# Patient Record
Sex: FEMALE | URBAN - METROPOLITAN AREA
[De-identification: names, ages, dates, MRNs, and addresses within clinical notes are randomized per-mention and may not be internally consistent; named-entity substitution may affect disease eponyms.]

---

## 2018-12-18 ENCOUNTER — APPOINTMENT (RX ONLY)
Dept: URBAN - METROPOLITAN AREA CLINIC 98 | Facility: CLINIC | Age: 33
Setting detail: DERMATOLOGY
End: 2018-12-18

## 2018-12-18 DIAGNOSIS — L91.0 HYPERTROPHIC SCAR: ICD-10-CM

## 2018-12-18 PROCEDURE — ? TREATMENT REGIMEN

## 2018-12-18 PROCEDURE — 11900 INJECT SKIN LESIONS </W 7: CPT

## 2018-12-18 PROCEDURE — ? INTRALESIONAL KENALOG

## 2018-12-18 ASSESSMENT — LOCATION ZONE DERM: LOCATION ZONE: TRUNK

## 2018-12-18 ASSESSMENT — LOCATION SIMPLE DESCRIPTION DERM
LOCATION SIMPLE: ABDOMEN
LOCATION SIMPLE: CHEST

## 2018-12-18 ASSESSMENT — SCAR ASSESSEMENT OVERALL: SCAR ASSESSMENT: 2 (RAISED UNIFORM SCAR, NO ERYTHEMA)

## 2018-12-18 ASSESSMENT — LOCATION DETAILED DESCRIPTION DERM
LOCATION DETAILED: MIDDLE STERNUM
LOCATION DETAILED: PERIUMBILICAL SKIN

## 2018-12-18 NOTE — PROCEDURE: INTRALESIONAL KENALOG
Consent: The risks of atrophy were reviewed with the patient.
Concentration Of Solution Injected (Mg/Ml): 20.0
Detail Level: Detailed
Administered By (Optional): Dr. Lopez
Include Z78.9 (Other Specified Conditions Influencing Health Status) As An Associated Diagnosis?: No
X Size Of Lesion In Cm (Optional): 0
Total Volume Injected (Ccs- Only Use Numbers And Decimals): 0.5
Medical Necessity Clause: This procedure was medically necessary because the lesions that were treated were:
Kenalog Preparation: Kenalog

## 2018-12-18 NOTE — HPI: SKIN LESION
Is This A New Presentation, Or A Follow-Up?: Skin Lesion
What Type Of Note Output Would You Prefer (Optional)?: Bullet Format
How Severe Is Your Skin Lesion?: moderate
Has Your Skin Lesion Been Treated?: been treated
Additional History: Pt got injection every 2 months x 1 year of k 40 which flattened the keloid . Pt reports her last injection was 6 mos ago .
Is This A New Presentation, Or A Follow-Up?: Growth
Has Your Skin Lesion Been Treated?: not been treated
Additional History: Pt c/o keloid .

## 2019-07-18 ENCOUNTER — LAB VISIT (OUTPATIENT)
Dept: LAB | Facility: OTHER | Age: 34
End: 2019-07-18
Attending: OBSTETRICS & GYNECOLOGY
Payer: MEDICAID

## 2019-07-18 ENCOUNTER — OFFICE VISIT (OUTPATIENT)
Dept: OBSTETRICS AND GYNECOLOGY | Facility: CLINIC | Age: 34
End: 2019-07-18
Payer: MEDICAID

## 2019-07-18 VITALS
SYSTOLIC BLOOD PRESSURE: 94 MMHG | BODY MASS INDEX: 26.94 KG/M2 | WEIGHT: 146.38 LBS | HEIGHT: 62 IN | DIASTOLIC BLOOD PRESSURE: 60 MMHG

## 2019-07-18 DIAGNOSIS — R10.2 PELVIC PAIN: Primary | ICD-10-CM

## 2019-07-18 DIAGNOSIS — N94.10 DYSPAREUNIA IN FEMALE: ICD-10-CM

## 2019-07-18 DIAGNOSIS — Z11.3 ROUTINE SCREENING FOR STI (SEXUALLY TRANSMITTED INFECTION): ICD-10-CM

## 2019-07-18 PROCEDURE — 99385 PREV VISIT NEW AGE 18-39: CPT | Mod: 25,S$PBB,, | Performed by: OBSTETRICS & GYNECOLOGY

## 2019-07-18 PROCEDURE — 87801 DETECT AGNT MULT DNA AMPLI: CPT

## 2019-07-18 PROCEDURE — 87491 CHLMYD TRACH DNA AMP PROBE: CPT | Mod: 59

## 2019-07-18 PROCEDURE — 99203 OFFICE O/P NEW LOW 30 MIN: CPT | Mod: PBBFAC | Performed by: OBSTETRICS & GYNECOLOGY

## 2019-07-18 PROCEDURE — 87340 HEPATITIS B SURFACE AG IA: CPT

## 2019-07-18 PROCEDURE — 36415 COLL VENOUS BLD VENIPUNCTURE: CPT

## 2019-07-18 PROCEDURE — 99999 PR PBB SHADOW E&M-NEW PATIENT-LVL III: ICD-10-PCS | Mod: PBBFAC,,, | Performed by: OBSTETRICS & GYNECOLOGY

## 2019-07-18 PROCEDURE — 86803 HEPATITIS C AB TEST: CPT

## 2019-07-18 PROCEDURE — 99385 PR PREVENTIVE VISIT,NEW,18-39: ICD-10-PCS | Mod: 25,S$PBB,, | Performed by: OBSTETRICS & GYNECOLOGY

## 2019-07-18 PROCEDURE — 86592 SYPHILIS TEST NON-TREP QUAL: CPT

## 2019-07-18 PROCEDURE — 99999 PR PBB SHADOW E&M-NEW PATIENT-LVL III: CPT | Mod: PBBFAC,,, | Performed by: OBSTETRICS & GYNECOLOGY

## 2019-07-18 PROCEDURE — 87481 CANDIDA DNA AMP PROBE: CPT | Mod: 59

## 2019-07-18 PROCEDURE — 86703 HIV-1/HIV-2 1 RESULT ANTBDY: CPT

## 2019-07-18 NOTE — PROGRESS NOTES
"CC: 32 yo  female, here for AE and problem visit    HPI: Carmina is overall well today.  She is a  s/p .  Moved from [place]. Last pap was 1.5 years ago. Painful intercourse for last 2 months. Partner may have some infidelity. No vaginal discharge. Regular cycles, painful for first 2 days. Tried birth control before and no improvement in her cycles. Also has some heavy bleeding. Partner has had a vasectomy. She has a 7 yo child. Pain with insertion with intercourse. No current stressors other than being a mom. She works out 2 hours per day. Not working currently. No history of sexual trauma.      History reviewed. No pertinent past medical history.    History reviewed. No pertinent surgical history.    OB History        1    Para   1    Term   1            AB        Living   1       SAB        TAB        Ectopic        Multiple        Live Births   1                 No current outpatient medications on file prior to visit.     No current facility-administered medications on file prior to visit.        ROS:  GENERAL: Denies weight gain or weight loss. Feeling well overall.   SKIN: Denies rash or lesions.   HEAD: Denies head injury or headache.   CHEST: Denies chest pain or shortness of breath.   CARDIOVASCULAR: Denies palpitations or left sided chest pain.   ABDOMEN: No abdominal pain, constipation, diarrhea, nausea, vomiting or rectal bleeding.   URINARY: No frequency, dysuria, hematuria or burning on urination.  REPRODUCTIVE: See HPI.   HEMATOLOGIC: No easy bruisability or excessive bleeding.   MUSCULOSKELETAL: Denies joint pain or swelling.     Physical Exam:   BP 94/60   Ht 5' 2" (1.575 m)   Wt 66.4 kg (146 lb 6.2 oz)   LMP 2019 (Approximate)   BMI 26.77 kg/m²   General: No distress, well appearing  HEENT: normocephalic, atraumatic   Heart: Regular rate  Lungs: No increased work of breathing  Breasts: Symmetrical, no masses, discharge or skin retractions. There is keloid scar " on upper portion of chest (from prior removal of pimple). Fibrocystic changes present.   Abdomen: soft, nontender, no masses  MS: lower extremeties symmetrical, no edema  Pelvic Exam:     GENITALIA: Normal external genitalia, no lesions   URETHRA: normal appearing   Bladder non tender   VAGINA: normal vaginal mucosa, no lesions, she has significant pelvic floor tenderness on exam along posterior vaginal wall consistent with myofascial pelvic pain   CERVIX: no lesions visible, no CMT    UTERUS: small, mobile, non tender   ADNEXA: no adnexal masses, tenderness or fullness  PSYCH: Normal affect, mood appropriate     ASSESSMENT/PLAN: 34 yo  here for AE and problem visit.     1. Pap up to date, next due in   2. Vasectomy for contraception  3. Desires STI screening    Dyspareunia - suspect pelvic floor myalgia based on exam findings  1. GC/CT and affirm collected and sent  2. Referral to pelvic floor PT    Morena Monk MD  Obstetrics and Gynecology  [facility]

## 2019-07-19 LAB
BACTERIAL VAGINOSIS DNA: NEGATIVE
C TRACH DNA SPEC QL NAA+PROBE: NOT DETECTED
CANDIDA GLABRATA DNA: NEGATIVE
CANDIDA KRUSEI DNA: NEGATIVE
CANDIDA RRNA VAG QL PROBE: NEGATIVE
HBV SURFACE AG SERPL QL IA: NEGATIVE
HCV AB SERPL QL IA: NEGATIVE
HIV 1+2 AB+HIV1 P24 AG SERPL QL IA: NEGATIVE
N GONORRHOEA DNA SPEC QL NAA+PROBE: NOT DETECTED
RPR SER QL: NORMAL
T VAGINALIS RRNA GENITAL QL PROBE: NEGATIVE

## 2019-09-04 ENCOUNTER — CLINICAL SUPPORT (OUTPATIENT)
Dept: REHABILITATION | Facility: HOSPITAL | Age: 34
End: 2019-09-04
Attending: OBSTETRICS & GYNECOLOGY
Payer: MEDICAID

## 2019-09-04 DIAGNOSIS — M62.838 MUSCLE SPASM: ICD-10-CM

## 2019-09-04 PROCEDURE — 97161 PT EVAL LOW COMPLEX 20 MIN: CPT | Mod: PO

## 2019-09-04 PROCEDURE — 97110 THERAPEUTIC EXERCISES: CPT | Mod: PO

## 2019-09-04 NOTE — PATIENT INSTRUCTIONS
Home Exercise Program: 09/04/2019    PERINEAL MASSAGE    1. Wash hands thoroughly with antibacterial soap.  2. Lay down comfortably with your back and head well supported by several pillows.  3. Apply water-based lubricant (ie. Slippery stuff, coconut oil, olive oil) on your thumb and perineum.  4. Place one thumb to 1st knuckle just inside the vagina.  5. Gently press downward at 6 o' clock. Hold for 90 seconds. Perform Diaphragmatic breathing while holding the stretch. Repeat at 5 and 7 o' clock.  6. The amount of pressure for the stretch may cause discomfort, but not pain (ie. You can replicate the stretching sensation by bending your finger backward). Don't go above 4-5/10 pain during or after the exericse.  7. Focus on relaxed breathing and keeping the pelvic floor muscles dropped.   8. Continue for 5-10 minutes, 3-4 times per week.    STOP if there is increased bleeding, an infection, or extreme pain.       Some may prefer their partner to assist them or to perform the massage for them. Your partner needs to use clean hands and gently insert one or two index fingers inside the lower part of the vagina and follow the steps listed above. It is important to tell your partner how much pressure to apply without causing pain.       Home Exercise Program: 09/04/2019    DIAPHRAGMATIC BREATHING     The diaphragm is a dome shaped muscle that forms the floor of the rib cage. It is the most efficient muscle for breathing and relaxation, although most people are not used to using the diaphragm. Diaphragmatic or belly breathing is an important technique to learn because it helps settle down or relax the autonomic nervous system. The correct use of diaphragmatic breathing can help to quiet brain activity resulting in the relaxation of all the muscles and organs of the body. This is accomplished by slow rhythmic breathing concentrated in the diaphragm muscle rather than the chest.    How to do proper relaxation  breathing:     Start by lying on your back or reclining in a chair in a relaxed position. Place one hand on your chest and the other on your abdomen.   Relax your jaw by placing your tongue on the roof of your mouth and keeping your teeth slightly apart.    Take a deep breath in, letting the abdomen expand and rise while you keep your upper chest, neck and shoulders relaxed.    As you breathe out, allow your abdomen and chest to fall. Exhale completely.   It doesn't matter if you breathe in/out through your nose and/or mouth. Do whichever feels comfortable.   Remember to breathe slowly.  Do not force your breathing. Do not hold your breath.   Repeat for 10 minutes every day.

## 2019-09-04 NOTE — PLAN OF CARE
[facility]  Physical Therapy Initial Evaluation    Name: Carmina Rhodes  Clinic Number: [id number]    Therapy Diagnosis:   Encounter Diagnosis   Name Primary?    Muscle spasm      Physician: Morena Monk MD      Physician Orders: PT Eval and Treat   Medical Diagnosis: dyspareunia  Evaluation Date: 9/4/2019  Authorization Period Expiration: 12/31/19  Plan of Care Certification Period: 12/4/19  Visit #/Visits authorized: 1/ 20     Today's Date: 9/4/2019    Time In: 1200 PM  Time Out: 100 PM  Total Billable Time: 60 minutes    Precautions: Standard    SUBJECTIVE     Date of onset: 3 months ago    History of current condition - Carmina reports: she randomly began having pain with intercourse out of the blue. She used to do doggie style but reports she has been unable to try. No position is comfortable. She now has to use water based lubricant at every encounter. She has pain at initial insertion and with thrusting. No other pain reported- only with intercourse. She is not on birth control. She does state that she was lifting heavy weights with a  earlier in the year and is unsure if this may have caused. She does report some increase in stress with her daughter. Last attempt at intercourse was Sunday. It was painful the whole time but able to perform. No pain with tampons. She did have pain with vaginal exam by MD.      History  No past medical history on file.    Carmina Rhodes  has no past surgical history on file.    Carmina currently has no medications in their medication list.    Review of patient's allergies indicates:   Allergen Reactions    Sulfa (sulfonamide antibiotics)           OB/GYN History: childbirth vaginal delivery and episiotomy    Bladder/Bowel History: constipation/straining for movement    Abuse History: denies    Reproductive Symptoms  Sexually active: Yes  Pain: No    Urinary Symptoms  Frequency of urination    Daytime: 4            Nighttime:  0  Difficulty initiating urine stream: Yes  Urine stream: strong  Complete emptying: Yes  Bladder leakage: No  Frequency of incidents: None  Amount leaked: None    Bowel Symptoms  Frequency of bowel movements: once every 2-3 days  Difficulty initiating BM: Yes  Quality/Shape of BM: [place] Stool Chart type(s) 2, 3, 4  Complete emptying: Yes  Colon leakage: No    Diet / Behavior  Form of protection: none   Number of pads required in 24 hours: 0  Fluid intake: water  Diet:Regular  Current exercise:Cardio (2 hours, 5 days per week)    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    PLOF: no pain with intercourse    Pain:  Current 0/10, worst 8/10, best 0/10 (with 0 being the lowest and 10 being the highest)  Location: vagina  Pelvic Pain Duration Occurs only during provocation  Pain description:Sharp  Aggravating Factors: interourse    Pts goals: pain-free intercourse      OBJECTIVE     Chaperone: denied  Consent signed: Yes    ORTHO SCREEN  Posture: WNL  Pelvic alignment: no sign of deviations noted in supine  Pubic symphysis: WNL    ABDOMINALS  Scarring: keloid from belly piercing  Diastasis: 1 fingers above umbilicus, 1 fingers at umbilicus, 1 fingers below umbilicus   Abdominal strength: Rectus: 4/5     TA: strong      VAGINAL PELVIC FLOOR EXAM    EXTERNAL ASSESSMENT  Introitus: WNL  Skin condition: WNL  Scarring: none   Sensation: WNL   Pain: none  Pelvic Clock Assessment: + TTP bulbocavernosus and STP B  Voluntary contraction: present  Voluntary relaxation: present but partial  Involuntary contraction: present  Bearing down: present  Perineal descent: absent      INTERNAL ASSESSMENT  Pain: trigger points as follows: B OIs and pain once again at B STP with introital stretch   Sensation: able to localized pressure appropriately   Vaginal vault: WNL   Muscle Bulk: hypertonic  Muscle Power: 4/5  Muscle Endurance: NT  # Reps To Fatigue: NT    Fast Contractions: NT   Involuntary Contraction: contraction  Bearing Down: bulge      Quality of contraction: WNL   Specificity: WNL   Coordination: WNL   Prolapse check: DNA    Not performed         TREATMENT     Treatment Time In: 1245 PM  Treatment Time Out: 100 PM  Total Treatment time separate from Evaluation: 15 minutes    Carmina participated in therapeutic exercises to develop ROM for 15 minutes including:  Instruction in introital stretching  Instruction of performing Diaphrgmatic breathing      EDUCATION  Education provided:   - Instructed on general anatomy/physiology  - Role of therapy in multi-disciplinary team  - Instructed in purpose of physical therapy and the benefits/risks of treatment  - Risks of refusing treatment  - POC and goals for therapy  No spiritual or educational barriers to learning provided    Written Home Exercises Provided:   Pt was provided with a written copy of exercises to perform at home. Carmina demonstrated good  understanding of the education provided.     See EMR under Patient Instructions for exercises provided 9/4/2019.    ASSESSMENT      Carmina is a 33 y.o. female referred to outpatient Physical Therapy with a medical diagnosis of dysparuenia. Pt presents with increased tension of the pelvic floor with trigger points and pain noted in B OIs and superficial transverse perineals. This is leading to pain with intercourse and pain with vaginal exams.    Pt prognosis is Good.   Pt will benefit from skilled outpatient Physical Therapy to address the deficits stated above and in the chart below, provide pt/family education, and to maximize pt's level of independence.     Plan of care discussed with patient: Yes  Pt's spiritual, cultural and educational needs considered and patient is agreeable to the plan of care and goals as stated below:     Anticipated Barriers for therapy: none    Medical Necessity is demonstrated by the following  History  Co-morbidities and personal factors that may impact the plan of care Co-morbidities:   none    Personal Factors:    none     low   Examination  Body Structures and Functions, activity limitations and participation restrictions that may impact the plan of care Body Regions/Systems/Functions:  pelvic floor tenderness and increased tension of the pelvic muscles     Activity Limitations:  intercourse/vaginal exam/tampon use without pain     Participation Restrictions:  relationship with spouse/partner and well woman's exam          high   Clinical Presentation stable and uncomplicated low   Decision Making/ Complexity Score: low       GOALS    Short Term Goals: 4 weeks   Pt will verbalize improved awareness of PFM activity as palpated by PT in order to improve activity involvement with HEP.  Pt will be independent with diaphragmatic breathing and relaxation techniques in order to improve ability to relax/drop pelvic floor muscles and for down regulation of the CNS.  Pt will report minimal to no pain with single digit pelvic assessment and display relaxation during this assessment in order to progress manual therapy tolerance and home wand/dilator use.       Long Term Goals: 12 weeks     Pt will report improvement in relaxation ability with intimacy (whether this includes external touching only or internal touching) in order to progress towards goals.  Pt will be independent with HEP and self management techniques.  Pt will be able to achieve penetrative intercourse with discomfort at 3/10 or less throughout experience in order to improve activity tolerance and her sexual relationship with her significant other.  Pt to report being able to have a comfortable vaginal exam without significant increase in pelvic pain.      PLAN      Certification Period: 9/4/2019 to 12/4/19    Outpatient Physical Therapy 1 time(s) every week(s) for 12 weeks to include the following interventions: patient education, HEP, therapeutic exercises, neuromuscular re-education, therapeutic activity, manual therapy, and modalities PRN to achieve established  goals.     Ashley Holstein, LICHA       I have reviewed and concur with the resident's history, physical assessment and plan.      Morena Monk MD  Obstetrics and Gynecology  [facility]

## 2019-09-06 ENCOUNTER — TELEPHONE (OUTPATIENT)
Dept: OBSTETRICS AND GYNECOLOGY | Facility: CLINIC | Age: 34
End: 2019-09-06

## 2019-09-06 NOTE — TELEPHONE ENCOUNTER
----- Message from Liudmila Marion sent at 9/6/2019  2:03 PM CDT -----  Contact: Carmina Hernandez  No. [phone] Patient called to schedule an appointment with a PCP. She would like Dr. Monk to refer her to a PCP who accepts Medicaid.

## 2019-09-06 NOTE — TELEPHONE ENCOUNTER
Lm for patient regarding an input for PCP will be placed, however we are not sure who accepts her insurance.

## 2019-09-09 DIAGNOSIS — Z01.419 WELL WOMAN EXAM: Primary | ICD-10-CM

## 2019-09-23 NOTE — PROGRESS NOTES
"  Physical Therapy Daily Treatment Note     Name: Carmina Hernandez  Clinic Number: [id number]    Therapy Diagnosis:   Encounter Diagnosis   Name Primary?    Muscle spasm      Physician: Morena Monk MD    Visit Date: 9/24/2019    Physician Orders: PT Eval and Treat   Medical Diagnosis: dyspareunia  Evaluation Date: 9/4/2019  Authorization Period Expiration: 12/31/19  Plan of Care Certification Period: 12/4/19  Visit #/Visits authorized: 2/ 20     Time In: 105 PM  Time Out: 200 PM  Total Billable Time: 55 minutes    Precautions: Standard    Subjective     Pt reports: attempted intercourse last night and it was uncomfortable. Has been doing the perineal with her partner, and it has been painful. Pain at 5 or 7 oclock is pretty incomfortable.  She was compliant with home exercise program.  Response to previous treatment: no change  Functional change: no change    Pain: 10/10 internally  Location: R sided post coital for an hour    Objective     Carmina participated in therapeutic exercises to develop ROM for 15 minutes including:  Happy baby 3 x 30s  Wall adductor stretch 3 x 30s  Piriformis stretch 3 x 30s  Diaphragmatic reedu    Patient participated in dynamic functional therapeutic activities to improve functional performance for 10 minutes. Including:   "pelvic floor check ins"  Self/partner touch to perineum with breathing for tissue relaxation  Association of positive experiences instead of painful experiences    Carmina received the following manual therapy techniques: Myofacial release and Soft tissue Mobilization were applied for pelvic floor for 30 minutes, including:  STM abdominals and adductors  Introital stretching 5, 6, 7 oclock  Release to B OIs and levator arthur  SCS B OIs      Home Exercises Provided and Patient Education Provided     Education provided:   - PT plan of care    Written Home Exercises Provided: yes.  Exercises were reviewed and Carmina was able to demonstrate them prior to the end " of the session.  Carmina demonstrated good  understanding of the education provided.     See EMR under Patient Instructions for exercises provided 9/24/2019.    Assessment     Good tolerance to tx session today. Pt very tender in B OIs; however, pain did decrease following releases to area. Pt encouraged to perform diaphragmatic breathing throughout session to help with CNS down training and tissue relaxation. Pt issued pelvic floor stretches as part of her HEP, and she was able to demo independence with performance.   Carmina is progressing well towards her goals.   Pt prognosis is Good.     Pt will continue to benefit from skilled outpatient physical therapy to address the deficits listed in the problem list box on initial evaluation, provide pt/family education and to maximize pt's level of independence in the home and community environment.     Pt's spiritual, cultural and educational needs considered and pt agreeable to plan of care and goals.     Anticipated barriers to physical therapy: none    GOALS     Short Term Goals: 4 weeks   Pt will verbalize improved awareness of PFM activity as palpated by PT in order to improve activity involvement with HEP.  Pt will be independent with diaphragmatic breathing and relaxation techniques in order to improve ability to relax/drop pelvic floor muscles and for down regulation of the CNS.  Pt will report minimal to no pain with single digit pelvic assessment and display relaxation during this assessment in order to progress manual therapy tolerance and home wand/dilator use.        Long Term Goals: 12 weeks     Pt will report improvement in relaxation ability with intimacy (whether this includes external touching only or internal touching) in order to progress towards goals.  Pt will be independent with HEP and self management techniques.  Pt will be able to achieve penetrative intercourse with discomfort at 3/10 or less throughout experience in order to improve  activity tolerance and her sexual relationship with her significant other.  Pt to report being able to have a comfortable vaginal exam without significant increase in pelvic pain.    Plan     Body scan with MHP to start session, pain science, continue manual work    Ashley Holstein, PT

## 2019-09-24 ENCOUNTER — CLINICAL SUPPORT (OUTPATIENT)
Dept: REHABILITATION | Facility: HOSPITAL | Age: 34
End: 2019-09-24
Attending: OBSTETRICS & GYNECOLOGY
Payer: MEDICAID

## 2019-09-24 DIAGNOSIS — M62.838 MUSCLE SPASM: ICD-10-CM

## 2019-09-24 PROCEDURE — 97140 MANUAL THERAPY 1/> REGIONS: CPT | Mod: PO

## 2019-09-24 PROCEDURE — 97110 THERAPEUTIC EXERCISES: CPT | Mod: PO

## 2019-09-24 PROCEDURE — 97530 THERAPEUTIC ACTIVITIES: CPT | Mod: PO

## 2019-10-02 ENCOUNTER — CLINICAL SUPPORT (OUTPATIENT)
Dept: REHABILITATION | Facility: HOSPITAL | Age: 34
End: 2019-10-02
Attending: OBSTETRICS & GYNECOLOGY
Payer: MEDICAID

## 2019-10-02 DIAGNOSIS — M62.838 MUSCLE SPASM: ICD-10-CM

## 2019-10-02 PROCEDURE — 97110 THERAPEUTIC EXERCISES: CPT | Mod: PO

## 2019-10-02 NOTE — PROGRESS NOTES
"  Physical Therapy Daily Treatment Note     Name: Carmina Hernandez  Clinic Number: [id number]    Therapy Diagnosis:   Encounter Diagnosis   Name Primary?    Muscle spasm      Physician: Morena Monk MD    Visit Date: 10/2/2019    Physician Orders: PT Eval and Treat   Medical Diagnosis: dyspareunia  Evaluation Date: 9/4/2019  Authorization Period Expiration: 12/31/19  Plan of Care Certification Period: 12/4/19  Visit #/Visits authorized: 3/ 20     Time In: 1203 PM  Time Out: 100 PM  Total Billable Time: 57 minutes    Precautions: Standard    Subjective     Pt reports: has been noticing that she is holding stress in her abdominal wall. She was consistent and able to do her stretching. She did attempt intercourse after other attempts at pleasure first; however, it did not help. She had pain during and after for many hours.  She was compliant with home exercise program.  Response to previous treatment: no change  Functional change: no change    Pain: 9/10 internally  Location: R sided post coital for an hour    Objective     Carmina participated in therapeutic exercises to develop ROM for 10 minutes including:  Happy baby 3 x 30s-NP  Wall adductor stretch 3 x 30s-NP  Piriformis stretch 3 x 30s-NP  MHP to abdomen with Diaphragmatic breathing and body scan meditation    Patient participated in dynamic functional therapeutic activities to improve functional performance for 10 minutes. Including:   "pelvic floor check ins"  Pain science education  Therawand purchase discussion    Carmina received the following manual therapy techniques: Myofacial release and Soft tissue Mobilization were applied for pelvic floor for 35 minutes, including:  STM abdominals and adductors  Introital stretching 5, 6, 7 oclock  Release to B OIs and levator arthur  SCS B OIs      Home Exercises Provided and Patient Education Provided     Education provided:   - PT plan of care    Written Home Exercises Provided: yes.  Exercises were reviewed " and Carmina was able to demonstrate them prior to the end of the session.  Carmina demonstrated good  understanding of the education provided.     See EMR under Patient Instructions for exercises provided 9/24/2019.    Assessment     Pt responded well to CNS down training with diaphragmatic breathing and meditation at the beginning of session. Pelvic floor tenderness/pain remains a significant issue especially on the R side 3rd layer mm as compared to the L. Discussed ordering a therawand for home performance of tissue release, and patient was agreeable. She will order and bring it in to next session for training.  Carmina is progressing well towards her goals.   Pt prognosis is Good.     Pt will continue to benefit from skilled outpatient physical therapy to address the deficits listed in the problem list box on initial evaluation, provide pt/family education and to maximize pt's level of independence in the home and community environment.     Pt's spiritual, cultural and educational needs considered and pt agreeable to plan of care and goals.     Anticipated barriers to physical therapy: none    GOALS     Short Term Goals: 4 weeks   Pt will verbalize improved awareness of PFM activity as palpated by PT in order to improve activity involvement with HEP.  Pt will be independent with diaphragmatic breathing and relaxation techniques in order to improve ability to relax/drop pelvic floor muscles and for down regulation of the CNS.  Pt will report minimal to no pain with single digit pelvic assessment and display relaxation during this assessment in order to progress manual therapy tolerance and home wand/dilator use.        Long Term Goals: 12 weeks     Pt will report improvement in relaxation ability with intimacy (whether this includes external touching only or internal touching) in order to progress towards goals.  Pt will be independent with HEP and self management techniques.  Pt will be able to achieve  penetrative intercourse with discomfort at 3/10 or less throughout experience in order to improve activity tolerance and her sexual relationship with her significant other.  Pt to report being able to have a comfortable vaginal exam without significant increase in pelvic pain.    Plan     Body scan with P to start session, pain science, therawand instruction    Ashley Holstein, PT

## 2019-10-02 NOTE — PATIENT INSTRUCTIONS
Home Exercise Program: 10/02/2019    [employer] PURCH[employer]  [email or web address]  > Supplies     > Pelvic Floor Rehab        > The Original Essential Stephanie Youssef (item #[id number]) $35.49    Call to place order, [phone].

## 2019-10-09 ENCOUNTER — CLINICAL SUPPORT (OUTPATIENT)
Dept: REHABILITATION | Facility: HOSPITAL | Age: 34
End: 2019-10-09
Attending: OBSTETRICS & GYNECOLOGY
Payer: MEDICAID

## 2019-10-09 DIAGNOSIS — M62.838 MUSCLE SPASM: ICD-10-CM

## 2019-10-09 PROCEDURE — 97110 THERAPEUTIC EXERCISES: CPT | Mod: PO

## 2019-10-09 NOTE — PROGRESS NOTES
"  Physical Therapy Daily Treatment Note     Name: Carmina Hernandez  Clinic Number: [id number]    Therapy Diagnosis:   Encounter Diagnosis   Name Primary?    Muscle spasm      Physician: Morena Monk MD    Visit Date: 10/9/2019    Physician Orders: PT Eval and Treat   Medical Diagnosis: dyspareunia  Evaluation Date: 9/4/2019  Authorization Period Expiration: 12/31/19  Plan of Care Certification Period: 12/4/19  Visit #/Visits authorized: 4/ 20     Time In: 1200 PM  Time Out: 1255 PM  Total Billable Time: 55 minutes    Precautions: Standard    Subjective     Pt reports: she was sore after her last session, but only for a couple of hours. She did attempt intercourse two times, but she reports she was not too into it. When he did not penetrate as deep, it felt better.  She was compliant with home exercise program.  Response to previous treatment: no change  Functional change: no change    Pain: 9/10 internally  Location: R sided post coital for an hour    Objective     Carmina participated in therapeutic exercises to develop ROM for 10 minutes including:  Happy baby 3 x 30s  Wall adductor stretch 3 x 30s  Piriformis stretch 3 x 30s  MHP to abdomen with Diaphragmatic breathing and body scan meditation-NP    Patient participated in dynamic functional therapeutic activities to improve functional performance for 10 minutes. Including:   "pelvic floor check ins"  Remphasized therawand purchase discussion  Discussed holding off on deeper penetration until pain/symptoms decrease    Carmina received the following manual therapy techniques: Myofacial release and Soft tissue Mobilization were applied for pelvic floor for 35 minutes, including:  STM abdominals and adductors  Introital stretching 5, 6, 7 oclock  Release to L OIs and levator ani    Home Exercises Provided and Patient Education Provided     Education provided:   - PT plan of care    Written Home Exercises Provided: yes.  Exercises were reviewed and Carmina was " able to demonstrate them prior to the end of the session.  Cramina demonstrated good  understanding of the education provided.     See EMR under Patient Instructions for exercises provided 9/24/2019.    Assessment     Improvements in pelvic floor tension noted today as pt did not experience any tenderness or symptoms with palpation to L sided musculature. The levator ani on the R side remain significantly tender; however, they are able to relax some with ischemic pressure and diaphragmatic breathing. Pt once again encouraged to purchase therawand for self release to PFM to improve carryover between sessions. Also, discussed avoiding deep penetration until symptoms subside to avoid CNS upregulation.  Carmina is progressing well towards her goals.   Pt prognosis is Good.     Pt will continue to benefit from skilled outpatient physical therapy to address the deficits listed in the problem list box on initial evaluation, provide pt/family education and to maximize pt's level of independence in the home and community environment.     Pt's spiritual, cultural and educational needs considered and pt agreeable to plan of care and goals.     Anticipated barriers to physical therapy: none    GOALS     Short Term Goals: 4 weeks   Pt will verbalize improved awareness of PFM activity as palpated by PT in order to improve activity involvement with HEP.  Pt will be independent with diaphragmatic breathing and relaxation techniques in order to improve ability to relax/drop pelvic floor muscles and for down regulation of the CNS.  Pt will report minimal to no pain with single digit pelvic assessment and display relaxation during this assessment in order to progress manual therapy tolerance and home wand/dilator use.        Long Term Goals: 12 weeks     Pt will report improvement in relaxation ability with intimacy (whether this includes external touching only or internal touching) in order to progress towards goals.  Pt will be  independent with HEP and self management techniques.  Pt will be able to achieve penetrative intercourse with discomfort at 3/10 or less throughout experience in order to improve activity tolerance and her sexual relationship with her significant other.  Pt to report being able to have a comfortable vaginal exam without significant increase in pelvic pain.    Plan     Body scan with MHP to start session, pain science, therawand instruction    Ashley Holstein, LICHA

## 2019-10-30 ENCOUNTER — LAB VISIT (OUTPATIENT)
Dept: LAB | Facility: OTHER | Age: 34
End: 2019-10-30
Payer: MEDICAID

## 2019-10-30 ENCOUNTER — OFFICE VISIT (OUTPATIENT)
Dept: OBSTETRICS AND GYNECOLOGY | Facility: CLINIC | Age: 34
End: 2019-10-30
Payer: MEDICAID

## 2019-10-30 VITALS
WEIGHT: 149.94 LBS | BODY MASS INDEX: 27.59 KG/M2 | HEIGHT: 62 IN | SYSTOLIC BLOOD PRESSURE: 118 MMHG | DIASTOLIC BLOOD PRESSURE: 70 MMHG

## 2019-10-30 DIAGNOSIS — Z11.3 SCREENING EXAMINATION FOR STD (SEXUALLY TRANSMITTED DISEASE): Primary | ICD-10-CM

## 2019-10-30 DIAGNOSIS — Z11.3 SCREENING EXAMINATION FOR STD (SEXUALLY TRANSMITTED DISEASE): ICD-10-CM

## 2019-10-30 LAB
CANDIDA RRNA VAG QL PROBE: NEGATIVE
G VAGINALIS RRNA GENITAL QL PROBE: NEGATIVE
T VAGINALIS RRNA GENITAL QL PROBE: NEGATIVE

## 2019-10-30 PROCEDURE — 99213 PR OFFICE/OUTPT VISIT, EST, LEVL III, 20-29 MIN: ICD-10-PCS | Mod: S$PBB,,, | Performed by: NURSE PRACTITIONER

## 2019-10-30 PROCEDURE — 99999 PR PBB SHADOW E&M-EST. PATIENT-LVL III: CPT | Mod: PBBFAC,,, | Performed by: NURSE PRACTITIONER

## 2019-10-30 PROCEDURE — 36415 COLL VENOUS BLD VENIPUNCTURE: CPT

## 2019-10-30 PROCEDURE — 99999 PR PBB SHADOW E&M-EST. PATIENT-LVL III: ICD-10-PCS | Mod: PBBFAC,,, | Performed by: NURSE PRACTITIONER

## 2019-10-30 PROCEDURE — 86703 HIV-1/HIV-2 1 RESULT ANTBDY: CPT

## 2019-10-30 PROCEDURE — 87661 TRICHOMONAS VAGINALIS AMPLIF: CPT

## 2019-10-30 PROCEDURE — 99213 OFFICE O/P EST LOW 20 MIN: CPT | Mod: S$PBB,,, | Performed by: NURSE PRACTITIONER

## 2019-10-30 PROCEDURE — 86592 SYPHILIS TEST NON-TREP QUAL: CPT

## 2019-10-30 PROCEDURE — 99213 OFFICE O/P EST LOW 20 MIN: CPT | Mod: PBBFAC | Performed by: NURSE PRACTITIONER

## 2019-10-30 PROCEDURE — 87491 CHLMYD TRACH DNA AMP PROBE: CPT

## 2019-10-30 PROCEDURE — 80074 ACUTE HEPATITIS PANEL: CPT

## 2019-10-30 NOTE — PROGRESS NOTES
CC: Screening for sexually transmitted infection    Carmina Hernandez is a 33 y.o. female  presents for STD screening  Patient's last menstrual period was 10/09/2019..  Denies any new rashes or lesions.  Denies pelvic or abdominal pain.  Denies vulvovaginal itching or irritation.  Denies abnormal or foul vaginal discharge. She reports mild improvement of pelvic pain since beginning physical therapy.        ROS:  GENERAL: Denies weight gain or weight loss. Feeling well overall.   SKIN: Denies rash or lesions.   HEAD: Denies head injury or headache.   NODES: Denies enlarged lymph nodes.   CHEST: Denies chest pain or shortness of breath.   CARDIOVASCULAR: Denies palpitations or left sided chest pain.   ABDOMEN: No abdominal pain, constipation, diarrhea, nausea, vomiting or rectal bleeding.   URINARY: No frequency, dysuria, hematuria, or burning on urination.  REPRODUCTIVE: See HPI.   BREASTS: The patient performs breast self-examination and denies pain, lumps, or nipple discharge.   HEMATOLOGIC: No easy bruisability or excessive bleeding.   MUSCULOSKELETAL: Denies joint pain or swelling.   NEUROLOGIC: Denies syncope or weakness.   PSYCHIATRIC: Denies depression, anxiety or mood swings.      PHYSICAL EXAM:    APPEARANCE: Well nourished, well developed, in no acute distress.  AFFECT: Alert and oriented x 3  SKIN: Warm, dry, & intact. No acne or hirsutism.  NECK: Neck symmetric  NODES: No inguinal or femoral lymph node enlargement  CHEST:  Easy, even breaths.  ABDOMEN: Soft.  Nontender, nondistended.  PELVIC: Normal external genitalia without lesions.  Normal hair distribution.  Adequate perineal body, normal urethral meatus.    Vagina moist and well rugated without lesions or discharge.  Cervix pink, without lesions, discharge or tenderness.  No significant cystocele or rectocele.    Bimanual exam shows uterus to be normal size, regular, mobile and nontender.  Adnexa without masses or tenderness.    EXTREMITIES: No  edema.    Screening examination for STD (sexually transmitted disease)  -     C. trachomatis/N. gonorrhoeae by AMP DNA [facility]; Cervicovaginal  -     Vaginosis Screen by DNA Probe  -     HIV 1/2 Ag/Ab (4th Gen); Future; Expected date: 10/30/2019  -     RPR; Future; Expected date: 10/30/2019  -     Hepatitis panel, acute; Future; Expected date: 10/30/2019    GCCT/Affirm  STD Blood Panel      Patient was counseled today on prevention of STDs with use of condoms.  We also reviewed A.C.S. Pap guidelines and recommendations for yearly pelvic exams, mammograms and monthly self breast exams; to see her PCP for other health maintenance.     Followup pending lab results      TISH Reis

## 2019-10-31 LAB
C TRACH DNA SPEC QL NAA+PROBE: NOT DETECTED
HAV IGM SERPL QL IA: NEGATIVE
HBV CORE IGM SERPL QL IA: NEGATIVE
HBV SURFACE AG SERPL QL IA: NEGATIVE
HCV AB SERPL QL IA: NEGATIVE
HIV 1+2 AB+HIV1 P24 AG SERPL QL IA: NEGATIVE
N GONORRHOEA DNA SPEC QL NAA+PROBE: NOT DETECTED

## 2019-11-01 ENCOUNTER — PATIENT MESSAGE (OUTPATIENT)
Dept: OBSTETRICS AND GYNECOLOGY | Facility: CLINIC | Age: 34
End: 2019-11-01

## 2019-11-01 LAB — RPR SER QL: NORMAL

## 2019-11-06 ENCOUNTER — CLINICAL SUPPORT (OUTPATIENT)
Dept: REHABILITATION | Facility: HOSPITAL | Age: 34
End: 2019-11-06
Attending: OBSTETRICS & GYNECOLOGY
Payer: MEDICAID

## 2019-11-06 DIAGNOSIS — M62.838 MUSCLE SPASM: ICD-10-CM

## 2019-11-06 PROCEDURE — 97110 THERAPEUTIC EXERCISES: CPT | Mod: PO

## 2019-11-06 NOTE — PATIENT INSTRUCTIONS
Home Exercise Program: 11/06/2019    TRIGGER POINT RELEASE WITH DILATOR / THERAWAND  1. Make sure the wand is clean, free of any visible soiling.  2. Lay back comfortably with your back well supported by several pillows and both knees bent.   3. Apply water-based lubricant (ie. Slippery stuff, coconut oil, olive oil) on the end of the wand AND vaginal opening.  4. Grab the end of the wand (with the bumps on the end) with one hand, and spread your labia with the other hand to visualize the entrance of the vagina; insert the wand.   5. Gently palpate your pelvic floor muscles with the wand for trigger points/muscle spasms.     To find the pelvic floor muscles:   - In the middle at 6 o'clock = Rectum   - Move a little to the right at 5 o'clock = Right levator ani muscle    - Move a little to the left at 7 o'clock = Left levator ani muscle    - A little more to the right at 3/4 o'clock = Right obturator internus muscle   - A little more to the left at 8/9 o'clock = Left obturator internus muscle   - If you feel sharp, stabbing pain = bone or pudendal nerve (wrong)    6. When you find a sensitive, painful spot (a knot/bump in your muscle), apply constant pressure TO YOUR TOLERANCE.   *Do not apply so much pressure that you cannot tolerate it, your muscles start tightening up, or it leaves you too painful to do again the following day.    7. You may start to feel a pulse, throbbing, or light burning sensations; keep your pressure constant!  8. Hold this until the muscle spasm has diminished and the pain has improved. It may take several minutes.  9. Focus on Diaphragmatic Breathing and DROPPING your pelvic floor muscle (releasing the tension).    10. Once finished, remove wand.  12. Wash with anti-bacterial soap and thoroughly rinse. Let air dry whenever possible. Store in a dry, clean location.   13. Perform for 5-15 minutes, as needed. Check for muscle spasms every day.    *Don't apply so much pressure that it leaves  you too sore to perform again the next day.    STOP if there is increased bleeding, an infection, or extreme pain.

## 2019-11-06 NOTE — PROGRESS NOTES
Physical Therapy Daily Treatment Note     Name: Carmina Hernandez  Clinic Number: [id number]    Therapy Diagnosis:   Encounter Diagnosis   Name Primary?    Muscle spasm      Physician: Morena Monk MD    Visit Date: 11/6/2019    Physician Orders: PT Eval and Treat   Medical Diagnosis: dyspareunia  Evaluation Date: 9/4/2019  Authorization Period Expiration: 12/31/19  Plan of Care Certification Period: 12/4/19  Visit #/Visits authorized: 5/ 20     Time In:1105 AM  Time Out: 1200 PM  Total Billable Time: 55 minutes    Precautions: Standard    Subjective     Pt reports: attempted intercourse last night, it is very painful with deep penetration. Unsure how long pain lasted after because she fell asleep. Soreness lasts throughout the whole day following sessions.  She was compliant with home exercise program.  Response to previous treatment: no change  Functional change: no change    Pain: 9/10 internally, 0/10 current  Location: R sided post coital for an hour    Objective     Carmina participated in therapeutic exercises to develop ROM for 5 minutes including:  Happy baby 3 x 30s  Wall adductor stretch 3 x 30s  Piriformis stretch 3 x 30s  MHP to abdomen with Diaphragmatic breathing and body scan meditation-NP    Patient participated in dynamic functional therapeutic activities to improve functional performance for 25 minutes. Including:   Instructed in therawand use at home with hand over hand instruction and use of anatomical model    Carmina received the following manual therapy techniques: Myofacial release and Soft tissue Mobilization were applied for pelvic floor for 25 minutes, including:  STM abdominals and adductors  Introital stretching 5, 6, 7 oclock  Release to L OIs and levator ani    Home Exercises Provided and Patient Education Provided     Education provided:   - PT plan of care    Written Home Exercises Provided: yes.  Exercises were reviewed and Carmina was able to demonstrate them prior to the  end of the session.  Carmina demonstrated good  understanding of the education provided.     See EMR under Patient Instructions for exercises provided 9/24/2019.    Assessment     Pt remains with significant tenderness throughout the pelvic floor. Pt was instructed in home use of therawand in order to improve carryover between sessions. She was able to independently perform therawand following therapist hand over hand instruction and anatomical model demonstration. She was encouraged to continue er relaxation exercises as well.  Carmina is progressing well towards her goals.   Pt prognosis is Good.     Pt will continue to benefit from skilled outpatient physical therapy to address the deficits listed in the problem list box on initial evaluation, provide pt/family education and to maximize pt's level of independence in the home and community environment.     Pt's spiritual, cultural and educational needs considered and pt agreeable to plan of care and goals.     Anticipated barriers to physical therapy: none    GOALS     Short Term Goals: 4 weeks   Pt will verbalize improved awareness of PFM activity as palpated by PT in order to improve activity involvement with HEP.  Pt will be independent with diaphragmatic breathing and relaxation techniques in order to improve ability to relax/drop pelvic floor muscles and for down regulation of the CNS.  Pt will report minimal to no pain with single digit pelvic assessment and display relaxation during this assessment in order to progress manual therapy tolerance and home wand/dilator use.        Long Term Goals: 12 weeks     Pt will report improvement in relaxation ability with intimacy (whether this includes external touching only or internal touching) in order to progress towards goals.  Pt will be independent with HEP and self management techniques.  Pt will be able to achieve penetrative intercourse with discomfort at 3/10 or less throughout experience in order to  improve activity tolerance and her sexual relationship with her significant other.  Pt to report being able to have a comfortable vaginal exam without significant increase in pelvic pain.    Plan     Check in about betzaida, pain science    Ashley Holstein, PT

## 2019-11-13 ENCOUNTER — CLINICAL SUPPORT (OUTPATIENT)
Dept: REHABILITATION | Facility: HOSPITAL | Age: 34
End: 2019-11-13
Attending: OBSTETRICS & GYNECOLOGY
Payer: MEDICAID

## 2019-11-13 DIAGNOSIS — M62.838 MUSCLE SPASM: ICD-10-CM

## 2019-11-13 PROCEDURE — 97140 MANUAL THERAPY 1/> REGIONS: CPT | Mod: PO

## 2019-11-13 NOTE — PROGRESS NOTES
Physical Therapy Daily Treatment Note     Name: Carmina Hernandez  Clinic Number: [id number]    Therapy Diagnosis:   Encounter Diagnosis   Name Primary?    Muscle spasm      Physician: Morena Monk MD    Visit Date: 11/13/2019    Physician Orders: PT Eval and Treat   Medical Diagnosis: dyspareunia  Evaluation Date: 9/4/2019  Authorization Period Expiration: 12/31/19  Plan of Care Certification Period: 12/4/19  Visit #/Visits authorized: 6/ 20     Time In: 1105 AM  Time Out: 1200 PM  Total Billable Time: 55 minutes    Precautions: Standard    Subjective     Pt reports: she was pretty sore after last session. It lasted the rest of the day. She is still stretching both internally and externally. She used the wand two times since last session. She thinks she did it right. She found sore spots to the right. She finds she is not clenching as much as previously.  She was compliant with home exercise program.  Response to previous treatment: no change  Functional change: no change    Pain: 9/10 internally, 0/10 current  Location: R sided post coital for an hour    Objective     Carmina participated in therapeutic exercises to develop ROM for 00 minutes including:  Happy baby 3 x 30s  Wall adductor stretch 3 x 30s  Piriformis stretch 3 x 30s  MHP to abdomen with Diaphragmatic breathing and body scan meditation-NP    Patient participated in dynamic functional therapeutic activities to improve functional performance for 00 minutes. Including:   Instructed in therawand use at home with hand over hand instruction and use of anatomical model    Carmina received the following manual therapy techniques: Myofacial release and Soft tissue Mobilization were applied for pelvic floor for 55 minutes, including:  STM abdominals and adductors  Introital stretching 5, 6, 7 oclock  Release to B OIs and R levator ani  SCS B OIs  Focus on DB throughout manual therapy    Home Exercises Provided and Patient Education Provided      Education provided:   - PT plan of care    Written Home Exercises Provided: yes.  Exercises were reviewed and Carmina was able to demonstrate them prior to the end of the session.  Carmina demonstrated good  understanding of the education provided.     See EMR under Patient Instructions for exercises provided 9/24/2019.    Assessment     Good tolerance to tx session today. Pt encouraged to increase consistency with her therawand to improve carryover between sessions. SOme reduction in resting muscle tension noted as compared to previous tx session. R sided PF remains more tender as compared to L. Discussed possible use of dry needling to B OIs in order decreased pain and muscle tension.  Carmina is progressing well towards her goals.   Pt prognosis is Good.     Pt will continue to benefit from skilled outpatient physical therapy to address the deficits listed in the problem list box on initial evaluation, provide pt/family education and to maximize pt's level of independence in the home and community environment.     Pt's spiritual, cultural and educational needs considered and pt agreeable to plan of care and goals.     Anticipated barriers to physical therapy: none    GOALS     Short Term Goals: 4 weeks   Pt will verbalize improved awareness of PFM activity as palpated by PT in order to improve activity involvement with HEP.  Pt will be independent with diaphragmatic breathing and relaxation techniques in order to improve ability to relax/drop pelvic floor muscles and for down regulation of the CNS.  Pt will report minimal to no pain with single digit pelvic assessment and display relaxation during this assessment in order to progress manual therapy tolerance and home wand/dilator use.        Long Term Goals: 12 weeks     Pt will report improvement in relaxation ability with intimacy (whether this includes external touching only or internal touching) in order to progress towards goals.  Pt will be  independent with HEP and self management techniques.  Pt will be able to achieve penetrative intercourse with discomfort at 3/10 or less throughout experience in order to improve activity tolerance and her sexual relationship with her significant other.  Pt to report being able to have a comfortable vaginal exam without significant increase in pelvic pain.    Plan     Check in about therawand, pain science, possible dry needling to OI    Physical Therapy Daily Treatment Note     Name: Carmina Hernandez  Clinic Number: [id number]    Therapy Diagnosis:   Encounter Diagnosis   Name Primary?    Muscle spasm      Physician: Morena Monk MD    Visit Date: 11/13/2019    Physician Orders: PT Eval and Treat   Medical Diagnosis: dyspareunia  Evaluation Date: 9/4/2019  Authorization Period Expiration: 12/31/19  Plan of Care Certification Period: 12/4/19  Visit #/Visits authorized: 5/ 20     Time In:1105 AM  Time Out: 1200 PM  Total Billable Time: 55 minutes    Precautions: Standard    Subjective     Pt reports: attempted intercourse last night, it is very painful with deep penetration. Unsure how long pain lasted after because she fell asleep. Soreness lasts throughout the whole day following sessions.  She was compliant with home exercise program.  Response to previous treatment: no change  Functional change: no change    Pain: 9/10 internally, 0/10 current  Location: R sided post coital for an hour    Objective     Carmina participated in therapeutic exercises to develop ROM for 5 minutes including:  Happy baby 3 x 30s  Wall adductor stretch 3 x 30s  Piriformis stretch 3 x 30s  MHP to abdomen with Diaphragmatic breathing and body scan meditation-NP    Patient participated in dynamic functional therapeutic activities to improve functional performance for 25 minutes. Including:   Instructed in therawand use at home with hand over hand instruction and use of anatomical model    Carmina received the following manual therapy  techniques: Myofacial release and Soft tissue Mobilization were applied for pelvic floor for 25 minutes, including:  STM abdominals and adductors  Introital stretching 5, 6, 7 oclock  Release to L OIs and levator ani    Home Exercises Provided and Patient Education Provided     Education provided:   - PT plan of care    Written Home Exercises Provided: yes.  Exercises were reviewed and Carmina was able to demonstrate them prior to the end of the session.  Carmina demonstrated good  understanding of the education provided.     See EMR under Patient Instructions for exercises provided 9/24/2019.    Assessment     Pt remains with significant tenderness throughout the pelvic floor. Pt was instructed in home use of therawand in order to improve carryover between sessions. She was able to independently perform therawand following therapist hand over hand instruction and anatomical model demonstration. She was encouraged to continue er relaxation exercises as well.  Carmina is progressing well towards her goals.   Pt prognosis is Good.     Pt will continue to benefit from skilled outpatient physical therapy to address the deficits listed in the problem list box on initial evaluation, provide pt/family education and to maximize pt's level of independence in the home and community environment.     Pt's spiritual, cultural and educational needs considered and pt agreeable to plan of care and goals.     Anticipated barriers to physical therapy: none    GOALS     Short Term Goals: 4 weeks   Pt will verbalize improved awareness of PFM activity as palpated by PT in order to improve activity involvement with HEP.  Pt will be independent with diaphragmatic breathing and relaxation techniques in order to improve ability to relax/drop pelvic floor muscles and for down regulation of the CNS.  Pt will report minimal to no pain with single digit pelvic assessment and display relaxation during this assessment in order to progress  manual therapy tolerance and home wand/dilator use.        Long Term Goals: 12 weeks     Pt will report improvement in relaxation ability with intimacy (whether this includes external touching only or internal touching) in order to progress towards goals.  Pt will be independent with HEP and self management techniques.  Pt will be able to achieve penetrative intercourse with discomfort at 3/10 or less throughout experience in order to improve activity tolerance and her sexual relationship with her significant other.  Pt to report being able to have a comfortable vaginal exam without significant increase in pelvic pain.    Plan     Check in about betzaida, pain science    Ashley Holstein, PT     Ashley Holstein, PT

## 2019-12-06 ENCOUNTER — CLINICAL SUPPORT (OUTPATIENT)
Dept: REHABILITATION | Facility: HOSPITAL | Age: 34
End: 2019-12-06
Attending: OBSTETRICS & GYNECOLOGY
Payer: MEDICAID

## 2019-12-06 DIAGNOSIS — M62.838 MUSCLE SPASM: ICD-10-CM

## 2019-12-06 PROCEDURE — 97110 THERAPEUTIC EXERCISES: CPT | Mod: PO

## 2019-12-06 NOTE — PROGRESS NOTES
Physical Therapy Daily Treatment Note     Name: Carmina Hernandez  Clinic Number: [id number]    Therapy Diagnosis:   Encounter Diagnosis   Name Primary?    Muscle spasm      Physician: Morena Monk MD    Visit Date: 12/6/2019    Physician Orders: PT Eval and Treat   Medical Diagnosis: dyspareunia  Evaluation Date: 9/4/2019  Authorization Period Expiration: 12/31/19  Plan of Care Certification Period: 12/4/19  Visit #/Visits authorized: 7/ 20     Time In: 1115 AM  Time Out: 1200 PM  Total Billable Time: 45 minutes    Precautions: Standard    Subjective     Pt reports: she is no longer having pain with wanding. Has not attempted intercourse.  She was compliant with home exercise program.  Response to previous treatment: no change  Functional change: no change    Pain: 9/10 internally, 0/10 current  Location: R sided post coital for an hour    Objective     Carmina participated in therapeutic exercises to develop ROM for 00 minutes including:  Happy baby 3 x 30s  Wall adductor stretch 3 x 30s  Piriformis stretch 3 x 30s  MHP to abdomen with Diaphragmatic breathing and body scan meditation-NP    Patient participated in dynamic functional therapeutic activities to improve functional performance for 10 minutes. Including:   Instructed in therawand use to perform releases to B IOs    Carmina received the following manual therapy techniques: Myofacial release and Soft tissue Mobilization were applied for pelvic floor for 35 minutes, including:  STM abdominals and adductors  Introital stretching 5, 6, 7 oclock  Release to B OIs and R levator ani  SCS B OIs  Focus on DB throughout manual therapy    Home Exercises Provided and Patient Education Provided     Education provided:   - PT plan of care    Written Home Exercises Provided: yes.  Exercises were reviewed and Carmina was able to demonstrate them prior to the end of the session.  Carmina demonstrated good  understanding of the education provided.     See EMR  under Patient Instructions for exercises provided 9/24/2019.    Assessment     Overall improved muscle extensibility and decreased tenderness to palpation located throughout pelvic floor. She remains with tenderness in B OIs which reduce some with manual releases. Will attempt dry needling at next session. Pt instructed on specific techniques to reach the OIs with the wand.  Carmina is progressing well towards her goals.   Pt prognosis is Good.     Pt will continue to benefit from skilled outpatient physical therapy to address the deficits listed in the problem list box on initial evaluation, provide pt/family education and to maximize pt's level of independence in the home and community environment.     Pt's spiritual, cultural and educational needs considered and pt agreeable to plan of care and goals.     Anticipated barriers to physical therapy: none    GOALS     Short Term Goals: 4 weeks   Pt will verbalize improved awareness of PFM activity as palpated by PT in order to improve activity involvement with HEP.  Pt will be independent with diaphragmatic breathing and relaxation techniques in order to improve ability to relax/drop pelvic floor muscles and for down regulation of the CNS.  Pt will report minimal to no pain with single digit pelvic assessment and display relaxation during this assessment in order to progress manual therapy tolerance and home wand/dilator use.        Long Term Goals: 12 weeks     Pt will report improvement in relaxation ability with intimacy (whether this includes external touching only or internal touching) in order to progress towards goals.  Pt will be independent with HEP and self management techniques.  Pt will be able to achieve penetrative intercourse with discomfort at 3/10 or less throughout experience in order to improve activity tolerance and her sexual relationship with her significant other.  Pt to report being able to have a comfortable vaginal exam without significant  increase in pelvic pain.    Plan     Check in about therawand, pain science, possible dry needling to OI    Physical Therapy Daily Treatment Note     Name: Carmina Hernandez  Clinic Number: [id number]    Therapy Diagnosis:   Encounter Diagnosis   Name Primary?    Muscle spasm      Physician: Morena Monk MD    Visit Date: 12/6/2019    Physician Orders: PT Eval and Treat   Medical Diagnosis: dyspareunia  Evaluation Date: 9/4/2019  Authorization Period Expiration: 12/31/19  Plan of Care Certification Period: 12/4/19  Visit #/Visits authorized: 5/ 20     Time In:1105 AM  Time Out: 1200 PM  Total Billable Time: 55 minutes    Precautions: Standard    Subjective     Pt reports: attempted intercourse last night, it is very painful with deep penetration. Unsure how long pain lasted after because she fell asleep. Soreness lasts throughout the whole day following sessions.  She was compliant with home exercise program.  Response to previous treatment: no change  Functional change: no change    Pain: 9/10 internally, 0/10 current  Location: R sided post coital for an hour    Objective     Carmina participated in therapeutic exercises to develop ROM for 5 minutes including:  Happy baby 3 x 30s  Wall adductor stretch 3 x 30s  Piriformis stretch 3 x 30s  MHP to abdomen with Diaphragmatic breathing and body scan meditation-NP    Patient participated in dynamic functional therapeutic activities to improve functional performance for 25 minutes. Including:   Instructed in therawand use at home with hand over hand instruction and use of anatomical model    Carmina received the following manual therapy techniques: Myofacial release and Soft tissue Mobilization were applied for pelvic floor for 25 minutes, including:  STM abdominals and adductors  Introital stretching 5, 6, 7 oclock  Release to L OIs and levator ani    Home Exercises Provided and Patient Education Provided     Education provided:   - PT plan of care    Written Home  Exercises Provided: yes.  Exercises were reviewed and Carmina was able to demonstrate them prior to the end of the session.  Carmina demonstrated good  understanding of the education provided.     See EMR under Patient Instructions for exercises provided 9/24/2019.    Assessment     Pt remains with significant tenderness throughout the pelvic floor. Pt was instructed in home use of therawand in order to improve carryover between sessions. She was able to independently perform therawand following therapist hand over hand instruction and anatomical model demonstration. She was encouraged to continue er relaxation exercises as well.  Carmina is progressing well towards her goals.   Pt prognosis is Good.     Pt will continue to benefit from skilled outpatient physical therapy to address the deficits listed in the problem list box on initial evaluation, provide pt/family education and to maximize pt's level of independence in the home and community environment.     Pt's spiritual, cultural and educational needs considered and pt agreeable to plan of care and goals.     Anticipated barriers to physical therapy: none    GOALS     Short Term Goals: 4 weeks   Pt will verbalize improved awareness of PFM activity as palpated by PT in order to improve activity involvement with HEP.  Pt will be independent with diaphragmatic breathing and relaxation techniques in order to improve ability to relax/drop pelvic floor muscles and for down regulation of the CNS.  Pt will report minimal to no pain with single digit pelvic assessment and display relaxation during this assessment in order to progress manual therapy tolerance and home wand/dilator use.        Long Term Goals: 12 weeks     Pt will report improvement in relaxation ability with intimacy (whether this includes external touching only or internal touching) in order to progress towards goals.  Pt will be independent with HEP and self management techniques.  Pt will be able  to achieve penetrative intercourse with discomfort at 3/10 or less throughout experience in order to improve activity tolerance and her sexual relationship with her significant other.  Pt to report being able to have a comfortable vaginal exam without significant increase in pelvic pain.    Plan     Check in about therawand, needing Ashley Holstein, PT

## 2019-12-11 ENCOUNTER — CLINICAL SUPPORT (OUTPATIENT)
Dept: REHABILITATION | Facility: HOSPITAL | Age: 34
End: 2019-12-11
Attending: OBSTETRICS & GYNECOLOGY
Payer: MEDICAID

## 2019-12-11 DIAGNOSIS — M62.838 MUSCLE SPASM: ICD-10-CM

## 2019-12-11 PROCEDURE — 97110 THERAPEUTIC EXERCISES: CPT | Mod: PO

## 2019-12-11 NOTE — PROGRESS NOTES
Physical Therapy Daily Treatment Note     Name: Carmina Hernandez  Clinic Number: [id number]    Therapy Diagnosis:   Encounter Diagnosis   Name Primary?    Muscle spasm      Physician: Morena Monk MD    Visit Date: 12/11/2019    Physician Orders: PT Eval and Treat   Medical Diagnosis: dyspareunia  Evaluation Date: 9/4/2019  Authorization Period Expiration: 12/31/19  Plan of Care Certification Period: 12/4/19  Visit #/Visits authorized: 8/ 20     Time In: 1100 AM  Time Out: 1155 PM  Total Billable Time: 55 minutes    Precautions: Standard    Subjective     Pt reports:she is still having irritation from trying to remove her pubic hair with Veet, No intercourse since last session. She was not as sore after last session.  She was compliant with home exercise program.  Response to previous treatment: no change  Functional change: no change    Pain: 6/10 internally, 0/10 current  Location: R sided post coital for an hour    Objective     Carmina participated in therapeutic exercises to develop ROM for 10 minutes including:  Child's pose 2 x 30s  Happy baby 3 x 30s  Wall adductor stretch 3 x 30s  Piriformis stretch 3 x 30s  MHP to abdomen with Diaphragmatic breathing and body scan meditation-NP    Patient participated in dynamic functional therapeutic activities to improve functional performance for 00 minutes. Including:   Instructed in therawand use to perform releases to B IOs    Carmina received the following manual therapy techniques: Myofacial release and Soft tissue Mobilization were applied for pelvic floor for 45 minutes, including:  STM abdominals and gluteals with piri release  B hip lateral distraction and gentle short axis lumbar distraction  Sacral scrubbing  Patient provided written and verbal consent to receive functional dry needling at today's visit (see consent form scanned into chart). FDN performed to B IO mm with 30 mm needle. FDN performed to reduce pain and muscle tension, promote blood  flow, and improve ROM and function x 5 minutes. Pt with fair tolerance to tx reporting increased pain in L buttock after tx. She was educated on what to expect following the procedure and she verbalized understanding.      Introital stretching 5, 6, 7 oclock-NP  Release to B OIs and R levator ani-NP  SCS B OIs-NP      Home Exercises Provided and Patient Education Provided     Education provided:   - PT plan of care    Written Home Exercises Provided: yes.  Exercises were reviewed and Carmina was able to demonstrate them prior to the end of the session.  Carmina demonstrated good  understanding of the education provided.     See EMR under Patient Instructions for exercises provided 9/24/2019.    Assessment     External manual therapy techniques performed today as patient continues to have skin irritation from using Veet for hair removal. She tolerated all manual therapy to her low back and glutes well without adverse effects. Dry needling also performed today for B OIs. Significant increase in pain and burning in the L buttock following. Pt educated on the effect of increased blood flow to the tissue following needling. She was instructed to use modalities and OTC pain reliever if needed. Soreness should subside within 24 hours. PT will follow up with patient towards end of week. Session ended with some gentle stretched to PFM which patient tolerated well.  Carmina is progressing well towards her goals.   Pt prognosis is Good.     Pt will continue to benefit from skilled outpatient physical therapy to address the deficits listed in the problem list box on initial evaluation, provide pt/family education and to maximize pt's level of independence in the home and community environment.     Pt's spiritual, cultural and educational needs considered and pt agreeable to plan of care and goals.     Anticipated barriers to physical therapy: none    GOALS     Short Term Goals: 4 weeks   Pt will verbalize improved awareness  of PFM activity as palpated by PT in order to improve activity involvement with HEP.  Pt will be independent with diaphragmatic breathing and relaxation techniques in order to improve ability to relax/drop pelvic floor muscles and for down regulation of the CNS.  Pt will report minimal to no pain with single digit pelvic assessment and display relaxation during this assessment in order to progress manual therapy tolerance and home wand/dilator use.        Long Term Goals: 12 weeks     Pt will report improvement in relaxation ability with intimacy (whether this includes external touching only or internal touching) in order to progress towards goals.  Pt will be independent with HEP and self management techniques.  Pt will be able to achieve penetrative intercourse with discomfort at 3/10 or less throughout experience in order to improve activity tolerance and her sexual relationship with her significant other.  Pt to report being able to have a comfortable vaginal exam without significant increase in pelvic pain.    Plan     Check in about therhimanshu, pain science, assess response to dry needling

## 2019-12-17 ENCOUNTER — CLINICAL SUPPORT (OUTPATIENT)
Dept: REHABILITATION | Facility: HOSPITAL | Age: 34
End: 2019-12-17
Attending: OBSTETRICS & GYNECOLOGY
Payer: MEDICAID

## 2019-12-17 DIAGNOSIS — M62.838 MUSCLE SPASM: ICD-10-CM

## 2019-12-17 PROCEDURE — 97110 THERAPEUTIC EXERCISES: CPT | Mod: PO

## 2019-12-17 NOTE — PROGRESS NOTES
Physical Therapy Daily Treatment Note     Name: Carmina Hernandez  Clinic Number: [id number]    Therapy Diagnosis:   Encounter Diagnosis   Name Primary?    Muscle spasm      Physician: Morena Monk MD    Visit Date: 12/17/2019    Physician Orders: PT Eval and Treat   Medical Diagnosis: dyspareunia  Evaluation Date: 9/4/2019  Authorization Period Expiration: 12/31/19  Plan of Care Certification Period: 12/4/19  Visit #/Visits authorized: 9/ 20     Time In: 1105 AM  Time Out: 1145 PM  Total Billable Time: 40 minutes    Precautions: Standard    Subjective     Pt reports: she was extremely sore after dry needling. Lasted almost a full week. She tried the wand on Sunday, and it was extremely painful. No attempt at intercourse since last session. No pain with urination or BMs. She has random pain throughout the day on both sides which linger. She has bee stretching, not really breathing.  She was compliant with home exercise program.  Response to previous treatment: no change  Functional change: no change    Pain: 3/10 internally, 0/10 current  Location: R sided post coital for an hour    Objective     Carmina participated in therapeutic exercises to develop ROM for 10 minutes including:  Child's pose 2 x 30s  Happy baby 3 x 30s  Wall adductor stretch 3 x 30s  Piriformis stretch 3 x 30s  Diaphragmatic breathing    Patient participated in dynamic functional therapeutic activities to improve functional performance for 00 minutes. Including:   Instructed in therawand use to perform releases to B IOs    Carmina received the following manual therapy techniques: Myofacial release and Soft tissue Mobilization were applied for pelvic floor for 30 minutes, including:  STM abdominals and gluteals with piri release  Sacral scrubbing  Introital stretching 5, 6, 7 oclock, STP B SCS    Home Exercises Provided and Patient Education Provided     Education provided:   - PT plan of care    Written Home Exercises Provided:  yes.  Exercises were reviewed and Carmina was able to demonstrate them prior to the end of the session.  Carmina demonstrated good  understanding of the education provided.     See EMR under Patient Instructions for exercises provided 9/24/2019.    Assessment     Pain continues to be flared up following dry needling. Superficial structures focused on as patient was hypersensitive to palpation internally. Assured patient that tissues were not damaged, and that she should focus on pelvic floor check ins and relaxation along with diaphragmatic breathing to assist with full excursion of PFMs. Pt also encouraged to find improved coping/stress mgmt skills in order to help decrease muscle tension.  Carmina is progressing well towards her goals.   Pt prognosis is Good.     Pt will continue to benefit from skilled outpatient physical therapy to address the deficits listed in the problem list box on initial evaluation, provide pt/family education and to maximize pt's level of independence in the home and community environment.     Pt's spiritual, cultural and educational needs considered and pt agreeable to plan of care and goals.     Anticipated barriers to physical therapy: none    GOALS     Short Term Goals: 4 weeks   Pt will verbalize improved awareness of PFM activity as palpated by PT in order to improve activity involvement with HEP.  Pt will be independent with diaphragmatic breathing and relaxation techniques in order to improve ability to relax/drop pelvic floor muscles and for down regulation of the CNS.  Pt will report minimal to no pain with single digit pelvic assessment and display relaxation during this assessment in order to progress manual therapy tolerance and home wand/dilator use.        Long Term Goals: 12 weeks     Pt will report improvement in relaxation ability with intimacy (whether this includes external touching only or internal touching) in order to progress towards goals.  Pt will be  independent with HEP and self management techniques.  Pt will be able to achieve penetrative intercourse with discomfort at 3/10 or less throughout experience in order to improve activity tolerance and her sexual relationship with her significant other.  Pt to report being able to have a comfortable vaginal exam without significant increase in pelvic pain.    Plan     Check in about therhimanshu, pain science, assess response to dry needling

## 2019-12-17 NOTE — PATIENT INSTRUCTIONS
Start with Diaphragmatic breathing only for 3-4 days.    Home Exercise Program: 12/17/2019    Guided Meditation for Pelvic Floor Relaxation  [employer]    [email or web address]    Perform Body scan mediation    Reincorporate stretched on day 4-7.    Begin performing introital stretch after 1 week.    Home Exercise Program: 09/04/2019    Introital Stretch    1. Wash hands thoroughly with antibacterial soap.  2. Lay down comfortably with your back and head well supported by several pillows.  3. Apply water-based lubricant (ie. Slippery stuff, coconut oil, olive oil) on your thumb and perineum.  4. Place one thumb to 1st knuckle just inside the vagina.  5. Gently press downward at 6 o' clock. Hold for 90 seconds. Perform Diaphragmatic breathing while holding the stretch. Repeat at 5 and 7 o' clock.  6. The amount of pressure for the stretch may cause discomfort, but not pain (ie. You can replicate the stretching sensation by bending your finger backward). Don't go above 4-5/10 pain during or after the exericse.  7. Focus on relaxed breathing and keeping the pelvic floor muscles dropped.   8. Continue for 5-10 minutes, 3-4 times per week.    STOP if there is increased bleeding, an infection, or extreme pain.       Some may prefer their partner to assist them or to perform the massage for them. Your partner needs to use clean hands and gently insert one or two index fingers inside the lower part of the vagina and follow the steps listed above. It is important to tell your partner how much pressure to apply without causing pain.

## 2020-01-07 NOTE — PROGRESS NOTES
"  Physical Therapy Daily Treatment Note     Name: Carmina Hernandez  Clinic Number: [id number]    Therapy Diagnosis:   Encounter Diagnosis   Name Primary?    Muscle spasm      Physician: Morena Monk MD    Visit Date: 1/8/2020    Physician Orders: PT Eval and Treat   Medical Diagnosis: dyspareunia  Evaluation Date: 9/4/2019  Authorization Period Expiration: 12/31/19  Plan of Care Certification Period: 4/8/19  Visit #/Visits authorized: 10/ 20     Time In: 1105 AM  Time Out: 1200 PM  Total Billable Time: 55 minutes    Precautions: Standard    Subjective     Pt reports: sex is virtually non existent now. She attempted at it was the most painful thing ever. She did have intercourse after having used weed, and she was able to have pain-free intercourse.  She was compliant with home exercise program.  Response to previous treatment: no change  Functional change: no change    Pain: 3/10 internally, 0/10 current  Location: R sided post coital for an hour    Objective   +TTP B STP, B OIS, B levator ani  Increased muscle spasm noted upon entrance with difficulty on insertion    Carmina participated in therapeutic exercises to develop ROM for 10 minutes including:    B hip IR stretch with overpressure  Happy baby 3 x 30s  Wall adductor stretch 3 x 30s  Diaphragmatic breathing  Piriformis stretch 3 x 30s    Not performed:  Child's pose 2 x 30s      Carmina received the following manual therapy techniques: Myofacial release and Soft tissue Mobilization were applied for pelvic floor for 35 minutes, including:  STM abdominals, low back, and gluteals with piri release  Sacral scrubbing  B psoas release    Patient participated in dynamic functional therapeutic activities to improve functional performance for 10 minutes. Including:   CNS contribution to pain "fight or flight" symptom  Focus on external touch with relaxation  Looking at perineum with mirror to assess excursion of pelvic floor and to increase connection with " PFM    Home Exercises Provided and Patient Education Provided     Education provided:   - PT plan of care    Written Home Exercises Provided: yes.  Exercises were reviewed and Carmina was able to demonstrate them prior to the end of the session.  Carmina demonstrated good  understanding of the education provided.     See EMR under Patient Instructions for exercises provided 9/24/2019.    Assessment     Pt had a pain flare up and set back. She is progressing towards goals; however, she is mostly unchanged from initial eval at this time due to flare up. Focused on external releases due to continued increased pain with internal work. PT believes pt needs more time for down training and to take a step back from internal work at this time as her pain level continues to be increased. She was encouraged to perform DB with external vaginal touch from self and partner to help with relaxation. She was also encouraged to perform more stretching sarah to hip IRs as they were tight upon assessment. External myofascial releases emphasized today without adverse effects.     Carmina is progressing well towards her goals.   Pt prognosis is Good.     Pt will continue to benefit from skilled outpatient physical therapy to address the deficits listed in the problem list box on initial evaluation, provide pt/family education and to maximize pt's level of independence in the home and community environment.     Pt's spiritual, cultural and educational needs considered and pt agreeable to plan of care and goals.     Anticipated barriers to physical therapy: none    GOALS     Short Term Goals: 4 weeks   Pt will verbalize improved awareness of PFM activity as palpated by PT in order to improve activity involvement with HEP. Progressing 1/8/2020   Pt will be independent with diaphragmatic breathing and relaxation techniques in order to improve ability to relax/drop pelvic floor muscles and for down regulation of the CNS. Progressing 1/8/2020    Pt will report minimal to no pain with single digit pelvic assessment and display relaxation during this assessment in order to progress manual therapy tolerance and home wand/dilator use. Progressing 1/8/2020         Long Term Goals: 12 weeks     Pt will report improvement in relaxation ability with intimacy (whether this includes external touching only or internal touching) in order to progress towards goals.  Pt will be independent with HEP and self management techniques.  Pt will be able to achieve penetrative intercourse with discomfort at 3/10 or less throughout experience in order to improve activity tolerance and her sexual relationship with her significant other.  Pt to report being able to have a comfortable vaginal exam without significant increase in pelvic pain.    Plan     CNS downtraining FOCUS, pain EDUCATION

## 2020-01-08 ENCOUNTER — CLINICAL SUPPORT (OUTPATIENT)
Dept: REHABILITATION | Facility: HOSPITAL | Age: 35
End: 2020-01-08
Attending: OBSTETRICS & GYNECOLOGY
Payer: MEDICAID

## 2020-01-08 DIAGNOSIS — M62.838 MUSCLE SPASM: ICD-10-CM

## 2020-01-08 PROCEDURE — 97110 THERAPEUTIC EXERCISES: CPT | Mod: PO

## 2020-01-08 NOTE — PLAN OF CARE
"  Physical Therapy Daily Treatment Note     Name: Carmina Hernandez  Clinic Number: [id number]    Therapy Diagnosis:   Encounter Diagnosis   Name Primary?    Muscle spasm      Physician: Morena Monk MD    Visit Date: 1/8/2020    Physician Orders: PT Eval and Treat   Medical Diagnosis: dyspareunia  Evaluation Date: 9/4/2019  Authorization Period Expiration: 12/31/19  Plan of Care Certification Period: 4/8/19  Visit #/Visits authorized: 10/ 20     Time In: 1105 AM  Time Out: 1200 PM  Total Billable Time: 55 minutes    Precautions: Standard    Subjective     Pt reports: sex is virtually non existent now. She attempted at it was the most painful thing ever. She did have intercourse after having used weed, and she was able to have pain-free intercourse.  She was compliant with home exercise program.  Response to previous treatment: no change  Functional change: no change    Pain: 3/10 internally, 0/10 current  Location: R sided post coital for an hour    Objective   +TTP B STP, B OIS, B levator ani  Increased muscle spasm noted upon entrance with difficulty on insertion    Carmina participated in therapeutic exercises to develop ROM for 10 minutes including:    B hip IR stretch with overpressure  Happy baby 3 x 30s  Wall adductor stretch 3 x 30s  Diaphragmatic breathing  Piriformis stretch 3 x 30s    Not performed:  Child's pose 2 x 30s      Carmina received the following manual therapy techniques: Myofacial release and Soft tissue Mobilization were applied for pelvic floor for 35 minutes, including:  STM abdominals, low back, and gluteals with piri release  Sacral scrubbing  B psoas release    Patient participated in dynamic functional therapeutic activities to improve functional performance for 10 minutes. Including:   CNS contribution to pain "fight or flight" symptom  Focus on external touch with relaxation  Looking at perineum with mirror to assess excursion of pelvic floor and to increase connection with " PFM    Home Exercises Provided and Patient Education Provided     Education provided:   - PT plan of care    Written Home Exercises Provided: yes.  Exercises were reviewed and Carmina was able to demonstrate them prior to the end of the session.  Carmina demonstrated good  understanding of the education provided.     See EMR under Patient Instructions for exercises provided 9/24/2019.    Assessment     Pt had a pain flare up and set back. She is progressing towards goals; however, she is mostly unchanged from initial eval at this time due to flare up. Focused on external releases due to continued increased pain with internal work. PT believes pt needs more time for down training and to take a step back from internal work at this time as her pain level continues to be increased. She was encouraged to perform DB with external vaginal touch from self and partner to help with relaxation. She was also encouraged to perform more stretching sarah to hip IRs as they were tight upon assessment. External myofascial releases emphasized today without adverse effects.     Carmina is progressing well towards her goals.   Pt prognosis is Good.     Pt will continue to benefit from skilled outpatient physical therapy to address the deficits listed in the problem list box on initial evaluation, provide pt/family education and to maximize pt's level of independence in the home and community environment.     Pt's spiritual, cultural and educational needs considered and pt agreeable to plan of care and goals.     Anticipated barriers to physical therapy: none    GOALS     Short Term Goals: 4 weeks   Pt will verbalize improved awareness of PFM activity as palpated by PT in order to improve activity involvement with HEP. Progressing 1/8/2020   Pt will be independent with diaphragmatic breathing and relaxation techniques in order to improve ability to relax/drop pelvic floor muscles and for down regulation of the CNS. Progressing 1/8/2020    Pt will report minimal to no pain with single digit pelvic assessment and display relaxation during this assessment in order to progress manual therapy tolerance and home wand/dilator use. Progressing 1/8/2020         Long Term Goals: 12 weeks     Pt will report improvement in relaxation ability with intimacy (whether this includes external touching only or internal touching) in order to progress towards goals.  Pt will be independent with HEP and self management techniques.  Pt will be able to achieve penetrative intercourse with discomfort at 3/10 or less throughout experience in order to improve activity tolerance and her sexual relationship with her significant other.  Pt to report being able to have a comfortable vaginal exam without significant increase in pelvic pain.    Plan     Certification Period: 1/8/2020 to 4/8/20    Outpatient Physical Therapy 1 time(s) every week(s) for 12 weeks to include the following interventions: patient education, HEP, therapeutic exercises, neuromuscular re-education, therapeutic activity, manual therapy and dry needling, and modalities PRN to achieve established goals.     Ashley Holstein, PT      CNS downtraining FOCUS, pain EDUCATION    I have reviewed and concur with the history, physical assessment and plan.   Morena Monk MD  Obstetrics and Gynecology  [facility]

## 2020-01-17 ENCOUNTER — CLINICAL SUPPORT (OUTPATIENT)
Dept: REHABILITATION | Facility: HOSPITAL | Age: 35
End: 2020-01-17
Attending: OBSTETRICS & GYNECOLOGY
Payer: MEDICAID

## 2020-01-17 DIAGNOSIS — M62.838 MUSCLE SPASM: ICD-10-CM

## 2020-01-17 PROCEDURE — 97110 THERAPEUTIC EXERCISES: CPT | Mod: PO

## 2020-01-17 NOTE — PROGRESS NOTES
"  Physical Therapy Daily Treatment Note     Name: Carmina Hernandez  Clinic Number: [id number]    Therapy Diagnosis:   Encounter Diagnosis   Name Primary?    Muscle spasm      Physician: Morena Monk MD    Visit Date: 1/17/2020    Physician Orders: PT Eval and Treat   Medical Diagnosis: dyspareunia  Evaluation Date: 9/4/2019  Authorization Period Expiration: 12/31/19  Plan of Care Certification Period: 4/8/19  Visit #/Visits authorized: 10/ 20     Time In: 1105 AM  Time Out: 1200 PM  Total Billable Time: 55 minutes    Precautions: Standard    Subjective     Pt reports: did her exercises consistently. Had sex and it was painful but also felt more of like it was trying to release her muscles.  She was compliant with home exercise program.  Response to previous treatment: no change  Functional change: no change    Pain: 3/10 internally, 0/10 current  Location: R sided post coital for an hour    Objective     Carmina participated in therapeutic exercises to develop ROM for 00 minutes including:    B hip IR stretch with overpressure  Happy baby 3 x 30s  Wall adductor stretch 3 x 30s  Diaphragmatic breathing  Piriformis stretch 3 x 30s    Not performed:  Child's pose 2 x 30s      Carmina received the following manual therapy techniques: Myofacial release and Soft tissue Mobilization were applied for pelvic floor for 40 minutes, including:  STM abdominals, low back, and gluteals with piri release  Sacral scrubbing  Introital stretching    Patient participated in dynamic functional therapeutic activities to improve functional performance for 15 minutes. Including:   CNS contribution to pain "fight or flight" symptom with 'Tame [employer] Beast" youtube video by Christi High  Focus on internal introital touch with relaxation and DB both by patient and partner  Looking at perineum with mirror to assess excursion of pelvic floor and to increase connection with PFM  Pelvic check in    Home Exercises Provided and Patient " Education Provided     Education provided:   - PT plan of care    Written Home Exercises Provided: yes.  Exercises were reviewed and Carmina was able to demonstrate them prior to the end of the session.  Carmina demonstrated good  understanding of the education provided.     See EMR under Patient Instructions for exercises provided 9/24/2019.    Assessment     Good tolerance to tx session today. Pain science education continues to increase patient's knowledge and understanding about the brain's contribution to pain. Pt encouraged to increase mindfulness and body awareness through meditation, pelvic check ins, DB, and self/partner assisted touch. In addition, soft tissue mobilization performed globally externally and at rh introitus. No increases in discomfort present in the pelvic floor with introital stretching. This is an improvement from previous sessions.    Carmina is progressing well towards her goals.   Pt prognosis is Good.     Pt will continue to benefit from skilled outpatient physical therapy to address the deficits listed in the problem list box on initial evaluation, provide pt/family education and to maximize pt's level of independence in the home and community environment.     Pt's spiritual, cultural and educational needs considered and pt agreeable to plan of care and goals.     Anticipated barriers to physical therapy: none    GOALS     Short Term Goals: 4 weeks   Pt will verbalize improved awareness of PFM activity as palpated by PT in order to improve activity involvement with HEP. Progressing 1/17/2020   Pt will be independent with diaphragmatic breathing and relaxation techniques in order to improve ability to relax/drop pelvic floor muscles and for down regulation of the CNS. Progressing 1/8/2020   Pt will report minimal to no pain with single digit pelvic assessment and display relaxation during this assessment in order to progress manual therapy tolerance and home wand/dilator use.  Progressing 1/8/2020         Long Term Goals: 12 weeks     Pt will report improvement in relaxation ability with intimacy (whether this includes external touching only or internal touching) in order to progress towards goals.  Pt will be independent with HEP and self management techniques.  Pt will be able to achieve penetrative intercourse with discomfort at 3/10 or less throughout experience in order to improve activity tolerance and her sexual relationship with her significant other.  Pt to report being able to have a comfortable vaginal exam without significant increase in pelvic pain.    Plan     CNS downtraining FOCUS, pain EDUCATION, work manually slightly deeper if tolerable

## 2020-01-29 ENCOUNTER — CLINICAL SUPPORT (OUTPATIENT)
Dept: REHABILITATION | Facility: HOSPITAL | Age: 35
End: 2020-01-29
Attending: OBSTETRICS & GYNECOLOGY
Payer: MEDICAID

## 2020-01-29 DIAGNOSIS — M62.838 MUSCLE SPASM: ICD-10-CM

## 2020-01-29 PROCEDURE — 97110 THERAPEUTIC EXERCISES: CPT | Mod: PO

## 2020-01-29 NOTE — PROGRESS NOTES
"  Physical Therapy Daily Treatment Note     Name: Carmina Hernandez  Clinic Number: [id number]    Therapy Diagnosis:   Encounter Diagnosis   Name Primary?    Muscle spasm      Physician: Morena Monk MD    Visit Date: 1/29/2020    Physician Orders: PT Eval and Treat   Medical Diagnosis: dyspareunia  Evaluation Date: 9/4/2019  Authorization Period Expiration: 12/31/19  Plan of Care Certification Period: 4/8/19  Visit #/Visits authorized: 11/ 20     Time In: 1100 AM  Time Out: 1157 AM  Total Billable Time: 57 minutes    Precautions: Standard    Subjective     Pt reports: she attempted intercourse, and her partner was able to insert without pain. Once he began thrusting deep, it became painful. She is getting really frustrated and feels "broken". She would like to give up on sex totally.  She was compliant with home exercise program.  Response to previous treatment: no change  Functional change: no change    Pain: 3/10 internally, 0/10 current  Location: R sided post coital for an hour    Objective     Carmina participated in therapeutic exercises to develop ROM for 00 minutes including:    B hip IR stretch with overpressure  Happy baby 3 x 30s  Wall adductor stretch 3 x 30s  Diaphragmatic breathing  Piriformis stretch 3 x 30s    Not performed:  Child's pose 2 x 30s      Carmina received the following manual therapy techniques: Myofacial release and Soft tissue Mobilization were applied for pelvic floor for 45 minutes, including:  STM abdominals, low back, and gluteals with piri release  Sacral scrubbing  Introital stretching  Release to B levator ani    Patient participated in dynamic functional therapeutic activities to improve functional performance for 10 minutes. Including:   Stretching after gym workouts (glutes, abdominals, piri)  COmpliance with diaphragmatic breathing and CNS downtraining techniques  Pelvic check ins    Home Exercises Provided and Patient Education Provided     Education provided:   - PT " "plan of care    Written Home Exercises Provided: yes.  Exercises were reviewed and Carmina was able to demonstrate them prior to the end of the session.  Carmina demonstrated good  understanding of the education provided.     See EMR under Patient Instructions for exercises provided 9/24/2019.    Assessment     Pt has significant tenderness in the B OIs and B iliococcygeus and coccygeus mm group. Reported pain was an 11/10 on the VAS scale. No pain with initial insertion which has improved since SOC. Pt very discouraged regarding progress at this time, and she repeatedly used the phrase "my vagina is broken." Pt encouraged to keep up with her breathing and CNS down training but to take a brief break from intercourse, wand training, and pelvic floor PT to regroup. She was encouraged to focus on things which make her happy such as exercise and spending time with her family instead of focusing on her pain and repeatedly performing activities which are perceived as severely painful. Pt agreeable to plan at this time. She was also encouraged to increase her stretching at the gym following exercise.    Carmina is progressing well towards her goals.   Pt prognosis is Good.     Pt will continue to benefit from skilled outpatient physical therapy to address the deficits listed in the problem list box on initial evaluation, provide pt/family education and to maximize pt's level of independence in the home and community environment.     Pt's spiritual, cultural and educational needs considered and pt agreeable to plan of care and goals.     Anticipated barriers to physical therapy: none    GOALS     Short Term Goals: 4 weeks   Pt will verbalize improved awareness of PFM activity as palpated by PT in order to improve activity involvement with HEP. Progressing 1/29/2020   Pt will be independent with diaphragmatic breathing and relaxation techniques in order to improve ability to relax/drop pelvic floor muscles and for down " regulation of the CNS. Met 1/29/20  Pt will report minimal to no pain with single digit pelvic assessment and display relaxation during this assessment in order to progress manual therapy tolerance and home wand/dilator use. Progressing 1/8/2020         Long Term Goals: 12 weeks     Pt will report improvement in relaxation ability with intimacy (whether this includes external touching only or internal touching) in order to progress towards goals.  Pt will be independent with HEP and self management techniques.  Pt will be able to achieve penetrative intercourse with discomfort at 3/10 or less throughout experience in order to improve activity tolerance and her sexual relationship with her significant other.  Pt to report being able to have a comfortable vaginal exam without significant increase in pelvic pain.    Plan     Reassess at next visit

## 2020-02-20 ENCOUNTER — OFFICE VISIT (OUTPATIENT)
Dept: OBSTETRICS AND GYNECOLOGY | Facility: CLINIC | Age: 35
End: 2020-02-20
Payer: MEDICAID

## 2020-02-20 VITALS
WEIGHT: 147.94 LBS | SYSTOLIC BLOOD PRESSURE: 110 MMHG | BODY MASS INDEX: 27.22 KG/M2 | DIASTOLIC BLOOD PRESSURE: 60 MMHG | HEIGHT: 62 IN

## 2020-02-20 DIAGNOSIS — Z12.4 CERVICAL CANCER SCREENING: Primary | ICD-10-CM

## 2020-02-20 DIAGNOSIS — M79.18 MYALGIA OF PELVIC FLOOR: ICD-10-CM

## 2020-02-20 PROCEDURE — 99214 PR OFFICE/OUTPT VISIT, EST, LEVL IV, 30-39 MIN: ICD-10-PCS | Mod: S$PBB,,, | Performed by: OBSTETRICS & GYNECOLOGY

## 2020-02-20 PROCEDURE — 99214 OFFICE O/P EST MOD 30 MIN: CPT | Mod: S$PBB,,, | Performed by: OBSTETRICS & GYNECOLOGY

## 2020-02-20 PROCEDURE — 99213 OFFICE O/P EST LOW 20 MIN: CPT | Mod: PBBFAC | Performed by: OBSTETRICS & GYNECOLOGY

## 2020-02-20 PROCEDURE — 87624 HPV HI-RISK TYP POOLED RSLT: CPT

## 2020-02-20 PROCEDURE — 99999 PR PBB SHADOW E&M-EST. PATIENT-LVL III: ICD-10-PCS | Mod: PBBFAC,,, | Performed by: OBSTETRICS & GYNECOLOGY

## 2020-02-20 PROCEDURE — 99999 PR PBB SHADOW E&M-EST. PATIENT-LVL III: CPT | Mod: PBBFAC,,, | Performed by: OBSTETRICS & GYNECOLOGY

## 2020-02-20 PROCEDURE — 88175 CYTOPATH C/V AUTO FLUID REDO: CPT

## 2020-02-20 RX ORDER — GABAPENTIN 100 MG/1
CAPSULE ORAL
Qty: 90 CAPSULE | Refills: 11 | Status: SHIPPED | OUTPATIENT
Start: 2020-02-20 | End: 2021-08-27

## 2020-02-20 NOTE — PROGRESS NOTES
CC: 35 yo  here for fu visit, pelvic floor myalgia     HPI: Carmina is overall well today.  She is a  s/p .  Moved from [place]. Last pap was 2.5 years ago. I last saw her for [employer]. Reported painful intercourse. Partner may have had some infidelity. No vaginal discharge. Regular cycles, painful for first 2 days. Tried birth control before and no improvement in her cycles. Didn't really like the birth control pills and would prefer to avoid these. Also has some heavy bleeding. Partner has had a vasectomy. She has a 7 yo child. Pain with insertion with intercourse. No current stressors other than being a mom. She works out 2 hours per day. Not working currently. No history of sexual trauma.  is a pharmacist.     She has been going to pelvic floor PT. She has been seeing pelvic floor PT very frequently - initially helpful and it has helped with initial pain with penetration, but now suffering with severe pain with deep penetration. Makes her not want to be sexually active. She was working on doing accupuncture with pelvic floor PT but almost making it worse. She just feels frustrated. Really prefers not to use meds. Of note, she did use edibles when she was on vacation (cannabis) and had absolutely no pain with intercourse.     History reviewed. No pertinent past medical history.    History reviewed. No pertinent surgical history.    OB History        1    Para   1    Term   1            AB        Living   1       SAB        TAB        Ectopic        Multiple        Live Births   1                 No current outpatient medications on file prior to visit.     No current facility-administered medications on file prior to visit.        ROS:  GENERAL: Denies weight gain or weight loss. Feeling well overall.   SKIN: Denies rash or lesions.   HEAD: Denies head injury or headache.   CHEST: Denies chest pain or shortness of breath.   CARDIOVASCULAR: Denies palpitations or left sided chest pain.  "  ABDOMEN: see HPI  URINARY: No frequency, dysuria, hematuria or burning on urination.  REPRODUCTIVE: See HPI.   HEMATOLOGIC: No easy bruisability or excessive bleeding.   MUSCULOSKELETAL: Denies joint pain or swelling.     Physical Exam:   /60   Ht 5' 2" (1.575 m)   Wt 67.1 kg (147 lb 14.9 oz)   LMP 01/26/2020 (Exact Date)   BMI 27.06 kg/m²   General: No distress, well appearing  HEENT: normocephalic, atraumatic   Heart: Regular rate  Lungs: No increased work of breathing  Abdomen: soft, nontender, no masses  MS: lower extremeties symmetrical, no edema  Pelvic Exam:     GENITALIA: Normal external genitalia, no lesions   URETHRA: normal appearing   Bladder non tender   VAGINA: normal vaginal mucosa, no lesions, she has significant pelvic floor tenderness on exam along posterior vaginal wall consistent with myofascial pelvic pain   CERVIX: no lesions visible, no CMT    UTERUS: small, mobile, non tender   ADNEXA: no adnexal masses, tenderness or fullness  PSYCH: Normal affect, mood appropriate     ASSESSMENT/PLAN: 35 yo with pelvic floor myalgia.     1. Continue seeing pelvic floor PT  2. Will try gabapentin - 100 mg daily x 1 week, then increase to BID x 1 week, then TID. Risks reviewed.   3. Pap with HPV co-testing  4. Referral to Dr. Cordova   5. Pelvic US  6. Follow up with me via portal in 2 months to let me know how she is doing    Morena Monk MD  Obstetrics and Gynecology  [facility]    "

## 2020-02-25 LAB
HPV HR 12 DNA SPEC QL NAA+PROBE: NEGATIVE
HPV16 AG SPEC QL: NEGATIVE
HPV18 DNA SPEC QL NAA+PROBE: NEGATIVE

## 2020-03-05 ENCOUNTER — HOSPITAL ENCOUNTER (OUTPATIENT)
Dept: RADIOLOGY | Facility: OTHER | Age: 35
Discharge: HOME | End: 2020-03-05
Attending: OBSTETRICS & GYNECOLOGY
Payer: MEDICAID

## 2020-03-05 DIAGNOSIS — M79.18 MYALGIA OF PELVIC FLOOR: ICD-10-CM

## 2020-03-05 PROCEDURE — 76830 US PELVIS COMP WITH TRANSVAG NON-OB (XPD): ICD-10-PCS | Mod: 26,,, | Performed by: RADIOLOGY

## 2020-03-05 PROCEDURE — 76856 US EXAM PELVIC COMPLETE: CPT | Mod: TC

## 2020-03-05 PROCEDURE — 76856 US EXAM PELVIC COMPLETE: CPT | Mod: 26,,, | Performed by: RADIOLOGY

## 2020-03-05 PROCEDURE — 76856 US PELVIS COMP WITH TRANSVAG NON-OB (XPD): ICD-10-PCS | Mod: 26,,, | Performed by: RADIOLOGY

## 2020-03-05 PROCEDURE — 76830 TRANSVAGINAL US NON-OB: CPT | Mod: 26,,, | Performed by: RADIOLOGY

## 2020-03-23 LAB
FINAL PATHOLOGIC DIAGNOSIS: NORMAL
Lab: NORMAL

## 2020-03-24 ENCOUNTER — DOCUMENTATION ONLY (OUTPATIENT)
Dept: REHABILITATION | Facility: HOSPITAL | Age: 35
End: 2020-03-24

## 2020-03-24 DIAGNOSIS — M62.838 MUSCLE SPASM: ICD-10-CM

## 2020-03-24 NOTE — PROGRESS NOTES
Outpatient Therapy Discharge Summary     Name: Carmina Hernandez  [facility] Number: [id number]    Therapy Diagnosis:   Encounter Diagnosis   Name Primary?    Muscle spasm      Physician: No ref. provider found    Physician Orders: PT Eval and Treat   Medical Diagnosis: dyspareunia  Evaluation Date: 9/4/2019      Date of Last visit: 1/29/20  Total Visits Received: 11      Assessment    Short Term Goals: 4 weeks   Pt will verbalize improved awareness of PFM activity as palpated by PT in order to improve activity involvement with HEP. Progressing 1/29/2020   Pt will be independent with diaphragmatic breathing and relaxation techniques in order to improve ability to relax/drop pelvic floor muscles and for down regulation of the CNS. Met 1/29/20  Pt will report minimal to no pain with single digit pelvic assessment and display relaxation during this assessment in order to progress manual therapy tolerance and home wand/dilator use. Progressing 1/8/2020         Long Term Goals: 12 weeks     Pt will report improvement in relaxation ability with intimacy (whether this includes external touching only or internal touching) in order to progress towards goals.  Pt will be independent with HEP and self management techniques.  Pt will be able to achieve penetrative intercourse with discomfort at 3/10 or less throughout experience in order to improve activity tolerance and her sexual relationship with her significant other.  Pt to report being able to have a comfortable vaginal exam without significant increase in pelvic pain.      Discharge reason: Patient has reached the maximum rehab potential for the present time    Plan   This patient is discharged from Physical Therapy

## 2020-08-13 RX ORDER — LEVONORGESTREL AND ETHINYL ESTRADIOL 0.1-0.02MG
1 KIT ORAL DAILY
Qty: 90 TABLET | Refills: 3 | Status: SHIPPED | OUTPATIENT
Start: 2020-08-13 | End: 2021-08-27

## 2021-04-08 ENCOUNTER — LAB VISIT (OUTPATIENT)
Dept: LAB | Facility: OTHER | Age: 36
End: 2021-04-08
Attending: OBSTETRICS & GYNECOLOGY
Payer: COMMERCIAL

## 2021-04-08 ENCOUNTER — OFFICE VISIT (OUTPATIENT)
Dept: OBSTETRICS AND GYNECOLOGY | Facility: CLINIC | Age: 36
End: 2021-04-08
Payer: COMMERCIAL

## 2021-04-08 VITALS — BODY MASS INDEX: 26.9 KG/M2 | DIASTOLIC BLOOD PRESSURE: 67 MMHG | SYSTOLIC BLOOD PRESSURE: 122 MMHG | WEIGHT: 147.06 LBS

## 2021-04-08 DIAGNOSIS — Z11.3 ROUTINE SCREENING FOR STI (SEXUALLY TRANSMITTED INFECTION): ICD-10-CM

## 2021-04-08 DIAGNOSIS — B97.7 HPV IN FEMALE: Primary | ICD-10-CM

## 2021-04-08 LAB
C TRACH DNA SPEC QL NAA+PROBE: NOT DETECTED
N GONORRHOEA DNA SPEC QL NAA+PROBE: NOT DETECTED

## 2021-04-08 PROCEDURE — 87491 CHLMYD TRACH DNA AMP PROBE: CPT | Mod: 59 | Performed by: OBSTETRICS & GYNECOLOGY

## 2021-04-08 PROCEDURE — 87591 N.GONORRHOEAE DNA AMP PROB: CPT | Mod: 59 | Performed by: OBSTETRICS & GYNECOLOGY

## 2021-04-08 PROCEDURE — 86803 HEPATITIS C AB TEST: CPT | Performed by: OBSTETRICS & GYNECOLOGY

## 2021-04-08 PROCEDURE — 86703 HIV-1/HIV-2 1 RESULT ANTBDY: CPT | Performed by: OBSTETRICS & GYNECOLOGY

## 2021-04-08 PROCEDURE — 87661 TRICHOMONAS VAGINALIS AMPLIF: CPT | Performed by: OBSTETRICS & GYNECOLOGY

## 2021-04-08 PROCEDURE — 99999 PR PBB SHADOW E&M-EST. PATIENT-LVL II: ICD-10-PCS | Mod: PBBFAC,,, | Performed by: OBSTETRICS & GYNECOLOGY

## 2021-04-08 PROCEDURE — 36415 COLL VENOUS BLD VENIPUNCTURE: CPT | Performed by: OBSTETRICS & GYNECOLOGY

## 2021-04-08 PROCEDURE — 99395 PREV VISIT EST AGE 18-39: CPT | Mod: S$GLB,,, | Performed by: OBSTETRICS & GYNECOLOGY

## 2021-04-08 PROCEDURE — 99395 PR PREVENTIVE VISIT,EST,18-39: ICD-10-PCS | Mod: S$GLB,,, | Performed by: OBSTETRICS & GYNECOLOGY

## 2021-04-08 PROCEDURE — 3008F PR BODY MASS INDEX (BMI) DOCUMENTED: ICD-10-PCS | Mod: CPTII,S$GLB,, | Performed by: OBSTETRICS & GYNECOLOGY

## 2021-04-08 PROCEDURE — 1126F PR PAIN SEVERITY QUANTIFIED, NO PAIN PRESENT: ICD-10-PCS | Mod: S$GLB,,, | Performed by: OBSTETRICS & GYNECOLOGY

## 2021-04-08 PROCEDURE — 3008F BODY MASS INDEX DOCD: CPT | Mod: CPTII,S$GLB,, | Performed by: OBSTETRICS & GYNECOLOGY

## 2021-04-08 PROCEDURE — 87481 CANDIDA DNA AMP PROBE: CPT | Mod: 59 | Performed by: OBSTETRICS & GYNECOLOGY

## 2021-04-08 PROCEDURE — 87340 HEPATITIS B SURFACE AG IA: CPT | Performed by: OBSTETRICS & GYNECOLOGY

## 2021-04-08 PROCEDURE — 1126F AMNT PAIN NOTED NONE PRSNT: CPT | Mod: S$GLB,,, | Performed by: OBSTETRICS & GYNECOLOGY

## 2021-04-08 PROCEDURE — 86592 SYPHILIS TEST NON-TREP QUAL: CPT | Performed by: OBSTETRICS & GYNECOLOGY

## 2021-04-08 PROCEDURE — 99999 PR PBB SHADOW E&M-EST. PATIENT-LVL II: CPT | Mod: PBBFAC,,, | Performed by: OBSTETRICS & GYNECOLOGY

## 2021-04-09 LAB
BACTERIAL VAGINOSIS DNA: NEGATIVE
CANDIDA GLABRATA DNA: NEGATIVE
CANDIDA KRUSEI DNA: NEGATIVE
CANDIDA RRNA VAG QL PROBE: NEGATIVE
HBV SURFACE AG SERPL QL IA: NEGATIVE
HCV AB SERPL QL IA: NEGATIVE
HIV 1+2 AB+HIV1 P24 AG SERPL QL IA: NEGATIVE
RPR SER QL: NORMAL
T VAGINALIS RRNA GENITAL QL PROBE: NEGATIVE

## 2021-08-27 ENCOUNTER — OFFICE VISIT (OUTPATIENT)
Dept: OBSTETRICS AND GYNECOLOGY | Facility: CLINIC | Age: 36
End: 2021-08-27
Payer: COMMERCIAL

## 2021-08-27 VITALS — BODY MASS INDEX: 26.9 KG/M2 | HEIGHT: 62 IN | WEIGHT: 146.19 LBS

## 2021-08-27 DIAGNOSIS — B37.31 VULVOVAGINITIS DUE TO CANDIDA: ICD-10-CM

## 2021-08-27 DIAGNOSIS — N89.8 VAGINA ITCHING: Primary | ICD-10-CM

## 2021-08-27 PROCEDURE — 1160F RVW MEDS BY RX/DR IN RCRD: CPT | Mod: CPTII,S$GLB,, | Performed by: PHYSICIAN ASSISTANT

## 2021-08-27 PROCEDURE — 1126F AMNT PAIN NOTED NONE PRSNT: CPT | Mod: CPTII,S$GLB,, | Performed by: PHYSICIAN ASSISTANT

## 2021-08-27 PROCEDURE — 1160F PR REVIEW ALL MEDS BY PRESCRIBER/CLIN PHARMACIST DOCUMENTED: ICD-10-PCS | Mod: CPTII,S$GLB,, | Performed by: PHYSICIAN ASSISTANT

## 2021-08-27 PROCEDURE — 1126F PR PAIN SEVERITY QUANTIFIED, NO PAIN PRESENT: ICD-10-PCS | Mod: CPTII,S$GLB,, | Performed by: PHYSICIAN ASSISTANT

## 2021-08-27 PROCEDURE — 1159F PR MEDICATION LIST DOCUMENTED IN MEDICAL RECORD: ICD-10-PCS | Mod: CPTII,S$GLB,, | Performed by: PHYSICIAN ASSISTANT

## 2021-08-27 PROCEDURE — 3008F BODY MASS INDEX DOCD: CPT | Mod: CPTII,S$GLB,, | Performed by: PHYSICIAN ASSISTANT

## 2021-08-27 PROCEDURE — 3008F PR BODY MASS INDEX (BMI) DOCUMENTED: ICD-10-PCS | Mod: CPTII,S$GLB,, | Performed by: PHYSICIAN ASSISTANT

## 2021-08-27 PROCEDURE — 87481 CANDIDA DNA AMP PROBE: CPT | Mod: 59 | Performed by: PHYSICIAN ASSISTANT

## 2021-08-27 PROCEDURE — 99213 OFFICE O/P EST LOW 20 MIN: CPT | Mod: S$GLB,,, | Performed by: PHYSICIAN ASSISTANT

## 2021-08-27 PROCEDURE — 99213 PR OFFICE/OUTPT VISIT, EST, LEVL III, 20-29 MIN: ICD-10-PCS | Mod: S$GLB,,, | Performed by: PHYSICIAN ASSISTANT

## 2021-08-27 PROCEDURE — 1159F MED LIST DOCD IN RCRD: CPT | Mod: CPTII,S$GLB,, | Performed by: PHYSICIAN ASSISTANT

## 2021-08-27 RX ORDER — FLUCONAZOLE 150 MG/1
150 TABLET ORAL DAILY
Qty: 1 TABLET | Refills: 0 | Status: SHIPPED | OUTPATIENT
Start: 2021-08-27 | End: 2021-08-28

## 2021-08-27 RX ORDER — CLOTRIMAZOLE AND BETAMETHASONE DIPROPIONATE 10; .64 MG/G; MG/G
CREAM TOPICAL
Qty: 15 G | Refills: 0 | Status: SHIPPED | OUTPATIENT
Start: 2021-08-27 | End: 2021-09-17 | Stop reason: SDUPTHER

## 2021-09-01 LAB
BACTERIAL VAGINOSIS DNA: NEGATIVE
CANDIDA GLABRATA DNA: NEGATIVE
CANDIDA KRUSEI DNA: NEGATIVE
CANDIDA RRNA VAG QL PROBE: NEGATIVE
T VAGINALIS RRNA GENITAL QL PROBE: NEGATIVE

## 2021-09-17 ENCOUNTER — OFFICE VISIT (OUTPATIENT)
Dept: OBSTETRICS AND GYNECOLOGY | Facility: CLINIC | Age: 36
End: 2021-09-17
Payer: COMMERCIAL

## 2021-09-17 VITALS
BODY MASS INDEX: 27.57 KG/M2 | HEIGHT: 62 IN | WEIGHT: 149.81 LBS | SYSTOLIC BLOOD PRESSURE: 106 MMHG | DIASTOLIC BLOOD PRESSURE: 70 MMHG

## 2021-09-17 DIAGNOSIS — N76.0 VULVOVAGINITIS: Primary | ICD-10-CM

## 2021-09-17 DIAGNOSIS — B37.31 VULVOVAGINITIS DUE TO CANDIDA: ICD-10-CM

## 2021-09-17 PROCEDURE — 3078F PR MOST RECENT DIASTOLIC BLOOD PRESSURE < 80 MM HG: ICD-10-PCS | Mod: CPTII,S$GLB,, | Performed by: PHYSICIAN ASSISTANT

## 2021-09-17 PROCEDURE — 87661 TRICHOMONAS VAGINALIS AMPLIF: CPT | Mod: 59 | Performed by: PHYSICIAN ASSISTANT

## 2021-09-17 PROCEDURE — 3074F SYST BP LT 130 MM HG: CPT | Mod: CPTII,S$GLB,, | Performed by: PHYSICIAN ASSISTANT

## 2021-09-17 PROCEDURE — 3008F BODY MASS INDEX DOCD: CPT | Mod: CPTII,S$GLB,, | Performed by: PHYSICIAN ASSISTANT

## 2021-09-17 PROCEDURE — 87481 CANDIDA DNA AMP PROBE: CPT | Mod: 59 | Performed by: PHYSICIAN ASSISTANT

## 2021-09-17 PROCEDURE — 3074F PR MOST RECENT SYSTOLIC BLOOD PRESSURE < 130 MM HG: ICD-10-PCS | Mod: CPTII,S$GLB,, | Performed by: PHYSICIAN ASSISTANT

## 2021-09-17 PROCEDURE — 3078F DIAST BP <80 MM HG: CPT | Mod: CPTII,S$GLB,, | Performed by: PHYSICIAN ASSISTANT

## 2021-09-17 PROCEDURE — 3008F PR BODY MASS INDEX (BMI) DOCUMENTED: ICD-10-PCS | Mod: CPTII,S$GLB,, | Performed by: PHYSICIAN ASSISTANT

## 2021-09-17 PROCEDURE — 1160F PR REVIEW ALL MEDS BY PRESCRIBER/CLIN PHARMACIST DOCUMENTED: ICD-10-PCS | Mod: CPTII,S$GLB,, | Performed by: PHYSICIAN ASSISTANT

## 2021-09-17 PROCEDURE — 99213 OFFICE O/P EST LOW 20 MIN: CPT | Mod: S$GLB,,, | Performed by: PHYSICIAN ASSISTANT

## 2021-09-17 PROCEDURE — 99213 PR OFFICE/OUTPT VISIT, EST, LEVL III, 20-29 MIN: ICD-10-PCS | Mod: S$GLB,,, | Performed by: PHYSICIAN ASSISTANT

## 2021-09-17 PROCEDURE — 1159F PR MEDICATION LIST DOCUMENTED IN MEDICAL RECORD: ICD-10-PCS | Mod: CPTII,S$GLB,, | Performed by: PHYSICIAN ASSISTANT

## 2021-09-17 PROCEDURE — 1159F MED LIST DOCD IN RCRD: CPT | Mod: CPTII,S$GLB,, | Performed by: PHYSICIAN ASSISTANT

## 2021-09-17 PROCEDURE — 1160F RVW MEDS BY RX/DR IN RCRD: CPT | Mod: CPTII,S$GLB,, | Performed by: PHYSICIAN ASSISTANT

## 2021-09-17 RX ORDER — CLOTRIMAZOLE AND BETAMETHASONE DIPROPIONATE 10; .64 MG/G; MG/G
CREAM TOPICAL
Qty: 45 G | Refills: 0 | Status: SHIPPED | OUTPATIENT
Start: 2021-09-17 | End: 2021-10-21

## 2021-09-22 ENCOUNTER — PATIENT MESSAGE (OUTPATIENT)
Dept: OBSTETRICS AND GYNECOLOGY | Facility: CLINIC | Age: 36
End: 2021-09-22

## 2021-09-22 RX ORDER — FLUCONAZOLE 150 MG/1
150 TABLET ORAL DAILY
Qty: 1 TABLET | Refills: 0 | Status: SHIPPED | OUTPATIENT
Start: 2021-09-22 | End: 2021-09-23

## 2021-10-21 ENCOUNTER — OFFICE VISIT (OUTPATIENT)
Dept: PRIMARY CARE CLINIC | Facility: CLINIC | Age: 36
End: 2021-10-21
Payer: COMMERCIAL

## 2021-10-21 ENCOUNTER — LAB VISIT (OUTPATIENT)
Dept: PRIMARY CARE CLINIC | Facility: CLINIC | Age: 36
End: 2021-10-21
Payer: COMMERCIAL

## 2021-10-21 VITALS
SYSTOLIC BLOOD PRESSURE: 124 MMHG | DIASTOLIC BLOOD PRESSURE: 82 MMHG | OXYGEN SATURATION: 99 % | HEART RATE: 90 BPM | BODY MASS INDEX: 28.39 KG/M2 | HEIGHT: 62 IN | WEIGHT: 154.31 LBS

## 2021-10-21 DIAGNOSIS — Z83.3 FAMILY HISTORY OF DIABETES MELLITUS: Primary | ICD-10-CM

## 2021-10-21 DIAGNOSIS — Z13.6 ENCOUNTER FOR LIPID SCREENING FOR CARDIOVASCULAR DISEASE: ICD-10-CM

## 2021-10-21 DIAGNOSIS — Z13.220 ENCOUNTER FOR LIPID SCREENING FOR CARDIOVASCULAR DISEASE: ICD-10-CM

## 2021-10-21 DIAGNOSIS — Z83.3 FAMILY HISTORY OF DIABETES MELLITUS: ICD-10-CM

## 2021-10-21 DIAGNOSIS — Z00.00 ANNUAL PHYSICAL EXAM: ICD-10-CM

## 2021-10-21 DIAGNOSIS — R45.86 MOOD CHANGES: ICD-10-CM

## 2021-10-21 LAB
CHOLEST SERPL-MCNC: 156 MG/DL (ref 120–199)
CHOLEST/HDLC SERPL: 2.1 {RATIO} (ref 2–5)
ESTIMATED AVG GLUCOSE: 105 MG/DL (ref 68–131)
HBA1C MFR BLD: 5.3 % (ref 4–5.6)
HDLC SERPL-MCNC: 73 MG/DL (ref 40–75)
HDLC SERPL: 46.8 % (ref 20–50)
LDLC SERPL CALC-MCNC: 73.6 MG/DL (ref 63–159)
NONHDLC SERPL-MCNC: 83 MG/DL
TRIGL SERPL-MCNC: 47 MG/DL (ref 30–150)

## 2021-10-21 PROCEDURE — 1160F PR REVIEW ALL MEDS BY PRESCRIBER/CLIN PHARMACIST DOCUMENTED: ICD-10-PCS | Mod: CPTII,S$GLB,, | Performed by: FAMILY MEDICINE

## 2021-10-21 PROCEDURE — 80061 LIPID PANEL: CPT | Performed by: FAMILY MEDICINE

## 2021-10-21 PROCEDURE — 3079F PR MOST RECENT DIASTOLIC BLOOD PRESSURE 80-89 MM HG: ICD-10-PCS | Mod: CPTII,S$GLB,, | Performed by: FAMILY MEDICINE

## 2021-10-21 PROCEDURE — 1160F RVW MEDS BY RX/DR IN RCRD: CPT | Mod: CPTII,S$GLB,, | Performed by: FAMILY MEDICINE

## 2021-10-21 PROCEDURE — 3074F SYST BP LT 130 MM HG: CPT | Mod: CPTII,S$GLB,, | Performed by: FAMILY MEDICINE

## 2021-10-21 PROCEDURE — 1159F MED LIST DOCD IN RCRD: CPT | Mod: CPTII,S$GLB,, | Performed by: FAMILY MEDICINE

## 2021-10-21 PROCEDURE — 99385 PREV VISIT NEW AGE 18-39: CPT | Mod: S$GLB,,, | Performed by: FAMILY MEDICINE

## 2021-10-21 PROCEDURE — 3079F DIAST BP 80-89 MM HG: CPT | Mod: CPTII,S$GLB,, | Performed by: FAMILY MEDICINE

## 2021-10-21 PROCEDURE — 36415 PR COLLECTION VENOUS BLOOD,VENIPUNCTURE: ICD-10-PCS | Mod: S$GLB,,, | Performed by: FAMILY MEDICINE

## 2021-10-21 PROCEDURE — 99385 PR PREVENTIVE VISIT,NEW,18-39: ICD-10-PCS | Mod: S$GLB,,, | Performed by: FAMILY MEDICINE

## 2021-10-21 PROCEDURE — 99999 PR PBB SHADOW E&M-EST. PATIENT-LVL III: ICD-10-PCS | Mod: PBBFAC,,, | Performed by: FAMILY MEDICINE

## 2021-10-21 PROCEDURE — 3008F PR BODY MASS INDEX (BMI) DOCUMENTED: ICD-10-PCS | Mod: CPTII,S$GLB,, | Performed by: FAMILY MEDICINE

## 2021-10-21 PROCEDURE — 36415 COLL VENOUS BLD VENIPUNCTURE: CPT | Mod: S$GLB,,, | Performed by: FAMILY MEDICINE

## 2021-10-21 PROCEDURE — 3008F BODY MASS INDEX DOCD: CPT | Mod: CPTII,S$GLB,, | Performed by: FAMILY MEDICINE

## 2021-10-21 PROCEDURE — 83036 HEMOGLOBIN GLYCOSYLATED A1C: CPT | Performed by: FAMILY MEDICINE

## 2021-10-21 PROCEDURE — 3074F PR MOST RECENT SYSTOLIC BLOOD PRESSURE < 130 MM HG: ICD-10-PCS | Mod: CPTII,S$GLB,, | Performed by: FAMILY MEDICINE

## 2021-10-21 PROCEDURE — 1159F PR MEDICATION LIST DOCUMENTED IN MEDICAL RECORD: ICD-10-PCS | Mod: CPTII,S$GLB,, | Performed by: FAMILY MEDICINE

## 2021-10-21 PROCEDURE — 99999 PR PBB SHADOW E&M-EST. PATIENT-LVL III: CPT | Mod: PBBFAC,,, | Performed by: FAMILY MEDICINE

## 2021-12-23 ENCOUNTER — OFFICE VISIT (OUTPATIENT)
Dept: URGENT CARE | Facility: CLINIC | Age: 36
End: 2021-12-23
Payer: COMMERCIAL

## 2021-12-23 VITALS
WEIGHT: 155 LBS | HEART RATE: 81 BPM | HEIGHT: 62 IN | RESPIRATION RATE: 16 BRPM | BODY MASS INDEX: 28.52 KG/M2 | DIASTOLIC BLOOD PRESSURE: 76 MMHG | OXYGEN SATURATION: 98 % | TEMPERATURE: 98 F | SYSTOLIC BLOOD PRESSURE: 117 MMHG

## 2021-12-23 DIAGNOSIS — H66.003 ACUTE SUPPURATIVE OTITIS MEDIA OF BOTH EARS WITHOUT SPONTANEOUS RUPTURE OF TYMPANIC MEMBRANES, RECURRENCE NOT SPECIFIED: Primary | ICD-10-CM

## 2021-12-23 LAB
CTP QC/QA: YES
SARS-COV-2 RDRP RESP QL NAA+PROBE: NEGATIVE

## 2021-12-23 PROCEDURE — U0002: ICD-10-PCS | Mod: QW,S$GLB,,

## 2021-12-23 PROCEDURE — 99203 OFFICE O/P NEW LOW 30 MIN: CPT | Mod: S$GLB,CS,,

## 2021-12-23 PROCEDURE — 99203 PR OFFICE/OUTPT VISIT, NEW, LEVL III, 30-44 MIN: ICD-10-PCS | Mod: S$GLB,CS,,

## 2021-12-23 PROCEDURE — U0002 COVID-19 LAB TEST NON-CDC: HCPCS | Mod: QW,S$GLB,,

## 2021-12-23 RX ORDER — CEFDINIR 300 MG/1
300 CAPSULE ORAL 2 TIMES DAILY
Qty: 14 CAPSULE | Refills: 0 | Status: SHIPPED | OUTPATIENT
Start: 2021-12-23 | End: 2021-12-30

## 2022-09-20 ENCOUNTER — PATIENT MESSAGE (OUTPATIENT)
Dept: PRIMARY CARE CLINIC | Facility: CLINIC | Age: 37
End: 2022-09-20
Payer: COMMERCIAL

## 2022-09-20 ENCOUNTER — PATIENT MESSAGE (OUTPATIENT)
Dept: OBSTETRICS AND GYNECOLOGY | Facility: CLINIC | Age: 37
End: 2022-09-20
Payer: COMMERCIAL

## 2022-09-26 ENCOUNTER — LAB VISIT (OUTPATIENT)
Dept: PRIMARY CARE CLINIC | Facility: CLINIC | Age: 37
End: 2022-09-26
Payer: COMMERCIAL

## 2022-09-26 ENCOUNTER — OFFICE VISIT (OUTPATIENT)
Dept: PRIMARY CARE CLINIC | Facility: CLINIC | Age: 37
End: 2022-09-26
Payer: COMMERCIAL

## 2022-09-26 VITALS
OXYGEN SATURATION: 98 % | BODY MASS INDEX: 29.25 KG/M2 | SYSTOLIC BLOOD PRESSURE: 100 MMHG | WEIGHT: 158.94 LBS | HEIGHT: 62 IN | DIASTOLIC BLOOD PRESSURE: 63 MMHG | HEART RATE: 80 BPM | TEMPERATURE: 98 F

## 2022-09-26 DIAGNOSIS — Z13.220 SCREENING CHOLESTEROL LEVEL: ICD-10-CM

## 2022-09-26 DIAGNOSIS — Z13.29 SCREENING FOR THYROID DISORDER: ICD-10-CM

## 2022-09-26 DIAGNOSIS — Z11.4 SCREENING FOR HIV (HUMAN IMMUNODEFICIENCY VIRUS): ICD-10-CM

## 2022-09-26 DIAGNOSIS — Z11.59 NEED FOR HEPATITIS C SCREENING TEST: ICD-10-CM

## 2022-09-26 DIAGNOSIS — Z13.1 SCREENING FOR DIABETES MELLITUS: ICD-10-CM

## 2022-09-26 DIAGNOSIS — Z11.3 SCREEN FOR STD (SEXUALLY TRANSMITTED DISEASE): ICD-10-CM

## 2022-09-26 DIAGNOSIS — F43.9 STRESS: Primary | ICD-10-CM

## 2022-09-26 DIAGNOSIS — Z00.00 PREVENTATIVE HEALTH CARE: ICD-10-CM

## 2022-09-26 LAB
ALBUMIN SERPL BCP-MCNC: 4.2 G/DL (ref 3.5–5.2)
ALP SERPL-CCNC: 55 U/L (ref 55–135)
ALT SERPL W/O P-5'-P-CCNC: 16 U/L (ref 10–44)
ANION GAP SERPL CALC-SCNC: 8 MMOL/L (ref 8–16)
AST SERPL-CCNC: 18 U/L (ref 10–40)
BASOPHILS # BLD AUTO: 0.03 K/UL (ref 0–0.2)
BASOPHILS NFR BLD: 0.5 % (ref 0–1.9)
BILIRUB SERPL-MCNC: 0.4 MG/DL (ref 0.1–1)
BUN SERPL-MCNC: 12 MG/DL (ref 6–20)
CALCIUM SERPL-MCNC: 9.5 MG/DL (ref 8.7–10.5)
CHLORIDE SERPL-SCNC: 104 MMOL/L (ref 95–110)
CHOLEST SERPL-MCNC: 134 MG/DL (ref 120–199)
CHOLEST/HDLC SERPL: 2.1 {RATIO} (ref 2–5)
CO2 SERPL-SCNC: 25 MMOL/L (ref 23–29)
CREAT SERPL-MCNC: 0.8 MG/DL (ref 0.5–1.4)
DIFFERENTIAL METHOD: ABNORMAL
EOSINOPHIL # BLD AUTO: 0 K/UL (ref 0–0.5)
EOSINOPHIL NFR BLD: 0.5 % (ref 0–8)
ERYTHROCYTE [DISTWIDTH] IN BLOOD BY AUTOMATED COUNT: 13 % (ref 11.5–14.5)
EST. GFR  (NO RACE VARIABLE): >60 ML/MIN/1.73 M^2
ESTIMATED AVG GLUCOSE: 103 MG/DL (ref 68–131)
GLUCOSE SERPL-MCNC: 77 MG/DL (ref 70–110)
HBA1C MFR BLD: 5.2 % (ref 4–5.6)
HCT VFR BLD AUTO: 34.8 % (ref 37–48.5)
HCV AB SERPL QL IA: NORMAL
HDLC SERPL-MCNC: 65 MG/DL (ref 40–75)
HDLC SERPL: 48.5 % (ref 20–50)
HGB BLD-MCNC: 11.8 G/DL (ref 12–16)
HIV 1+2 AB+HIV1 P24 AG SERPL QL IA: NORMAL
IMM GRANULOCYTES # BLD AUTO: 0.01 K/UL (ref 0–0.04)
IMM GRANULOCYTES NFR BLD AUTO: 0.2 % (ref 0–0.5)
LDLC SERPL CALC-MCNC: 53.8 MG/DL (ref 63–159)
LYMPHOCYTES # BLD AUTO: 1.5 K/UL (ref 1–4.8)
LYMPHOCYTES NFR BLD: 25.8 % (ref 18–48)
MCH RBC QN AUTO: 31.4 PG (ref 27–31)
MCHC RBC AUTO-ENTMCNC: 33.9 G/DL (ref 32–36)
MCV RBC AUTO: 93 FL (ref 82–98)
MONOCYTES # BLD AUTO: 0.4 K/UL (ref 0.3–1)
MONOCYTES NFR BLD: 7.2 % (ref 4–15)
NEUTROPHILS # BLD AUTO: 3.8 K/UL (ref 1.8–7.7)
NEUTROPHILS NFR BLD: 65.8 % (ref 38–73)
NONHDLC SERPL-MCNC: 69 MG/DL
NRBC BLD-RTO: 0 /100 WBC
PLATELET # BLD AUTO: 213 K/UL (ref 150–450)
PMV BLD AUTO: 10.7 FL (ref 9.2–12.9)
POTASSIUM SERPL-SCNC: 4.1 MMOL/L (ref 3.5–5.1)
PROT SERPL-MCNC: 7.2 G/DL (ref 6–8.4)
RBC # BLD AUTO: 3.76 M/UL (ref 4–5.4)
SODIUM SERPL-SCNC: 137 MMOL/L (ref 136–145)
T4 FREE SERPL-MCNC: 0.91 NG/DL (ref 0.71–1.51)
TRIGL SERPL-MCNC: 76 MG/DL (ref 30–150)
TSH SERPL DL<=0.005 MIU/L-ACNC: 0.53 UIU/ML (ref 0.4–4)
WBC # BLD AUTO: 5.81 K/UL (ref 3.9–12.7)

## 2022-09-26 PROCEDURE — 99213 OFFICE O/P EST LOW 20 MIN: CPT | Mod: S$GLB,,, | Performed by: NURSE PRACTITIONER

## 2022-09-26 PROCEDURE — 85025 COMPLETE CBC W/AUTO DIFF WBC: CPT | Performed by: NURSE PRACTITIONER

## 2022-09-26 PROCEDURE — 99213 PR OFFICE/OUTPT VISIT, EST, LEVL III, 20-29 MIN: ICD-10-PCS | Mod: S$GLB,,, | Performed by: NURSE PRACTITIONER

## 2022-09-26 PROCEDURE — 84443 ASSAY THYROID STIM HORMONE: CPT | Performed by: NURSE PRACTITIONER

## 2022-09-26 PROCEDURE — 86803 HEPATITIS C AB TEST: CPT | Performed by: NURSE PRACTITIONER

## 2022-09-26 PROCEDURE — 99999 PR PBB SHADOW E&M-EST. PATIENT-LVL IV: ICD-10-PCS | Mod: PBBFAC,,, | Performed by: NURSE PRACTITIONER

## 2022-09-26 PROCEDURE — 99999 PR PBB SHADOW E&M-EST. PATIENT-LVL IV: CPT | Mod: PBBFAC,,, | Performed by: NURSE PRACTITIONER

## 2022-09-26 PROCEDURE — 80061 LIPID PANEL: CPT | Performed by: NURSE PRACTITIONER

## 2022-09-26 PROCEDURE — 86592 SYPHILIS TEST NON-TREP QUAL: CPT | Performed by: NURSE PRACTITIONER

## 2022-09-26 PROCEDURE — 83036 HEMOGLOBIN GLYCOSYLATED A1C: CPT | Performed by: NURSE PRACTITIONER

## 2022-09-26 PROCEDURE — 84439 ASSAY OF FREE THYROXINE: CPT | Performed by: NURSE PRACTITIONER

## 2022-09-26 PROCEDURE — 80053 COMPREHEN METABOLIC PANEL: CPT | Performed by: NURSE PRACTITIONER

## 2022-09-26 PROCEDURE — 87389 HIV-1 AG W/HIV-1&-2 AB AG IA: CPT | Performed by: NURSE PRACTITIONER

## 2022-09-26 RX ORDER — GLYCOPYRRONIUM 2.4 G/100G
CLOTH TOPICAL
COMMUNITY
Start: 2022-09-07 | End: 2022-09-26

## 2022-09-26 RX ORDER — KETOCONAZOLE 20 MG/G
CREAM TOPICAL
COMMUNITY
Start: 2022-09-07 | End: 2022-09-26

## 2022-09-26 NOTE — PROGRESS NOTES
"Subjective:       Patient ID: Carmina Hernandez is a 36 y.o. female.    Chief Complaint: Annual Exam    Ms. Carmina Hernandez is a 36 year old female, new to me, presents to the clinic for preventative care concerns. PCP is Hadley Araujo. Medical and surgical history in addition to problem list reviewed as listed below.    Presents with preventative care concerns. Requesting referral to psychology/psychiatry, " I need someone to talk to, my  says I am too hard on myself." No acute concerns.      History reviewed. No pertinent past medical history.     Past Surgical History:   Procedure Laterality Date    KELOID EXCISION      chest        Family History   Problem Relation Age of Onset    Dementia Mother     Hypertension Father     No Known Problems Daughter     Cancer Neg Hx     Colon cancer Neg Hx     Diabetes Neg Hx     Eclampsia Neg Hx     Miscarriages / Stillbirths Neg Hx     Ovarian cancer Neg Hx      labor Neg Hx     Stroke Neg Hx        Social History     Tobacco Use   Smoking Status Never   Smokeless Tobacco Never       Social History     Social History Narrative    Not on file       Review of patient's allergies indicates:   Allergen Reactions    Sulfa (sulfonamide antibiotics)         Review of Systems   Constitutional: Negative.    HENT: Negative.     Eyes: Negative.    Respiratory: Negative.     Cardiovascular: Negative.    Gastrointestinal: Negative.    Genitourinary: Negative.    Skin: Negative.    Neurological: Negative.    Psychiatric/Behavioral: Negative.         Objective:        Vitals:    22 1052   BP: 100/63   Pulse: 80   Temp: 98.4 °F (36.9 °C)        Physical Exam  Constitutional:       General: She is not in acute distress.     Appearance: She is well-developed.   HENT:      Head: Normocephalic and atraumatic.      Right Ear: External ear normal.      Left Ear: External ear normal.   Eyes:      General: No scleral icterus.     Extraocular Movements: Extraocular movements " intact.      Conjunctiva/sclera: Conjunctivae normal.   Cardiovascular:      Rate and Rhythm: Normal rate and regular rhythm.      Heart sounds: Normal heart sounds. No murmur heard.    No friction rub. No gallop.   Pulmonary:      Effort: Pulmonary effort is normal.      Breath sounds: Normal breath sounds. No wheezing or rales.   Musculoskeletal:         General: Normal range of motion.      Cervical back: Normal range of motion and neck supple.   Lymphadenopathy:      Cervical: No cervical adenopathy.   Skin:     General: Skin is warm and dry.      Findings: No erythema or rash.   Neurological:      Mental Status: She is alert and oriented to person, place, and time.      Cranial Nerves: No cranial nerve deficit.   Psychiatric:         Mood and Affect: Mood normal.         Behavior: Behavior normal.       Assessment:       1. Preventative health care    2. Screening cholesterol level    3. Screening for thyroid disorder    4. Screening for diabetes mellitus    5. Need for hepatitis C screening test    6. Screen for STD (sexually transmitted disease)    7. Screening for HIV (human immunodeficiency virus)    8. Stress        Plan:       Preventative health care  -     CBC Auto Differential; Future; Expected date: 09/26/2022  -     Comprehensive Metabolic Panel; Future; Expected date: 09/26/2022    Screening cholesterol level  -     Lipid Panel; Future; Expected date: 09/26/2022    Screening for thyroid disorder  -     TSH; Future; Expected date: 09/26/2022  -     T4, Free; Future; Expected date: 09/26/2022    Screening for diabetes mellitus  -     Hemoglobin A1C; Future; Expected date: 09/26/2022    Need for hepatitis C screening test  -     Hepatitis C Antibody; Future; Expected date: 09/26/2022    Screen for STD (sexually transmitted disease)  -     RPR; Future; Expected date: 09/26/2022    Screening for HIV (human immunodeficiency virus)  -     HIV 1/2 Ag/Ab (4th Gen); Future; Expected date:  2022    Stress  Meditation/Journal.  Identify coping mechanisms.  Explore possible stressors and lifestyle changes.  Pharmacological therapy PRN.   -     Ambulatory referral/consult to Psychiatry; Future; Expected date: 2022     Medication List with Changes/Refills   Discontinued Medications    KETOCONAZOLE (NIZORAL) 2 % CREAM    SMARTSI Topical Daily PRN    QBREXZA 2.4 % TOWL    Apply topically.            Follow up if symptoms worsen or fail to improve, for Dr. Araujo annual exam.    Adriana Esparza APRN, MSN, FNP-C

## 2022-09-27 LAB — RPR SER QL: NORMAL

## 2022-10-04 ENCOUNTER — OFFICE VISIT (OUTPATIENT)
Dept: OBSTETRICS AND GYNECOLOGY | Facility: CLINIC | Age: 37
End: 2022-10-04
Payer: COMMERCIAL

## 2022-10-04 ENCOUNTER — TELEPHONE (OUTPATIENT)
Dept: OBSTETRICS AND GYNECOLOGY | Facility: CLINIC | Age: 37
End: 2022-10-04

## 2022-10-04 VITALS
HEIGHT: 62 IN | BODY MASS INDEX: 29.72 KG/M2 | SYSTOLIC BLOOD PRESSURE: 118 MMHG | DIASTOLIC BLOOD PRESSURE: 72 MMHG | WEIGHT: 161.5 LBS

## 2022-10-04 DIAGNOSIS — Z12.31 BREAST CANCER SCREENING BY MAMMOGRAM: Primary | ICD-10-CM

## 2022-10-04 DIAGNOSIS — N92.0 MENORRHAGIA WITH REGULAR CYCLE: ICD-10-CM

## 2022-10-04 DIAGNOSIS — N63.41 SUBAREOLAR MASS OF RIGHT BREAST: Primary | ICD-10-CM

## 2022-10-04 PROCEDURE — 99213 PR OFFICE/OUTPT VISIT, EST, LEVL III, 20-29 MIN: ICD-10-PCS | Mod: S$GLB,,, | Performed by: STUDENT IN AN ORGANIZED HEALTH CARE EDUCATION/TRAINING PROGRAM

## 2022-10-04 PROCEDURE — 99999 PR PBB SHADOW E&M-EST. PATIENT-LVL II: ICD-10-PCS | Mod: PBBFAC,,, | Performed by: STUDENT IN AN ORGANIZED HEALTH CARE EDUCATION/TRAINING PROGRAM

## 2022-10-04 PROCEDURE — 99212 OFFICE O/P EST SF 10 MIN: CPT | Mod: PBBFAC | Performed by: STUDENT IN AN ORGANIZED HEALTH CARE EDUCATION/TRAINING PROGRAM

## 2022-10-04 PROCEDURE — 99999 PR PBB SHADOW E&M-EST. PATIENT-LVL II: CPT | Mod: PBBFAC,,, | Performed by: STUDENT IN AN ORGANIZED HEALTH CARE EDUCATION/TRAINING PROGRAM

## 2022-10-04 PROCEDURE — 99213 OFFICE O/P EST LOW 20 MIN: CPT | Mod: S$GLB,,, | Performed by: STUDENT IN AN ORGANIZED HEALTH CARE EDUCATION/TRAINING PROGRAM

## 2022-10-04 RX ORDER — NORETHINDRONE ACETATE AND ETHINYL ESTRADIOL, ETHINYL ESTRADIOL AND FERROUS FUMARATE 1MG-10(24)
1 KIT ORAL DAILY
Qty: 84 TABLET | Refills: 0 | Status: SHIPPED | OUTPATIENT
Start: 2022-10-04 | End: 2022-10-25 | Stop reason: SDUPTHER

## 2022-10-04 NOTE — PROGRESS NOTES
"  Chief Complaint: Breast lump     HPI:      Carmina Hernandez is a 36 y.o.  who presents complaining of right breast lump. Noticed when she was lying down and had some chest soreness, then felt lump in right breast. Denies any nipple discharge. Family history consists of maternal aunt with breast cancer in 40s. Patient has regular monthly menses. Reports they are heavy and uncomfortable, discussed OCPs and she desires to try them. LMP beginning of September.      ROS:     GENERAL: Denies unintentional weight gain or weight loss. Feeling well overall.   MUSCULOSKEL: Denies pain in back or extremities.    Physical Exam:      PHYSICAL EXAM:  /72   Ht 5' 2" (1.575 m)   Wt 73.2 kg (161 lb 7.8 oz)   BMI 29.54 kg/m²   Body mass index is 29.54 kg/m².    APPEARANCE: Well nourished, well developed, in no acute distress.  BREASTS: Right: normal in size and symmetry, normal contour with no evidence of flattening or dimpling, skin normal, small area of dense tissue subareolar at top of nipple edge, no true masses palpated, no palpable lymphadenopathy. Left: normal in size and symmetry, normal contour with no evidence of flattening or dimpling, skin normal, palpation negative for masses or nodules, no palpable axillary lymphadenopathy  ABDOMEN: Soft.  No tenderness or masses.      Assessment/Plan:     Subareolar mass of right breast  -     US Breast Bilateral Complete; Future; Expected date: 10/04/2022    - breast exam benign on my exam, will order breast US to further assess    Menorrhagia with regular cycle  -     norethindrone-e.estradioL-iron (LO LOESTRIN FE) 1 mg-10 mcg (24)/10 mcg (2) Tab; Take 1 tablet by mouth once daily.  Dispense: 84 tablet; Refill: 0     - trial of OCPs x 3 mos for heavy cycles, denies contraindications including migraines, smoking or h/o blood clots    Conchis Ma MD  Obstetrics and Gynecology  [facility]    "

## 2022-10-06 ENCOUNTER — PATIENT MESSAGE (OUTPATIENT)
Dept: PRIMARY CARE CLINIC | Facility: CLINIC | Age: 37
End: 2022-10-06
Payer: COMMERCIAL

## 2022-10-17 ENCOUNTER — APPOINTMENT (OUTPATIENT)
Dept: RADIOLOGY | Facility: OTHER | Age: 37
End: 2022-10-17
Attending: STUDENT IN AN ORGANIZED HEALTH CARE EDUCATION/TRAINING PROGRAM
Payer: COMMERCIAL

## 2022-10-17 VITALS — WEIGHT: 161.38 LBS | BODY MASS INDEX: 29.7 KG/M2 | HEIGHT: 62 IN

## 2022-10-17 DIAGNOSIS — Z12.31 BREAST CANCER SCREENING BY MAMMOGRAM: ICD-10-CM

## 2022-10-17 PROCEDURE — 77063 BREAST TOMOSYNTHESIS BI: CPT | Mod: TC,PN

## 2022-10-17 PROCEDURE — 77067 MAMMO DIGITAL SCREENING BILAT WITH TOMO: ICD-10-PCS | Mod: 26,,, | Performed by: RADIOLOGY

## 2022-10-17 PROCEDURE — 77067 SCR MAMMO BI INCL CAD: CPT | Mod: TC,PN

## 2022-10-17 PROCEDURE — 77067 SCR MAMMO BI INCL CAD: CPT | Mod: 26,,, | Performed by: RADIOLOGY

## 2022-10-17 PROCEDURE — 77063 MAMMO DIGITAL SCREENING BILAT WITH TOMO: ICD-10-PCS | Mod: 26,,, | Performed by: RADIOLOGY

## 2022-10-17 PROCEDURE — 77063 BREAST TOMOSYNTHESIS BI: CPT | Mod: 26,,, | Performed by: RADIOLOGY

## 2022-10-20 ENCOUNTER — TELEPHONE (OUTPATIENT)
Dept: OPHTHALMOLOGY | Facility: CLINIC | Age: 37
End: 2022-10-20
Payer: COMMERCIAL

## 2022-10-20 ENCOUNTER — OFFICE VISIT (OUTPATIENT)
Dept: PRIMARY CARE CLINIC | Facility: CLINIC | Age: 37
End: 2022-10-20
Payer: COMMERCIAL

## 2022-10-20 ENCOUNTER — PATIENT MESSAGE (OUTPATIENT)
Dept: PRIMARY CARE CLINIC | Facility: CLINIC | Age: 37
End: 2022-10-20

## 2022-10-20 VITALS
TEMPERATURE: 98 F | DIASTOLIC BLOOD PRESSURE: 57 MMHG | WEIGHT: 158.75 LBS | BODY MASS INDEX: 29.21 KG/M2 | HEIGHT: 62 IN | SYSTOLIC BLOOD PRESSURE: 113 MMHG | HEART RATE: 80 BPM

## 2022-10-20 DIAGNOSIS — Z23 NEEDS FLU SHOT: ICD-10-CM

## 2022-10-20 DIAGNOSIS — H02.401 PTOSIS OF EYELID, RIGHT: Primary | ICD-10-CM

## 2022-10-20 PROCEDURE — 90471 IMMUNIZATION ADMIN: CPT | Mod: PBBFAC,PN

## 2022-10-20 PROCEDURE — 99999 PR PBB SHADOW E&M-EST. PATIENT-LVL III: CPT | Mod: PBBFAC,,, | Performed by: NURSE PRACTITIONER

## 2022-10-20 PROCEDURE — 99213 OFFICE O/P EST LOW 20 MIN: CPT | Mod: S$GLB,,, | Performed by: NURSE PRACTITIONER

## 2022-10-20 PROCEDURE — 99999 PR PBB SHADOW E&M-EST. PATIENT-LVL III: ICD-10-PCS | Mod: PBBFAC,,, | Performed by: NURSE PRACTITIONER

## 2022-10-20 PROCEDURE — 99213 OFFICE O/P EST LOW 20 MIN: CPT | Mod: PBBFAC,PN | Performed by: NURSE PRACTITIONER

## 2022-10-20 PROCEDURE — 99213 PR OFFICE/OUTPT VISIT, EST, LEVL III, 20-29 MIN: ICD-10-PCS | Mod: S$GLB,,, | Performed by: NURSE PRACTITIONER

## 2022-10-20 RX ORDER — KETOCONAZOLE 20 MG/G
CREAM TOPICAL
COMMUNITY
Start: 2022-10-04 | End: 2024-03-04

## 2022-10-20 NOTE — TELEPHONE ENCOUNTER
----- Message from Ember Aly MA sent at 10/20/2022  3:21 PM CDT -----  Regarding: FW: Scheduling    ----- Message -----  From: Lisa Alicea  Sent: 10/20/2022   3:12 PM CDT  To: [facility] Clinical Support Staff  Subject: Scheduling                                       Good afternoon,     An order has been placed for the patient can you assist with scheduling please? Thanks.       Ptosis of eyelid, right [H02.401]

## 2022-10-20 NOTE — PROGRESS NOTES
Flu injection given &.VIS given. Tolerated injection well.Patient instructed to wait 15 minutes for monitoring.

## 2022-10-20 NOTE — PROGRESS NOTES
Subjective:       Patient ID: Carmina Hernandez is a 36 y.o. female.    Chief Complaint: eyelid drooping     Ms. Carmina Hernandez is a 36 year old female, new to me, presents to the clinic with eyelid drooping. PCP is Hadley Araujo. Medical and surgical history in addition to problem list reviewed as listed below.    Patient  presents with complaints of right eyelid drooping for over an year. Denies pain/dryness of eye. Concerned about muscles and muscle strength in eyelid. No OTC regimen.      History reviewed. No pertinent past medical history.     Past Surgical History:   Procedure Laterality Date    KELOID EXCISION      chest        Family History   Problem Relation Age of Onset    Dementia Mother     Hypertension Father     No Known Problems Daughter     Breast cancer Maternal Aunt     Cancer Neg Hx     Colon cancer Neg Hx     Diabetes Neg Hx     Eclampsia Neg Hx     Miscarriages / Stillbirths Neg Hx     Ovarian cancer Neg Hx      labor Neg Hx     Stroke Neg Hx        Social History     Tobacco Use   Smoking Status Never   Smokeless Tobacco Never       Social History     Social History Narrative    Not on file       Review of patient's allergies indicates:   Allergen Reactions    Sulfa (sulfonamide antibiotics)         Review of Systems      Objective:        Vitals:    10/20/22 1345   BP: (!) 113/57   Pulse: 80   Temp: 98.1 °F (36.7 °C)        Physical Exam  Constitutional:       Appearance: Normal appearance.   Eyes:      Comments: Drooping of right eyelid.   Cardiovascular:      Rate and Rhythm: Normal rate.   Pulmonary:      Effort: Pulmonary effort is normal.   Neurological:      Mental Status: She is alert and oriented to person, place, and time.       Assessment:       1. Ptosis of eyelid, right    2. Needs flu shot        Plan:       Ptosis of eyelid, right  -     Ambulatory referral/consult to Ophthalmology; Future; Expected date: 10/20/2022    Needs flu shot  -     Influenza - Quadrivalent  *Preferred* (6 months+) (PF)     Medication List with Changes/Refills   Current Medications    KETOCONAZOLE (NIZORAL) 2 % CREAM    SMARTSI Topical Daily PRN   Changed and/or Refilled Medications    Modified Medication Previous Medication    LO LOESTRIN FE 1 MG-10 MCG (24)/10 MCG (2) TAB norethindrone-e.estradioL-iron (LO LOESTRIN FE) 1 mg-10 mcg (24)/10 mcg (2) Tab       Take 1 tablet by mouth once daily.    Take 1 tablet by mouth once daily.    NORETHINDRONE-E.ESTRADIOL-IRON (LO LOESTRIN FE) 1 MG-10 MCG (24)/10 MCG (2) TAB norethindrone-e.estradioL-iron (LO LOESTRIN FE) 1 mg-10 mcg (24)/10 mcg (2) Tab       Take 1 tablet by mouth once daily.    Take 1 tablet by mouth once daily.            Follow up if symptoms worsen or fail to improve.    MARIN Mclean, MSN, FNP-C

## 2022-10-25 ENCOUNTER — OFFICE VISIT (OUTPATIENT)
Dept: PSYCHIATRY | Facility: CLINIC | Age: 37
End: 2022-10-25
Payer: COMMERCIAL

## 2022-10-25 DIAGNOSIS — F43.23 ADJUSTMENT DISORDER WITH MIXED ANXIETY AND DEPRESSED MOOD: Primary | ICD-10-CM

## 2022-10-25 DIAGNOSIS — N92.0 MENORRHAGIA WITH REGULAR CYCLE: ICD-10-CM

## 2022-10-25 PROCEDURE — 90791 PR PSYCHIATRIC DIAGNOSTIC EVALUATION: ICD-10-PCS | Mod: S$GLB,,, | Performed by: PSYCHOLOGIST

## 2022-10-25 PROCEDURE — 90791 PSYCH DIAGNOSTIC EVALUATION: CPT | Mod: S$GLB,,, | Performed by: PSYCHOLOGIST

## 2022-10-25 RX ORDER — NORETHINDRONE ACETATE AND ETHINYL ESTRADIOL, ETHINYL ESTRADIOL AND FERROUS FUMARATE 1MG-10(24)
1 KIT ORAL DAILY
Qty: 84 TABLET | Refills: 0 | Status: SHIPPED | OUTPATIENT
Start: 2022-10-25 | End: 2023-01-23
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
## 2022-11-14 ENCOUNTER — PATIENT MESSAGE (OUTPATIENT)
Dept: PSYCHIATRY | Facility: CLINIC | Age: 37
End: 2022-11-14
Payer: COMMERCIAL

## 2022-11-15 ENCOUNTER — OFFICE VISIT (OUTPATIENT)
Dept: PSYCHIATRY | Facility: CLINIC | Age: 37
End: 2022-11-15
Payer: COMMERCIAL

## 2022-11-15 ENCOUNTER — PATIENT MESSAGE (OUTPATIENT)
Dept: PSYCHIATRY | Facility: CLINIC | Age: 37
End: 2022-11-15
Payer: COMMERCIAL

## 2022-11-15 DIAGNOSIS — F43.23 ADJUSTMENT DISORDER WITH MIXED ANXIETY AND DEPRESSED MOOD: ICD-10-CM

## 2022-11-15 DIAGNOSIS — F45.22 BODY DYSMORPHIC DISORDER: Primary | ICD-10-CM

## 2022-11-15 PROCEDURE — 99999 PR PBB SHADOW E&M-EST. PATIENT-LVL I: ICD-10-PCS | Mod: PBBFAC,,, | Performed by: PSYCHOLOGIST

## 2022-11-15 PROCEDURE — 90834 PR PSYCHOTHERAPY W/PATIENT, 45 MIN: ICD-10-PCS | Mod: S$GLB,,, | Performed by: PSYCHOLOGIST

## 2022-11-15 PROCEDURE — 90834 PSYTX W PT 45 MINUTES: CPT | Mod: S$GLB,,, | Performed by: PSYCHOLOGIST

## 2022-11-15 PROCEDURE — 99999 PR PBB SHADOW E&M-EST. PATIENT-LVL I: CPT | Mod: PBBFAC,,, | Performed by: PSYCHOLOGIST

## 2022-11-15 NOTE — PROGRESS NOTES
Individual Psychotherapy (PhD/LCSW)    11/15/2022    Site:  [facility]         Therapeutic Intervention: Met with patient.  Outpatient - Behavior modifying psychotherapy 45 min - CPT code 68398    Chief complaint/reason for encounter: depression and anxiety     Interval history and content of current session: Patient presented casually dressed, alert, and oriented. Patient reported experiencing diminished interest, insomnia, fatigue, worthlessness/guilt, poor concentration, decreased libido, social isolation, irritability and muscle tension (vaginal wall).  Patient denied current suicidal and homicidal ideations.  Explored source of patient's anxiety.  Active listening skills were used as patient described long history of disordered eating since age [age] years old when she was overweight as a child.  Patient's thoughts, feelings, and behavior were explored for classic signs of body dysmorphic disorder including excessive grooming, restricted diet, and comparing her body to that of others.  Assisted patient in processing her feelings around her thoughts about her body and how it affects her daily life activities including her tendency to over exercise.      Treatment plan:  Target symptoms: depression, anxiety , adjustment  Why chosen therapy is appropriate versus another modality: relevant to diagnosis  Outcome monitoring methods: self-report  Therapeutic intervention type: insight oriented psychotherapy, behavior modifying psychotherapy    Risk parameters:  Patient reports no suicidal ideation  Patient reports no homicidal ideation  Patient reports no self-injurious behavior  Patient reports no violent behavior    Verbal deficits: None    Patient's response to intervention:  The patient's response to intervention is accepting.    Progress toward goals and other mental status changes:  The patient's progress toward goals is fair .    Diagnosis:     ICD-10-CM ICD-9-CM   1. Body dysmorphic disorder  F45.22 300.7    2. Adjustment disorder with mixed anxiety and depressed mood  F43.23 309.28       Plan:  individual psychotherapy    Return to clinic: 1 month    Length of Service (minutes): 45

## 2022-12-06 PROBLEM — H02.401 PTOSIS OF EYELID, RIGHT: Status: ACTIVE | Noted: 2022-12-06

## 2022-12-08 ENCOUNTER — OFFICE VISIT (OUTPATIENT)
Dept: PSYCHIATRY | Facility: CLINIC | Age: 37
End: 2022-12-08
Payer: MEDICAID

## 2022-12-08 DIAGNOSIS — F43.23 ADJUSTMENT DISORDER WITH MIXED ANXIETY AND DEPRESSED MOOD: ICD-10-CM

## 2022-12-08 DIAGNOSIS — F45.22 BODY DYSMORPHIC DISORDER: Primary | ICD-10-CM

## 2022-12-08 PROCEDURE — 90837 PR PSYCHOTHERAPY W/PATIENT, 60 MIN: ICD-10-PCS | Mod: 95,,, | Performed by: PSYCHOLOGIST

## 2022-12-08 PROCEDURE — 90837 PSYTX W PT 60 MINUTES: CPT | Mod: 95,,, | Performed by: PSYCHOLOGIST

## 2023-01-03 ENCOUNTER — OFFICE VISIT (OUTPATIENT)
Dept: PSYCHIATRY | Facility: CLINIC | Age: 38
End: 2023-01-03
Payer: COMMERCIAL

## 2023-01-03 DIAGNOSIS — F43.23 ADJUSTMENT DISORDER WITH MIXED ANXIETY AND DEPRESSED MOOD: ICD-10-CM

## 2023-01-03 DIAGNOSIS — F45.22 BODY DYSMORPHIC DISORDER: Primary | ICD-10-CM

## 2023-01-03 PROCEDURE — 90837 PR PSYCHOTHERAPY W/PATIENT, 60 MIN: ICD-10-PCS | Mod: ,,, | Performed by: PSYCHOLOGIST

## 2023-01-03 PROCEDURE — 90837 PSYTX W PT 60 MINUTES: CPT | Mod: ,,, | Performed by: PSYCHOLOGIST
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
## 2023-01-17 ENCOUNTER — OFFICE VISIT (OUTPATIENT)
Dept: PSYCHIATRY | Facility: CLINIC | Age: 38
End: 2023-01-17
Payer: COMMERCIAL

## 2023-01-17 DIAGNOSIS — F45.22 BODY DYSMORPHIC DISORDER: Primary | ICD-10-CM

## 2023-01-17 DIAGNOSIS — F43.23 ADJUSTMENT DISORDER WITH MIXED ANXIETY AND DEPRESSED MOOD: ICD-10-CM

## 2023-01-17 PROCEDURE — 90837 PR PSYCHOTHERAPY W/PATIENT, 60 MIN: ICD-10-PCS | Mod: ,,, | Performed by: PSYCHOLOGIST

## 2023-01-17 PROCEDURE — 90837 PSYTX W PT 60 MINUTES: CPT | Mod: ,,, | Performed by: PSYCHOLOGIST

## 2023-01-17 NOTE — PROGRESS NOTES
"Individual Psychotherapy (PhD/LCSW)    1/17/2023    Therapeutic Intervention: Met with patient.  Outpatient - Behavior modifying psychotherapy 60 min - CPT code 44491    The patient location is: Patient's home/ Patient reported that her location at the time of this visit was in the [facility]     Visit type: Virtual visit with synchronous audio and video     Each patient to whom he or she provides medical services by telemedicine is: (1) informed of the relationship between the physician and patient and the respective role of any other health care provider with respect to management of the patient; and (2) notified that he or she may decline to receive medical services by telemedicine and may withdraw from such care at any time.     Chief complaint/reason for encounter: depression and anxiety     Interval history and content of current session: Patient presented casually dressed, alert, and oriented. Patient reported experiencing diminished interest, insomnia, fatigue, worthlessness/guilt, poor concentration, decreased libido, social isolation, irritability and muscle tension (vaginal wall).  Patient denied current suicidal and homicidal ideations.  Explored source of patient's depressed mood.  Active listening skills were used as patient described her history of low self esteem dating back from childhood when she was bullied about being overweight.  She also discussed her early family relationships and how her perception of "beauty" played a role in those relationships.  She discussed how she tends to be overly critical of herself especially around her appearance.  Educated patient on the relationship between her thoughts, feelings, and behaviors.  The patient was assisted in developing an awareness of her automatic thoughts that reflect depressogenic schemata.  Patient was asked to complete a thought record for homework.     Treatment plan:  Target symptoms: depression, anxiety , adjustment  Why chosen " therapy is appropriate versus another modality: relevant to diagnosis  Outcome monitoring methods: self-report  Therapeutic intervention type: insight oriented psychotherapy, behavior modifying psychotherapy    Risk parameters:  Patient reports no suicidal ideation  Patient reports no homicidal ideation  Patient reports no self-injurious behavior  Patient reports no violent behavior    Verbal deficits: None    Patient's response to intervention:  The patient's response to intervention is accepting.    Progress toward goals and other mental status changes:  The patient's progress toward goals is fair .    Diagnosis:     ICD-10-CM ICD-9-CM   1. Body dysmorphic disorder  F45.22 300.7   2. Adjustment disorder with mixed anxiety and depressed mood  F43.23 309.28       Plan:  individual psychotherapy    Return to clinic: 1 month    Length of Service (minutes): 60

## 2023-01-23 ENCOUNTER — PATIENT MESSAGE (OUTPATIENT)
Dept: PRIMARY CARE CLINIC | Facility: CLINIC | Age: 38
End: 2023-01-23
Payer: COMMERCIAL

## 2023-01-24 ENCOUNTER — OFFICE VISIT (OUTPATIENT)
Dept: PSYCHIATRY | Facility: CLINIC | Age: 38
End: 2023-01-24
Payer: MEDICAID

## 2023-01-24 DIAGNOSIS — F45.22 BODY DYSMORPHIC DISORDER: Primary | ICD-10-CM

## 2023-01-24 DIAGNOSIS — F43.23 ADJUSTMENT DISORDER WITH MIXED ANXIETY AND DEPRESSED MOOD: ICD-10-CM

## 2023-01-24 PROCEDURE — 90837 PR PSYCHOTHERAPY W/PATIENT, 60 MIN: ICD-10-PCS | Mod: ,,, | Performed by: PSYCHOLOGIST

## 2023-01-24 PROCEDURE — 90837 PSYTX W PT 60 MINUTES: CPT | Mod: ,,, | Performed by: PSYCHOLOGIST

## 2023-01-24 NOTE — PROGRESS NOTES
Individual Psychotherapy (PhD/LCSW)    1/24/2023    Site:  [facility]        Therapeutic Intervention: Met with patient.  Outpatient - Behavior modifying psychotherapy 60 min - CPT code 24921    Chief complaint/reason for encounter: depression and anxiety     Interval history and content of current session: Patient presented casually dressed, alert, and oriented. Patient reported experiencing diminished interest, insomnia, fatigue, worthlessness/guilt, poor concentration, decreased libido, social isolation, irritability and muscle tension (vaginal wall).  Patient denied current suicidal and homicidal ideations.  Explored source of patient's depressed mood.  Active listening skills were used as patient described the events leading up to and after recent discord with her .  Patient discussed feeling uncomfortable around her 's family, but agreeing to attend their family vacation.  Assisted patient in processing her feelings around being uncomfortable.  Patient exhibited good insight when she stated that she feels that she will be judged while on the trip and she dislikes certain comments that were made about her by her 's family.  Explored ways for patient to get more comfortable with her 's family prior to the trip.  Educated patient about using assertive communication skills to address her concerns and feelings with others.      Treatment plan:  Target symptoms: depression, anxiety , adjustment  Why chosen therapy is appropriate versus another modality: relevant to diagnosis  Outcome monitoring methods: self-report  Therapeutic intervention type: insight oriented psychotherapy, behavior modifying psychotherapy    Risk parameters:  Patient reports no suicidal ideation  Patient reports no homicidal ideation  Patient reports no self-injurious behavior  Patient reports no violent behavior    Verbal deficits: None    Patient's response to intervention:  The patient's response to  intervention is accepting.    Progress toward goals and other mental status changes:  The patient's progress toward goals is fair .    Diagnosis:     ICD-10-CM ICD-9-CM   1. Body dysmorphic disorder  F45.22 300.7   2. Adjustment disorder with mixed anxiety and depressed mood  F43.23 309.28         Plan:  individual psychotherapy    Return to clinic: 1 month    Length of Service (minutes): 60

## 2023-01-31 ENCOUNTER — OFFICE VISIT (OUTPATIENT)
Dept: INTERNAL MEDICINE | Facility: CLINIC | Age: 38
End: 2023-01-31
Attending: INTERNAL MEDICINE
Payer: MEDICAID

## 2023-01-31 ENCOUNTER — HOSPITAL ENCOUNTER (OUTPATIENT)
Dept: RADIOLOGY | Facility: OTHER | Age: 38
Discharge: HOME | End: 2023-01-31
Attending: INTERNAL MEDICINE
Payer: MEDICAID

## 2023-01-31 VITALS
DIASTOLIC BLOOD PRESSURE: 82 MMHG | HEART RATE: 76 BPM | HEIGHT: 62 IN | WEIGHT: 164.88 LBS | SYSTOLIC BLOOD PRESSURE: 132 MMHG | OXYGEN SATURATION: 100 % | BODY MASS INDEX: 30.34 KG/M2

## 2023-01-31 DIAGNOSIS — M79.671 RIGHT FOOT PAIN: Primary | ICD-10-CM

## 2023-01-31 DIAGNOSIS — M79.671 RIGHT FOOT PAIN: ICD-10-CM

## 2023-01-31 PROCEDURE — 73630 X-RAY EXAM OF FOOT: CPT | Mod: 26,RT,, | Performed by: RADIOLOGY

## 2023-01-31 PROCEDURE — 99213 OFFICE O/P EST LOW 20 MIN: CPT | Mod: PBBFAC | Performed by: INTERNAL MEDICINE

## 2023-01-31 PROCEDURE — 99214 OFFICE O/P EST MOD 30 MIN: CPT | Mod: S$GLB,,, | Performed by: INTERNAL MEDICINE

## 2023-01-31 PROCEDURE — 73630 X-RAY EXAM OF FOOT: CPT | Mod: TC,FY,RT

## 2023-01-31 PROCEDURE — 99999 PR PBB SHADOW E&M-EST. PATIENT-LVL III: CPT | Mod: PBBFAC,,, | Performed by: INTERNAL MEDICINE

## 2023-01-31 PROCEDURE — 99214 PR OFFICE/OUTPT VISIT, EST, LEVL IV, 30-39 MIN: ICD-10-PCS | Mod: S$PBB,,, | Performed by: INTERNAL MEDICINE

## 2023-01-31 PROCEDURE — 73630 XR FOOT COMPLETE 3 VIEW RIGHT: ICD-10-PCS | Mod: 26,RT,, | Performed by: RADIOLOGY

## 2023-01-31 PROCEDURE — 99999 PR PBB SHADOW E&M-EST. PATIENT-LVL III: ICD-10-PCS | Mod: PBBFAC,,, | Performed by: INTERNAL MEDICINE

## 2023-01-31 RX ORDER — FAMOTIDINE 40 MG/1
40 TABLET, FILM COATED ORAL DAILY
Qty: 30 TABLET | Refills: 0 | Status: SHIPPED | OUTPATIENT
Start: 2023-01-31 | End: 2023-07-07

## 2023-01-31 RX ORDER — NAPROXEN 500 MG/1
500 TABLET ORAL 2 TIMES DAILY WITH MEALS
Qty: 30 TABLET | Refills: 1 | Status: SHIPPED | OUTPATIENT
Start: 2023-01-31 | End: 2023-03-02

## 2023-01-31 NOTE — PROGRESS NOTES
"Subjective:   Patient ID: Carmina Hernandez is a 37 y.o. female  Chief complaint:   Chief Complaint   Patient presents with    Toe Injury     On Thursday; dropped weight on it       HPI  Pt new to me   Pcp: leticia    Now 5 days ago she accidentally dropped a 45 pound dumbell on her right foot at great toe  - since then toe more swollen at end of day over past few days   + Ice and elevation - not helping   Ibuprofen 4-5 pills as needed   No calf pain or redness or edema  Dec rom with bending right great toe  Sx mildly better with wearing supportive shoe     Review of Systems    Objective:  Vitals:    01/31/23 1158   BP: 132/82   BP Location: Left arm   Patient Position: Sitting   Pulse: 76   SpO2: 100%   Weight: 74.8 kg (164 lb 14.5 oz)   Height: 5' 2" (1.575 m)     Body mass index is 30.16 kg/m².    Physical Exam  Constitutional:       General: She is not in acute distress.     Appearance: She is well-developed. She is not diaphoretic.   Eyes:      General:         Right eye: No discharge.         Left eye: No discharge.   Pulmonary:      Effort: Pulmonary effort is normal. No respiratory distress.   Musculoskeletal:      Comments: Right foot:   Able to bend toe at mcp joint   Unable to bend at IP joint with swelling   Cap refill < 2sec  Foot warm to touch   Pulses +2      Skin:     General: Skin is warm.      Capillary Refill: Capillary refill takes less than 2 seconds.      Findings: No erythema.   Neurological:      Mental Status: She is alert and oriented to person, place, and time.   Psychiatric:         Mood and Affect: Mood normal.         Behavior: Behavior normal.         Thought Content: Thought content normal.         Judgment: Judgment normal.       Assessment:  1. Right foot pain        Plan:  Carmina was seen today for toe injury.    Diagnoses and all orders for this visit:    Right foot pain  -     X-Ray Foot Complete 3 view Right; Future  -     naproxen (NAPROSYN) 500 MG tablet; Take 1 tablet (500 mg " total) by mouth 2 (two) times daily with meals.  -     famotidine (PEPCID) 40 MG tablet; Take 1 tablet (40 mg total) by mouth once daily.    Will give trial of naprosyn bid for 1-2 weeks with pepcid and alt with apap arthritis   Cont rice therapy   Xray today to eval for fx  Consider podiatry referral for fx or if sx do not resolve/improve   All questions were answered and pt verbalized understanding of plan.     Addendum:   Xray without fx   Cont current plan as above     Health Maintenance   Topic Date Due    TETANUS VACCINE  01/01/2026    Hepatitis C Screening  Completed    Lipid Panel  Completed

## 2023-02-06 ENCOUNTER — OFFICE VISIT (OUTPATIENT)
Dept: PRIMARY CARE CLINIC | Facility: CLINIC | Age: 38
End: 2023-02-06
Payer: MEDICAID

## 2023-02-06 VITALS
SYSTOLIC BLOOD PRESSURE: 121 MMHG | WEIGHT: 166 LBS | HEART RATE: 66 BPM | HEIGHT: 62 IN | BODY MASS INDEX: 30.55 KG/M2 | DIASTOLIC BLOOD PRESSURE: 53 MMHG | TEMPERATURE: 99 F | OXYGEN SATURATION: 99 %

## 2023-02-06 DIAGNOSIS — R53.83 FATIGUE, UNSPECIFIED TYPE: Primary | ICD-10-CM

## 2023-02-06 DIAGNOSIS — D64.9 ANEMIA, UNSPECIFIED TYPE: ICD-10-CM

## 2023-02-06 DIAGNOSIS — H02.401 PTOSIS OF EYELID, RIGHT: ICD-10-CM

## 2023-02-06 LAB
B-HCG UR QL: NEGATIVE
BILIRUB UR QL STRIP: NEGATIVE
CLARITY UR REFRACT.AUTO: CLEAR
COLOR UR AUTO: COLORLESS
CTP QC/QA: YES
GLUCOSE UR QL STRIP: NEGATIVE
HGB UR QL STRIP: NEGATIVE
KETONES UR QL STRIP: NEGATIVE
LEUKOCYTE ESTERASE UR QL STRIP: NEGATIVE
NITRITE UR QL STRIP: NEGATIVE
PH UR STRIP: 6 [PH] (ref 5–8)
PROT UR QL STRIP: NEGATIVE
SP GR UR STRIP: 1.01 (ref 1–1.03)
URN SPEC COLLECT METH UR: ABNORMAL

## 2023-02-06 PROCEDURE — 99214 PR OFFICE/OUTPT VISIT, EST, LEVL IV, 30-39 MIN: ICD-10-PCS | Mod: S$PBB,,, | Performed by: NURSE PRACTITIONER

## 2023-02-06 PROCEDURE — 99999 PR PBB SHADOW E&M-EST. PATIENT-LVL IV: ICD-10-PCS | Mod: PBBFAC,,, | Performed by: NURSE PRACTITIONER

## 2023-02-06 PROCEDURE — 99999 PR PBB SHADOW E&M-EST. PATIENT-LVL IV: CPT | Mod: PBBFAC,,, | Performed by: NURSE PRACTITIONER

## 2023-02-06 PROCEDURE — 81003 URINALYSIS AUTO W/O SCOPE: CPT | Performed by: NURSE PRACTITIONER

## 2023-02-06 PROCEDURE — 99214 OFFICE O/P EST MOD 30 MIN: CPT | Mod: S$GLB,,, | Performed by: NURSE PRACTITIONER

## 2023-02-06 PROCEDURE — 99214 OFFICE O/P EST MOD 30 MIN: CPT | Mod: PBBFAC,PN | Performed by: NURSE PRACTITIONER

## 2023-02-06 PROCEDURE — 81025 URINE PREGNANCY TEST: CPT | Mod: PBBFAC,PN | Performed by: NURSE PRACTITIONER

## 2023-02-06 NOTE — PROGRESS NOTES
Subjective:       Patient ID: Carmina Hernandez is a 37 y.o. female.    Chief Complaint: Follow-up    Ms. Carmina Hernandez is a 36 year old female, established with me, presents to the clinic  for follow up. PCP is Hadley Araujo. Medical and surgical history in addition to problem list reviewed as listed below.     Patient  presents with complaints of fatigue, right eyelid drooping, states she cannot schedule an appointment to see Ophthalmology, no available appointments until 3-4 months. Denies pain/dryness of eye.      Works out 5 days a week, LMP 2023,  she has been craving sweets, experiencing fatigue daily,  has a vasectomy.      History reviewed. No pertinent past medical history.     Past Surgical History:   Procedure Laterality Date    KELOID EXCISION      chest        Family History   Problem Relation Age of Onset    Dementia Mother     Hypertension Father     No Known Problems Daughter     Breast cancer Maternal Aunt     Cancer Neg Hx     Colon cancer Neg Hx     Diabetes Neg Hx     Eclampsia Neg Hx     Miscarriages / Stillbirths Neg Hx     Ovarian cancer Neg Hx      labor Neg Hx     Stroke Neg Hx        Social History     Tobacco Use   Smoking Status Never   Smokeless Tobacco Never       Social History     Social History Narrative    Not on file       Review of patient's allergies indicates:   Allergen Reactions    Sulfa (sulfonamide antibiotics)         Review of Systems   Constitutional:  Positive for fatigue. Negative for fever.   Respiratory:  Negative for cough and shortness of breath.    Cardiovascular:  Negative for chest pain and palpitations.   Gastrointestinal:  Negative for nausea and vomiting.   Skin: Negative.    Neurological:  Negative for dizziness, light-headedness and headaches.       Objective:        Vitals:    23 1044   BP: (!) 121/53   Pulse: 66   Temp: 98.6 °F (37 °C)        Physical Exam  Constitutional:       General: She is not in acute distress.      Appearance: She is well-developed.   HENT:      Head: Normocephalic and atraumatic.      Right Ear: External ear normal.      Left Ear: External ear normal.   Eyes:      General: No scleral icterus.     Extraocular Movements: Extraocular movements intact.      Conjunctiva/sclera: Conjunctivae normal.   Cardiovascular:      Rate and Rhythm: Normal rate and regular rhythm.      Heart sounds: Normal heart sounds. No murmur heard.    No friction rub. No gallop.   Pulmonary:      Effort: Pulmonary effort is normal.      Breath sounds: Normal breath sounds. No wheezing or rales.   Musculoskeletal:         General: Normal range of motion.      Cervical back: Normal range of motion and neck supple.   Lymphadenopathy:      Cervical: No cervical adenopathy.   Skin:     General: Skin is warm and dry.      Findings: No erythema or rash.   Neurological:      Mental Status: She is alert and oriented to person, place, and time.      Cranial Nerves: No cranial nerve deficit.   Psychiatric:         Mood and Affect: Mood normal.         Behavior: Behavior normal.       Assessment:       1. Fatigue, unspecified type    2. Iron deficiency anemia, unspecified iron deficiency anemia type    3. Ptosis of eyelid, right        Plan:       Fatigue, unspecified type  Rule out diabetes, thyroid disorder, vitamin B12 deficiency  -     Comprehensive Metabolic Panel; Future; Expected date: 02/06/2023  -     TSH; Future; Expected date: 02/06/2023  -     T4, Free; Future; Expected date: 02/06/2023  -     Iron and TIBC; Future; Expected date: 02/06/2023  -     Ferritin; Future; Expected date: 02/06/2023  -     Magnesium; Future; Expected date: 02/06/2023  -     Urinalysis, Reflex to Urine Culture Urine, Clean Catch  -     FOLATE; Future; Expected date: 02/06/2023  -     POCT urine pregnancy    Iron deficiency anemia, unspecified iron deficiency anemia type  Stable with over-the-counter multivitamin with iron supplement.  -     CBC Auto Differential;  Future; Expected date: 2023    Ptosis of eyelid, right  External referral to [facility].  -     Ambulatory referral/consult to Ophthalmology; Future; Expected date: 2023    Labs pending.    Chart review.  2022 hemoglobin 11 cm the age 32   Health maintenance review/updated.    Suspect fatigue may be related to anemia, recently started over-the-counter multivitamin with iron supplement.      Medication List with Changes/Refills   Current Medications    FAMOTIDINE (PEPCID) 40 MG TABLET    Take 1 tablet (40 mg total) by mouth once daily.    KETOCONAZOLE (NIZORAL) 2 % CREAM    SMARTSI Topical Daily PRN    NAPROXEN (NAPROSYN) 500 MG TABLET    Take 1 tablet (500 mg total) by mouth 2 (two) times daily with meals.            Follow up if symptoms worsen or fail to improve.    I spent a total of 30 minutes on the day of the visit.This includes face to face time and non-face to face time preparing to see the patient (eg, review of tests), obtaining and/or reviewing separately obtained history, documenting clinical information in the electronic or other health record, independently interpreting results and communicating results to the patient/family/caregiver, or care coordinator.       Adriana Esparza, APRN, MSN, FNP-C

## 2023-02-20 ENCOUNTER — OFFICE VISIT (OUTPATIENT)
Dept: PSYCHIATRY | Facility: CLINIC | Age: 38
End: 2023-02-20
Payer: COMMERCIAL

## 2023-02-20 DIAGNOSIS — F43.23 ADJUSTMENT DISORDER WITH MIXED ANXIETY AND DEPRESSED MOOD: ICD-10-CM

## 2023-02-20 DIAGNOSIS — F45.22 BODY DYSMORPHIC DISORDER: Primary | ICD-10-CM

## 2023-02-20 PROCEDURE — 90834 PSYTX W PT 45 MINUTES: CPT | Mod: 95,,, | Performed by: PSYCHOLOGIST

## 2023-02-20 PROCEDURE — 90834 PR PSYCHOTHERAPY W/PATIENT, 45 MIN: ICD-10-PCS | Mod: 95,,, | Performed by: PSYCHOLOGIST

## 2023-02-28 ENCOUNTER — OFFICE VISIT (OUTPATIENT)
Dept: PSYCHIATRY | Facility: CLINIC | Age: 38
End: 2023-02-28
Payer: COMMERCIAL

## 2023-02-28 DIAGNOSIS — F45.22 BODY DYSMORPHIC DISORDER: Primary | ICD-10-CM

## 2023-02-28 DIAGNOSIS — F43.23 ADJUSTMENT DISORDER WITH MIXED ANXIETY AND DEPRESSED MOOD: ICD-10-CM

## 2023-02-28 PROCEDURE — 90837 PR PSYCHOTHERAPY W/PATIENT, 60 MIN: ICD-10-PCS | Mod: S$GLB,,, | Performed by: PSYCHOLOGIST

## 2023-02-28 PROCEDURE — 90837 PSYTX W PT 60 MINUTES: CPT | Mod: S$GLB,,, | Performed by: PSYCHOLOGIST

## 2023-02-28 NOTE — PROGRESS NOTES
Individual Psychotherapy (PhD/LCSW)    2/28/2023    Site:  [facility]        Therapeutic Intervention: Met with patient.  Outpatient - Behavior modifying psychotherapy 60 min - CPT code 04710    Chief complaint/reason for encounter: depression and anxiety     Interval history and content of current session: Patient presented casually dressed, alert, and oriented. Patient reported experiencing diminished interest, insomnia, fatigue, worthlessness/guilt, poor concentration, decreased libido, social isolation, irritability and muscle tension (vaginal wall).  Patient denied current suicidal and homicidal ideations.  Explored source of patient's depressed mood.  Active listening skills were used as patient described her feelings around her upcoming vacation with her 's family including feeling uncomfortable as she has packed attire that is not her preference for what she thinks looks best on her, but that she thinks will be better received by her 's family.  Discussed patient approaching the trip with feelings of doom already.  Explored ways for patient to be able to enjoy the trip while being her authentic self.  Patient also discussed her concerns about her marriage and lack of intimacy.  Assisted patient in processing her feelings around her marriage and explored her desire to stay .  Patient exhibited good insight when she stated that she is comfortable with her  and their relationship as well as she appreciates the ways he treats her and her daughter.      Treatment plan:  Target symptoms: depression, anxiety , adjustment  Why chosen therapy is appropriate versus another modality: relevant to diagnosis  Outcome monitoring methods: self-report  Therapeutic intervention type: insight oriented psychotherapy, behavior modifying psychotherapy    Risk parameters:  Patient reports no suicidal ideation  Patient reports no homicidal ideation  Patient reports no self-injurious  behavior  Patient reports no violent behavior    Verbal deficits: None    Patient's response to intervention:  The patient's response to intervention is accepting.    Progress toward goals and other mental status changes:  The patient's progress toward goals is fair .    Diagnosis:     ICD-10-CM ICD-9-CM   1. Body dysmorphic disorder  F45.22 300.7   2. Adjustment disorder with mixed anxiety and depressed mood  F43.23 309.28       Plan:  individual psychotherapy    Return to clinic: 1 month    Length of Service (minutes): 60

## 2023-02-28 NOTE — PROGRESS NOTES
Individual Psychotherapy (PhD/LCSW)    2/20/2023    Therapeutic Intervention: Met with patient.  Outpatient - Behavior modifying psychotherapy 45 min - CPT code 55522    The patient location is: Patient's home/ Patient reported that her location at the time of this visit was in the [facility]     Visit type: Virtual visit with synchronous audio and video     Each patient to whom he or she provides medical services by telemedicine is: (1) informed of the relationship between the physician and patient and the respective role of any other health care provider with respect to management of the patient; and (2) notified that he or she may decline to receive medical services by telemedicine and may withdraw from such care at any time.     Chief complaint/reason for encounter: depression and anxiety     Interval history and content of current session: Patient presented casually dressed, alert, and oriented. Patient reported experiencing diminished interest, insomnia, fatigue, worthlessness/guilt, poor concentration, decreased libido, social isolation, irritability and muscle tension (vaginal wall).  Patient denied current suicidal and homicidal ideations.  Explored source of patient's depressed mood.  Active listening skills were used as patient described her concerns about an upcoming family trip with her 's family.  Assisted patient in processing her feelings around the trip and she discussed potential feelings of isolation since she has limited relationships with her 's family.  Assisted patient in exploring ways to connect with her 's family prior to the trip. Discussed patient sharing her feelings with her .  Patient also discussed feeling discontent with her current life.  Assisted patient in exploring what she needs to feel fulfilled including when was her last time feeling complete.    Treatment plan:  Target symptoms: depression, anxiety , adjustment  Why chosen therapy is  appropriate versus another modality: relevant to diagnosis  Outcome monitoring methods: self-report  Therapeutic intervention type: insight oriented psychotherapy, behavior modifying psychotherapy    Risk parameters:  Patient reports no suicidal ideation  Patient reports no homicidal ideation  Patient reports no self-injurious behavior  Patient reports no violent behavior    Verbal deficits: None    Patient's response to intervention:  The patient's response to intervention is accepting.    Progress toward goals and other mental status changes:  The patient's progress toward goals is fair .    Diagnosis:     ICD-10-CM ICD-9-CM   1. Body dysmorphic disorder  F45.22 300.7   2. Adjustment disorder with mixed anxiety and depressed mood  F43.23 309.28       Plan:  individual psychotherapy    Return to clinic: 1 month    Length of Service (minutes): 45

## 2023-03-28 ENCOUNTER — OFFICE VISIT (OUTPATIENT)
Dept: PSYCHIATRY | Facility: CLINIC | Age: 38
End: 2023-03-28
Payer: MEDICAID

## 2023-03-28 DIAGNOSIS — F43.23 ADJUSTMENT DISORDER WITH MIXED ANXIETY AND DEPRESSED MOOD: ICD-10-CM

## 2023-03-28 DIAGNOSIS — F45.22 BODY DYSMORPHIC DISORDER: Primary | ICD-10-CM

## 2023-03-28 PROCEDURE — 90837 PSYTX W PT 60 MINUTES: CPT | Mod: ,,, | Performed by: PSYCHOLOGIST

## 2023-03-28 PROCEDURE — 90837 PR PSYCHOTHERAPY W/PATIENT, 60 MIN: ICD-10-PCS | Mod: ,,, | Performed by: PSYCHOLOGIST

## 2023-03-28 NOTE — PROGRESS NOTES
Individual Psychotherapy (PhD/LCSW)    3/28/2023    Site:  [facility]        Therapeutic Intervention: Met with patient.  Outpatient - Behavior modifying psychotherapy 60 min - CPT code 17868    Chief complaint/reason for encounter: depression and anxiety     Interval history and content of current session: Patient presented casually dressed, alert, and oriented. Patient reported experiencing diminished interest, insomnia, fatigue, worthlessness/guilt, poor concentration, decreased libido, social isolation, irritability and muscle tension (vaginal wall).  Patient denied current suicidal and homicidal ideations.  Explored source of patient's depressed mood.  Active listening skills were used as patient described her feelings around a recent vacation with her 's family including feeling uncomfortable with some comments made by some of her 's family members.  Educated patient about using assertive communication skills to address her concerns and feelings with others.  Patient was taught behavioral coping strategies such as sharing of feelings, setting boundaries, and increased assertiveness as ways to reduce feelings of depression and anxiety.  Patient also discussed her concerns about her marriage including lack of intimacy.      Treatment plan:  Target symptoms: depression, anxiety , adjustment  Why chosen therapy is appropriate versus another modality: relevant to diagnosis  Outcome monitoring methods: self-report  Therapeutic intervention type: insight oriented psychotherapy, behavior modifying psychotherapy    Risk parameters:  Patient reports no suicidal ideation  Patient reports no homicidal ideation  Patient reports no self-injurious behavior  Patient reports no violent behavior    Verbal deficits: None    Patient's response to intervention:  The patient's response to intervention is accepting.    Progress toward goals and other mental status changes:  The patient's progress toward goals is  fair .    Diagnosis:     ICD-10-CM ICD-9-CM   1. Body dysmorphic disorder  F45.22 300.7   2. Adjustment disorder with mixed anxiety and depressed mood  F43.23 309.28         Plan:  individual psychotherapy    Return to clinic: 1 month    Length of Service (minutes): 60

## 2023-04-28 ENCOUNTER — OFFICE VISIT (OUTPATIENT)
Dept: PRIMARY CARE CLINIC | Facility: CLINIC | Age: 38
End: 2023-04-28
Payer: COMMERCIAL

## 2023-04-28 VITALS
HEIGHT: 62 IN | DIASTOLIC BLOOD PRESSURE: 66 MMHG | BODY MASS INDEX: 30.89 KG/M2 | WEIGHT: 167.88 LBS | SYSTOLIC BLOOD PRESSURE: 103 MMHG | HEART RATE: 80 BPM | OXYGEN SATURATION: 98 %

## 2023-04-28 DIAGNOSIS — F45.22 BODY DYSMORPHIC DISORDER: Primary | ICD-10-CM

## 2023-04-28 DIAGNOSIS — Z71.3 WEIGHT LOSS COUNSELING, ENCOUNTER FOR: ICD-10-CM

## 2023-04-28 PROCEDURE — 99999 PR PBB SHADOW E&M-EST. PATIENT-LVL IV: ICD-10-PCS | Mod: PBBFAC,,, | Performed by: NURSE PRACTITIONER

## 2023-04-28 PROCEDURE — 99999 PR PBB SHADOW E&M-EST. PATIENT-LVL IV: CPT | Mod: PBBFAC,,, | Performed by: NURSE PRACTITIONER

## 2023-04-28 PROCEDURE — 99214 OFFICE O/P EST MOD 30 MIN: CPT | Mod: S$GLB,,, | Performed by: NURSE PRACTITIONER

## 2023-04-28 PROCEDURE — 99214 PR OFFICE/OUTPT VISIT, EST, LEVL IV, 30-39 MIN: ICD-10-PCS | Mod: S$GLB,,, | Performed by: NURSE PRACTITIONER

## 2023-04-28 NOTE — PROGRESS NOTES
Subjective:       Patient ID: Carmina Hernandez is a 37 y.o. female.    Chief Complaint: Body image Concerns.    Ms. Carmina Hernandez is a 36 year old female, established with me, presents to the clinic with concerns about body image. PCP is Hadley Araujo. Medical and surgical history in addition to problem list reviewed as listed below.    Patient presents with complaints of body image, states she has gained 9 pounds since 2022, reports feeling like she is overweight, exercises daily and maintains a heart healthy diet. Patient states she always feels hungry and has double to triple portions when eating. Not currently employed.     Denies anxiety/stress/depression. States she would like a referral to a behavioral health therapist.      History reviewed. No pertinent past medical history.     Past Surgical History:   Procedure Laterality Date    KELOID EXCISION      chest        Family History   Problem Relation Age of Onset    Dementia Mother     Hypertension Father     No Known Problems Daughter     Breast cancer Maternal Aunt     Cancer Neg Hx     Colon cancer Neg Hx     Diabetes Neg Hx     Eclampsia Neg Hx     Miscarriages / Stillbirths Neg Hx     Ovarian cancer Neg Hx      labor Neg Hx     Stroke Neg Hx        Social History     Tobacco Use   Smoking Status Never   Smokeless Tobacco Never       Social History     Social History Narrative    Not on file       Review of patient's allergies indicates:   Allergen Reactions    Sulfa (sulfonamide antibiotics)         Review of Systems   Constitutional:  Negative for fatigue and fever.   Respiratory:  Negative for shortness of breath.    Cardiovascular:  Negative for chest pain and palpitations.   Gastrointestinal:  Negative for nausea and vomiting.   Musculoskeletal: Negative.    Skin: Negative.    Neurological:  Negative for headaches.       Objective:      Vitals:    23 0959   BP: 103/66   BP Location: Left arm   Patient Position: Sitting   BP  "Method: Small (Automatic)   Pulse: 80   SpO2: 98%   Weight: 76.1 kg (167 lb 14.1 oz)   Height: 5' 2" (1.575 m)       Physical Exam  Constitutional:       General: She is not in acute distress.     Appearance: She is well-developed.   HENT:      Head: Normocephalic and atraumatic.      Right Ear: External ear normal.      Left Ear: External ear normal.   Eyes:      General: No scleral icterus.     Extraocular Movements: Extraocular movements intact.      Conjunctiva/sclera: Conjunctivae normal.   Cardiovascular:      Rate and Rhythm: Normal rate and regular rhythm.      Heart sounds: Normal heart sounds. No murmur heard.    No friction rub. No gallop.   Pulmonary:      Effort: Pulmonary effort is normal.      Breath sounds: Normal breath sounds. No wheezing or rales.   Musculoskeletal:         General: Normal range of motion.      Cervical back: Normal range of motion and neck supple.   Lymphadenopathy:      Cervical: No cervical adenopathy.   Skin:     General: Skin is warm and dry.      Findings: No erythema or rash.   Neurological:      Mental Status: She is alert and oriented to person, place, and time.      Cranial Nerves: No cranial nerve deficit.   Psychiatric:         Mood and Affect: Mood normal.         Behavior: Behavior normal.       Assessment:       1. Body dysmorphic disorder    2. Weight loss counseling, encounter for        Plan:       Body dysmorphic disorder  Body Image concerns, unhappy with weight gain.  -     Ambulatory referral/consult to Behavioral Health; Future; Expected date: 05/05/2023    Weight loss counseling, encounter for  Concerned about 9 pound weight gain since October 2022.  Interested in weight loss options.  -     Ambulatory referral/consult to Bariatric Medicine; Future; Expected date: 04/28/2023    Discussion about body dysmorphic disorder.  Interventions such as journaling and possibly seeking a part-time job while daughter is in school, to balance idle time at home " everyday.    Medication List with Changes/Refills   Current Medications    FAMOTIDINE (PEPCID) 40 MG TABLET    Take 1 tablet (40 mg total) by mouth once daily.    KETOCONAZOLE (NIZORAL) 2 % CREAM    SMARTSI Topical Daily PRN            Follow up in about 2 months (around 2023), or if symptoms worsen or fail to improve, for Adriana Esparza, MSN, APRN, FNP-C.    I spent a total of 30 minutes on the day of the visit.  This includes face to face time and non-face to face time preparing to see the patient (eg, review of tests), obtaining and/or reviewing separately obtained history, documenting clinical information in the electronic or other health record, independently interpreting results and communicating results to the patient/family/caregiver, or care coordinator.     MARIN Mclean, MSN, FNP-C

## 2023-05-23 ENCOUNTER — OFFICE VISIT (OUTPATIENT)
Dept: PSYCHIATRY | Facility: CLINIC | Age: 38
End: 2023-05-23
Payer: COMMERCIAL

## 2023-05-23 DIAGNOSIS — F43.23 ADJUSTMENT DISORDER WITH MIXED ANXIETY AND DEPRESSED MOOD: ICD-10-CM

## 2023-05-23 DIAGNOSIS — F45.22 BODY DYSMORPHIC DISORDER: Primary | ICD-10-CM

## 2023-05-23 PROCEDURE — 90837 PSYTX W PT 60 MINUTES: CPT | Mod: S$GLB,,, | Performed by: PSYCHOLOGIST

## 2023-05-23 PROCEDURE — 90837 PR PSYCHOTHERAPY W/PATIENT, 60 MIN: ICD-10-PCS | Mod: S$GLB,,, | Performed by: PSYCHOLOGIST

## 2023-05-23 NOTE — PROGRESS NOTES
"Individual Psychotherapy (PhD/LCSW)    5/23/2023    Site:  [facility]        Therapeutic Intervention: Met with patient.  Outpatient - Behavior modifying psychotherapy 60 min - CPT code 71277    Chief complaint/reason for encounter: depression and anxiety     Interval history and content of current session: Patient presented casually dressed, alert, and oriented. Patient reported experiencing diminished interest, insomnia, fatigue, worthlessness/guilt, poor concentration, decreased libido, social isolation, irritability and muscle tension (vaginal wall).  Patient denied current suicidal and homicidal ideations.  Explored source of patient's depressed mood.  Active listening skills were used as patient described feeling like she is discontent with everything and has "a tendency to complain about everything."  Discussed patient's desire for perfection.  Patient exhibited good insight when she stated that she often feels disappointed when she does not live up to her own standards she sets for herself.  Educated patient on the relationship between thoughts, feelings, and behaviors.  Patient described long history of maladaptive thinking patterns dating back to her childhood which increased after the death of her mother when patient was about 17 years old.  She stated that she does not seem to appreciate things especially after her mother's death.    Treatment plan:  Target symptoms: depression, anxiety , adjustment  Why chosen therapy is appropriate versus another modality: relevant to diagnosis  Outcome monitoring methods: self-report  Therapeutic intervention type: insight oriented psychotherapy, behavior modifying psychotherapy    Risk parameters:  Patient reports no suicidal ideation  Patient reports no homicidal ideation  Patient reports no self-injurious behavior  Patient reports no violent behavior    Verbal deficits: None    Patient's response to intervention:  The patient's response to intervention is " accepting.    Progress toward goals and other mental status changes:  The patient's progress toward goals is fair .    Diagnosis:     ICD-10-CM ICD-9-CM   1. Body dysmorphic disorder  F45.22 300.7   2. Adjustment disorder with mixed anxiety and depressed mood  F43.23 309.28       Plan:  individual psychotherapy, patient was also referred to [facility] for specialized treatment of body dysmorphic disorder    Return to clinic: 1 month    Length of Service (minutes): 60

## 2023-06-19 ENCOUNTER — OFFICE VISIT (OUTPATIENT)
Dept: OPTOMETRY | Facility: CLINIC | Age: 38
End: 2023-06-19
Payer: COMMERCIAL

## 2023-06-19 DIAGNOSIS — H02.401 PTOSIS OF EYELID, RIGHT: Primary | ICD-10-CM

## 2023-06-19 PROCEDURE — 99999 PR PBB SHADOW E&M-EST. PATIENT-LVL III: ICD-10-PCS | Mod: PBBFAC,,,

## 2023-06-19 PROCEDURE — 99999 PR PBB SHADOW E&M-EST. PATIENT-LVL III: CPT | Mod: PBBFAC,,,

## 2023-06-19 PROCEDURE — 92002 INTRM OPH EXAM NEW PATIENT: CPT | Mod: S$GLB,,,

## 2023-06-19 PROCEDURE — 92002 PR EYE EXAM, NEW PATIENT,INTERMED: ICD-10-PCS | Mod: S$GLB,,,

## 2023-06-19 NOTE — PROGRESS NOTES
HPI    The patient reports that she has had a drooping RUL for 3 years now,   eyelid feels heavy. Reports that she sees more and everything is brighter   when she lifts up eyelid, but it gives her a headache. Has noticed for   about 3 years, has worsened gradually. Has received Botox injections for   ptosis in the past, but states those injections didn't help. Referred by   Adriana Esparza NP for ptosis eval.  Reports excellent vision. Denies ocular irritation or using gtts. No f/f   OU. No ocular trauma or surgery.    Last edited by Laura Win, OD on 6/20/2023  4:15 PM.        ROS    Positive for: Eyes  Negative for: Constitutional, Gastrointestinal, Neurological, Skin,   Genitourinary, Musculoskeletal, HENT, Endocrine, Cardiovascular,   Respiratory, Psychiatric, Allergic/Imm, Heme/Lymph  Last edited by Laura Win, OD on 6/19/2023  9:23 AM.        Assessment /Plan     For exam results, see Encounter Report.    Ptosis of eyelid, right  -     Ambulatory referral/consult to Ophthalmology      Patient educated on condition and findings. Pupils are round and equally reactive to light. No sign of space occupying lesion causing ptosis given normal pupils. Causes could be idiopathic, cicatricial, etc. Discussed treatment options with patient, which include Upneeq gtts, Botox with Dr. Cortes, or ptosis/Spangler's sx. Advised patient that all treatments should help alleviate her headaches. Patient concerned about surgery due to history of keloids. Gave sample of Upneeq, 1gtt qd. Patient to call for Rx if she likes or for Botox consult with Dr. Cortes. Monitor annually.    RTC in 1 year for annual eye exam or sooner prn.      Addendum 6/23/2023: Rx'ed Upneeq 1 drop qd OD. Referral placed to Eyelid and Facial Consultation for Botox eval for ptosis.

## 2023-06-20 ENCOUNTER — OFFICE VISIT (OUTPATIENT)
Dept: PSYCHIATRY | Facility: CLINIC | Age: 38
End: 2023-06-20
Payer: COMMERCIAL

## 2023-06-20 DIAGNOSIS — F45.22 BODY DYSMORPHIC DISORDER: Primary | ICD-10-CM

## 2023-06-20 DIAGNOSIS — F43.23 ADJUSTMENT DISORDER WITH MIXED ANXIETY AND DEPRESSED MOOD: ICD-10-CM

## 2023-06-20 PROCEDURE — 90837 PSYTX W PT 60 MINUTES: CPT | Mod: S$GLB,,, | Performed by: PSYCHOLOGIST

## 2023-06-20 PROCEDURE — 90837 PR PSYCHOTHERAPY W/PATIENT, 60 MIN: ICD-10-PCS | Mod: S$GLB,,, | Performed by: PSYCHOLOGIST

## 2023-06-20 NOTE — PROGRESS NOTES
Individual Psychotherapy (PhD/LCSW)    6/20/2023    Site:  [facility]        Therapeutic Intervention: Met with patient.  Outpatient - Behavior modifying psychotherapy 60 min - CPT code 81207    Chief complaint/reason for encounter: depression and anxiety     Interval history and content of current session: Patient presented casually dressed, alert, and oriented. Patient reported experiencing diminished interest, insomnia, fatigue, worthlessness/guilt, poor concentration, decreased libido, social isolation, irritability and muscle tension (vaginal wall).  Patient denied current suicidal and homicidal ideations.  Explored source of patient's depressed mood.  Active listening skills were used as patient described feeling like she is not attracted to her .  Assisted patient in processing her feelings around her marriage.  Patient exhibited good insight when she stated that she has dated others who she was more attracted to, but who treated her poorly so she appreciates the way her  treats her.  Explored source of patient's ideas around attractiveness.  Patient discussed being bullied for being overweight during childhood which she stated led to her preoccupation with her body image.  Discussed treatment options for patient's symptoms of Body Dysmorphic Disorder including patient being referred to [facility] which patient agreed to.    Treatment plan:  Target symptoms: depression, anxiety , adjustment  Why chosen therapy is appropriate versus another modality: relevant to diagnosis  Outcome monitoring methods: self-report  Therapeutic intervention type: insight oriented psychotherapy, behavior modifying psychotherapy    Risk parameters:  Patient reports no suicidal ideation  Patient reports no homicidal ideation  Patient reports no self-injurious behavior  Patient reports no violent behavior    Verbal deficits: None    Patient's response to intervention:  The patient's response to intervention is  accepting.    Progress toward goals and other mental status changes:  The patient's progress toward goals is fair .    Diagnosis:     ICD-10-CM ICD-9-CM   1. Body dysmorphic disorder  F45.22 300.7   2. Adjustment disorder with mixed anxiety and depressed mood  F43.23 309.28       Plan:  individual psychotherapy, patient was also referred to [facility] for specialized treatment of body dysmorphic disorder    Return to clinic: 2 weeks, 1 month    Length of Service (minutes): 60

## 2023-06-22 ENCOUNTER — PATIENT MESSAGE (OUTPATIENT)
Dept: OPTOMETRY | Facility: CLINIC | Age: 38
End: 2023-06-22
Payer: COMMERCIAL

## 2023-06-27 ENCOUNTER — OFFICE VISIT (OUTPATIENT)
Dept: PSYCHIATRY | Facility: CLINIC | Age: 38
End: 2023-06-27
Payer: COMMERCIAL

## 2023-06-27 DIAGNOSIS — F45.22 BODY DYSMORPHIC DISORDER: Primary | ICD-10-CM

## 2023-06-27 DIAGNOSIS — F43.23 ADJUSTMENT DISORDER WITH MIXED ANXIETY AND DEPRESSED MOOD: ICD-10-CM

## 2023-06-27 PROCEDURE — 90837 PSYTX W PT 60 MINUTES: CPT | Mod: S$GLB,,, | Performed by: PSYCHOLOGIST

## 2023-06-27 PROCEDURE — 90837 PR PSYCHOTHERAPY W/PATIENT, 60 MIN: ICD-10-PCS | Mod: S$GLB,,, | Performed by: PSYCHOLOGIST

## 2023-06-27 NOTE — PROGRESS NOTES
Individual Psychotherapy (PhD/LCSW)    6/27/2023    Site:  [facility]        Therapeutic Intervention: Met with patient.  Outpatient - Behavior modifying psychotherapy 60 min - CPT code 90187    Chief complaint/reason for encounter: depression and anxiety     Interval history and content of current session: Patient presented casually dressed, alert, and oriented. Patient reported experiencing diminished interest, insomnia, fatigue, worthlessness/guilt, poor concentration, decreased libido, social isolation, irritability and muscle tension (vaginal wall).  Patient denied current suicidal and homicidal ideations.  Explored source of patient's depressed mood.  Patient reported that she contacted [facility] and they only have an inpatient program so she is looking for an outpatient therapist in her insurance network who specializes in treatment of body dysmorphic disorder (BDD).  Active listening skills were used as patient described how her issues around body image affect several areas of her functioning including negative impacts on her friendships and her projections of perfectionism onto her  and daughter.  Assisted patient in processing her feelings around being bullied due to her appearance in childhood.  Educated patient about social anxiety and assessed her symptoms.  Discussed how patient's symptoms better fit with the diagnosis of BDD although some of her thoughts about herself cause her to feel uncomfortable in social settings.       Treatment plan:  Target symptoms: depression, anxiety , adjustment  Why chosen therapy is appropriate versus another modality: relevant to diagnosis  Outcome monitoring methods: self-report  Therapeutic intervention type: insight oriented psychotherapy, behavior modifying psychotherapy    Risk parameters:  Patient reports no suicidal ideation  Patient reports no homicidal ideation  Patient reports no self-injurious behavior  Patient reports no violent  behavior    Verbal deficits: None    Patient's response to intervention:  The patient's response to intervention is accepting.    Progress toward goals and other mental status changes:  The patient's progress toward goals is fair .    Diagnosis:     ICD-10-CM ICD-9-CM   1. Body dysmorphic disorder  F45.22 300.7   2. Adjustment disorder with mixed anxiety and depressed mood  F43.23 309.28       Plan:  individual psychotherapy    Return to clinic: 2 weeks, 1 month    Length of Service (minutes): 60

## 2023-07-07 ENCOUNTER — OFFICE VISIT (OUTPATIENT)
Dept: PRIMARY CARE CLINIC | Facility: CLINIC | Age: 38
End: 2023-07-07
Payer: COMMERCIAL

## 2023-07-07 VITALS
DIASTOLIC BLOOD PRESSURE: 63 MMHG | SYSTOLIC BLOOD PRESSURE: 106 MMHG | WEIGHT: 165.13 LBS | HEART RATE: 59 BPM | OXYGEN SATURATION: 100 % | HEIGHT: 62 IN | BODY MASS INDEX: 30.39 KG/M2

## 2023-07-07 DIAGNOSIS — E66.9 OBESITY, CLASS I, BMI 30.0-34.9 (SEE ACTUAL BMI): ICD-10-CM

## 2023-07-07 DIAGNOSIS — H02.401 PTOSIS OF EYELID, RIGHT: ICD-10-CM

## 2023-07-07 DIAGNOSIS — R53.83 FATIGUE, UNSPECIFIED TYPE: ICD-10-CM

## 2023-07-07 DIAGNOSIS — F45.22 BODY DYSMORPHIC DISORDER: Primary | ICD-10-CM

## 2023-07-07 PROCEDURE — 99214 OFFICE O/P EST MOD 30 MIN: CPT | Mod: S$GLB,,, | Performed by: NURSE PRACTITIONER

## 2023-07-07 PROCEDURE — 99999 PR PBB SHADOW E&M-EST. PATIENT-LVL IV: CPT | Mod: PBBFAC,,, | Performed by: NURSE PRACTITIONER

## 2023-07-07 PROCEDURE — 99999 PR PBB SHADOW E&M-EST. PATIENT-LVL IV: ICD-10-PCS | Mod: PBBFAC,,, | Performed by: NURSE PRACTITIONER

## 2023-07-07 PROCEDURE — 99214 PR OFFICE/OUTPT VISIT, EST, LEVL IV, 30-39 MIN: ICD-10-PCS | Mod: S$GLB,,, | Performed by: NURSE PRACTITIONER

## 2023-07-07 NOTE — PROGRESS NOTES
Subjective:       Patient ID: Carmina Hernandez is a 37 y.o. female.    Chief Complaint: Follow-up    Ms. Carmina Hernandez is a 36 year old female, established with me, presents to the clinic  for follow up. PCP is Hadley Araujo. Medical and surgical history in addition to problem list reviewed as listed below.     Patient  reports that she continue to experience fatigue, seen by opthalmology for right eyelid drooping,  Denies pain/dryness of eye.       Exercises 5 days a week, complains of increased appetite with cravings for sweets.      History reviewed. No pertinent past medical history.     Past Surgical History:   Procedure Laterality Date    KELOID EXCISION      chest        Family History   Problem Relation Age of Onset    Dementia Mother     Hypertension Father     No Known Problems Daughter     Breast cancer Maternal Aunt     Cancer Neg Hx     Colon cancer Neg Hx     Diabetes Neg Hx     Eclampsia Neg Hx     Miscarriages / Stillbirths Neg Hx     Ovarian cancer Neg Hx      labor Neg Hx     Stroke Neg Hx     Glaucoma Neg Hx     Macular degeneration Neg Hx     Retinal detachment Neg Hx        Social History     Tobacco Use   Smoking Status Never   Smokeless Tobacco Never       Social History     Social History Narrative    Not on file       Review of patient's allergies indicates:   Allergen Reactions    Sulfa (sulfonamide antibiotics)         Review of Systems   Constitutional:  Positive for fatigue. Negative for fever.   Respiratory:  Negative for cough and shortness of breath.    Cardiovascular:  Negative for chest pain and palpitations.   Gastrointestinal:  Negative for nausea and vomiting.   Musculoskeletal: Negative.    Skin: Negative.    Neurological:  Negative for dizziness, light-headedness and headaches.       Objective:        Vitals:    23 1038   BP: 106/63   Pulse: (!) 59        Physical Exam  Constitutional:       General: She is not in acute distress.     Appearance: She is  "well-developed.   HENT:      Head: Normocephalic and atraumatic.      Right Ear: External ear normal.      Left Ear: External ear normal.   Eyes:      General: No scleral icterus.     Extraocular Movements: Extraocular movements intact.      Conjunctiva/sclera: Conjunctivae normal.   Cardiovascular:      Rate and Rhythm: Normal rate and regular rhythm.      Heart sounds: Normal heart sounds. No murmur heard.    No friction rub. No gallop.   Pulmonary:      Effort: Pulmonary effort is normal.      Breath sounds: Normal breath sounds. No wheezing or rales.   Musculoskeletal:         General: Normal range of motion.      Cervical back: Normal range of motion and neck supple.   Lymphadenopathy:      Cervical: No cervical adenopathy.   Skin:     General: Skin is warm and dry.      Findings: No erythema or rash.   Neurological:      Mental Status: She is alert and oriented to person, place, and time.      Cranial Nerves: No cranial nerve deficit.   Psychiatric:         Mood and Affect: Mood normal.         Behavior: Behavior normal.       Assessment:       1. Body dysmorphic disorder    2. Fatigue, unspecified type    3. Obesity, Class I, BMI 30.0-34.9 (see actual BMI)    4. Ptosis of eyelid, right        Plan:       Body dysmorphic disorder  Reports psychiatrist plans to sign off  with recommendation to body dysmorphic disorder specialist.    Fatigue, unspecified type  Suspect fatigue may be related to increased diet consumption of "sweets."  -     Vitamin B12; Future; Expected date: 2023  -     Vitamin D; Future; Expected date: 2023    Obesity, Class I, BMI 30.0-34.9 (see actual BMI)  Continue with exercise regimen as tolerated.    Decrease intake of "sweets."    Ptosis of eyelid, right  Managed by Ophthalmology.     Medication List with Changes/Refills   Current Medications    KETOCONAZOLE (NIZORAL) 2 % CREAM    SMARTSI Topical Daily PRN    OXYMETAZOLINE, PF, 0.1 % DPET    Place 1 drop into the right eye " once daily.   Discontinued Medications    FAMOTIDINE (PEPCID) 40 MG TABLET    Take 1 tablet (40 mg total) by mouth once daily.            Follow up if symptoms worsen or fail to improve.    I spent a total of 30 minutes on the day of the visit.This includes face to face time and non-face to face time preparing to see the patient (eg, review of tests), obtaining and/or reviewing separately obtained history, documenting clinical information in the electronic or other health record, independently interpreting results and communicating results to the patient/family/caregiver, or care coordinator.     Adriana Esparza APRN, MSN, FNP-C

## 2023-07-11 ENCOUNTER — PATIENT MESSAGE (OUTPATIENT)
Dept: PRIMARY CARE CLINIC | Facility: CLINIC | Age: 38
End: 2023-07-11
Payer: COMMERCIAL

## 2023-07-18 ENCOUNTER — OFFICE VISIT (OUTPATIENT)
Dept: PSYCHIATRY | Facility: CLINIC | Age: 38
End: 2023-07-18
Payer: COMMERCIAL

## 2023-07-18 DIAGNOSIS — F43.23 ADJUSTMENT DISORDER WITH MIXED ANXIETY AND DEPRESSED MOOD: ICD-10-CM

## 2023-07-18 DIAGNOSIS — F45.22 BODY DYSMORPHIC DISORDER: Primary | ICD-10-CM

## 2023-07-18 PROCEDURE — 90837 PSYTX W PT 60 MINUTES: CPT | Mod: S$GLB,,, | Performed by: PSYCHOLOGIST

## 2023-07-18 PROCEDURE — 90837 PR PSYCHOTHERAPY W/PATIENT, 60 MIN: ICD-10-PCS | Mod: S$GLB,,, | Performed by: PSYCHOLOGIST

## 2023-07-20 ENCOUNTER — PATIENT OUTREACH (OUTPATIENT)
Dept: ADMINISTRATIVE | Facility: OTHER | Age: 38
End: 2023-07-20
Payer: COMMERCIAL

## 2023-07-20 NOTE — PROGRESS NOTES
CHW - Initial Contact    This Community Health Worker completed the Social Determinant of Health questionnaire with patient via telephone today.    Pt identified barriers of most importance are: Patient stated that she does not need assistance at this time    Referrals to community agencies completed with patient/caregiver consent outside of [facility] include: no  Referrals were put through [facility] - no:   Support and Services: Chw will follow up in a few weeks  Other information discussed the patient needs / wants help with: no   Follow up required:   Follow-up Outreach - Due: 11/2/2023

## 2023-07-24 NOTE — PROGRESS NOTES
Individual Psychotherapy (PhD/LCSW)    7/18/2023    Site:  [facility]        Therapeutic Intervention: Met with patient.  Outpatient - Behavior modifying psychotherapy 60 min - CPT code 65990    Chief complaint/reason for encounter: depression and anxiety     Interval history and content of current session: Patient presented casually dressed, alert, and oriented. Patient reported experiencing diminished interest, insomnia, fatigue, worthlessness/guilt, poor concentration, decreased libido, social isolation, irritability and muscle tension (vaginal wall).  Patient denied current suicidal and homicidal ideations.  Explored triggers of patient's anxiety.  Patient discussed being able to connect better with her  while her daughter is away visiting her biological father.  Assisted patient in processing her feelings around her relationship with her .  Patient exhibited good insight when she stated that she notices that she is more affectionate towards her  when they are alone and away from others.  Explored reasons for the difference in patient's behavior towards her .  Patient also discussed her concerns about her daughter's well being given that her 11 year old daughter has started to make comments about wanting her biological parents to be together.  Discussed ways for patient to address this issue with her daughter in an age appropriate manner.      Treatment plan:  Target symptoms: depression, anxiety , adjustment  Why chosen therapy is appropriate versus another modality: relevant to diagnosis  Outcome monitoring methods: self-report  Therapeutic intervention type: insight oriented psychotherapy, behavior modifying psychotherapy    Risk parameters:  Patient reports no suicidal ideation  Patient reports no homicidal ideation  Patient reports no self-injurious behavior  Patient reports no violent behavior    Verbal deficits: None    Patient's response to intervention:  The patient's  response to intervention is accepting.    Progress toward goals and other mental status changes:  The patient's progress toward goals is fair .    Diagnosis:     ICD-10-CM ICD-9-CM   1. Body dysmorphic disorder  F45.22 300.7   2. Adjustment disorder with mixed anxiety and depressed mood  F43.23 309.28       Plan:  individual psychotherapy    Return to clinic: 2 weeks, 1 month    Length of Service (minutes): 60

## 2023-07-31 ENCOUNTER — PATIENT MESSAGE (OUTPATIENT)
Dept: RESEARCH | Facility: HOSPITAL | Age: 38
End: 2023-07-31
Payer: COMMERCIAL

## 2023-08-20 ENCOUNTER — TELEPHONE (OUTPATIENT)
Dept: OPTOMETRY | Facility: CLINIC | Age: 38
End: 2023-08-20
Payer: COMMERCIAL

## 2023-08-20 DIAGNOSIS — H02.401 PTOSIS OF EYELID, RIGHT: Primary | ICD-10-CM

## 2023-08-20 NOTE — TELEPHONE ENCOUNTER
Referral placed to Dr. Cortes for eval for botox for right eyelid/brow ptosis.    Laura Win, OD    ----- Message from Piper Cavanaugh sent at 8/15/2023 10:08 AM CDT -----  Regarding: Referral Inquiry  Pt called about getting a referral to another oculoplastics surgeon to get a 2nd option.     Call back- [phone]

## 2023-08-21 ENCOUNTER — PATIENT MESSAGE (OUTPATIENT)
Dept: OPHTHALMOLOGY | Facility: CLINIC | Age: 38
End: 2023-08-21
Payer: COMMERCIAL

## 2023-09-26 ENCOUNTER — OFFICE VISIT (OUTPATIENT)
Dept: PSYCHIATRY | Facility: CLINIC | Age: 38
End: 2023-09-26
Payer: COMMERCIAL

## 2023-09-26 DIAGNOSIS — F45.22 BODY DYSMORPHIC DISORDER: Primary | ICD-10-CM

## 2023-09-26 DIAGNOSIS — F43.23 ADJUSTMENT DISORDER WITH MIXED ANXIETY AND DEPRESSED MOOD: ICD-10-CM

## 2023-09-26 PROCEDURE — 90837 PR PSYCHOTHERAPY W/PATIENT, 60 MIN: ICD-10-PCS | Mod: S$GLB,,, | Performed by: PSYCHOLOGIST

## 2023-09-26 PROCEDURE — 90837 PSYTX W PT 60 MINUTES: CPT | Mod: S$GLB,,, | Performed by: PSYCHOLOGIST

## 2023-09-26 NOTE — PROGRESS NOTES
Individual Psychotherapy (PhD/LCSW)    9/26/2023    Site:  [facility]        Therapeutic Intervention: Met with patient.  Outpatient - Behavior modifying psychotherapy 60 min - CPT code 86230    Chief complaint/reason for encounter: depression and anxiety     Interval history and content of current session: Patient presented casually dressed, alert, and oriented. Patient reported experiencing diminished interest, insomnia, fatigue, worthlessness/guilt, poor concentration, decreased libido, social isolation, irritability and muscle tension (vaginal wall).  Patient denied current suicidal and homicidal ideations.  Explored triggers of patient's anxiety.  Active listening skills were used as patient described the events leading up to and after finding out that her daughter had been being bullied at school.  Patient discussed her concerns about her relationship with her daughter since her daughter may not share information with her.  Assisted patient in processing her feelings around her relationship with her daughter.  Patient also discussed her daughter's relationship with patient's .  Patient exhibited good insight when she stated that she fears going back to about two years ago when her daughter was having daily behavioral problems at school.  Assisted patient in exploring her options.      Treatment plan:  Target symptoms: depression, anxiety , adjustment  Why chosen therapy is appropriate versus another modality: relevant to diagnosis  Outcome monitoring methods: self-report  Therapeutic intervention type: insight oriented psychotherapy, behavior modifying psychotherapy    Risk parameters:  Patient reports no suicidal ideation  Patient reports no homicidal ideation  Patient reports no self-injurious behavior  Patient reports no violent behavior    Verbal deficits: None    Patient's response to intervention:  The patient's response to intervention is accepting.    Progress toward goals and other  mental status changes:  The patient's progress toward goals is fair .    Diagnosis:     ICD-10-CM ICD-9-CM   1. Body dysmorphic disorder  F45.22 300.7   2. Adjustment disorder with mixed anxiety and depressed mood  F43.23 309.28       Plan:  individual psychotherapy    Return to clinic: 2 weeks, 1 month    Length of Service (minutes): 60

## 2023-10-17 ENCOUNTER — OFFICE VISIT (OUTPATIENT)
Dept: PSYCHIATRY | Facility: CLINIC | Age: 38
End: 2023-10-17
Payer: COMMERCIAL

## 2023-10-17 DIAGNOSIS — F43.23 ADJUSTMENT DISORDER WITH MIXED ANXIETY AND DEPRESSED MOOD: ICD-10-CM

## 2023-10-17 DIAGNOSIS — F45.22 BODY DYSMORPHIC DISORDER: Primary | ICD-10-CM

## 2023-10-17 PROCEDURE — 90837 PSYTX W PT 60 MINUTES: CPT | Mod: S$GLB,,, | Performed by: PSYCHOLOGIST

## 2023-10-17 PROCEDURE — 90837 PR PSYCHOTHERAPY W/PATIENT, 60 MIN: ICD-10-PCS | Mod: S$GLB,,, | Performed by: PSYCHOLOGIST

## 2023-10-17 NOTE — PROGRESS NOTES
Individual Psychotherapy (PhD/LCSW)    10/17/2023    Site:  [facility]        Therapeutic Intervention: Met with patient.  Outpatient - Behavior modifying psychotherapy 60 min - CPT code 81760    Chief complaint/reason for encounter: depression and anxiety     Interval history and content of current session: Patient presented casually dressed, alert, and oriented. Patient reported experiencing diminished interest, insomnia, fatigue, worthlessness/guilt, poor concentration, decreased libido, social isolation, irritability and muscle tension (vaginal wall).  Patient denied current suicidal and homicidal ideations.  Explored triggers of patient's anxiety.  Patient described that she worries about issues related to family, personal safety, and health, among other things.  Active listening skills were used as patient described feeling overwhelmed due to recent tension in her household between her daughter and .  Patient discussed her concerns about the change in the relationship between her daughter and .  Patient also discussed her concerns about recent situations that her daughter has experienced at school.  Assisted patient in processing her feelings around her relationships with both her daughter and .  Educated patient about using assertive communication skills to address her concerns and feelings with others.  Patient was informed that current provider will be out of the clinic for about eight weeks starting 11/21/23.  Discussed plan for patient's care during provider's absence.        Treatment plan:  Target symptoms: depression, anxiety , adjustment  Why chosen therapy is appropriate versus another modality: relevant to diagnosis  Outcome monitoring methods: self-report  Therapeutic intervention type: insight oriented psychotherapy, behavior modifying psychotherapy    Risk parameters:  Patient reports no suicidal ideation  Patient reports no homicidal ideation  Patient reports no  self-injurious behavior  Patient reports no violent behavior    Verbal deficits: None    Patient's response to intervention:  The patient's response to intervention is accepting.    Progress toward goals and other mental status changes:  The patient's progress toward goals is fair .    Diagnosis:     ICD-10-CM ICD-9-CM   1. Body dysmorphic disorder  F45.22 300.7   2. Adjustment disorder with mixed anxiety and depressed mood  F43.23 309.28       Plan:  individual psychotherapy    Return to clinic: 2 weeks, 1 month    Length of Service (minutes): 60

## 2023-10-26 ENCOUNTER — PATIENT MESSAGE (OUTPATIENT)
Dept: PSYCHIATRY | Facility: CLINIC | Age: 38
End: 2023-10-26
Payer: COMMERCIAL

## 2023-11-06 ENCOUNTER — PATIENT MESSAGE (OUTPATIENT)
Dept: PSYCHIATRY | Facility: CLINIC | Age: 38
End: 2023-11-06
Payer: COMMERCIAL

## 2023-11-10 ENCOUNTER — PATIENT MESSAGE (OUTPATIENT)
Dept: PSYCHIATRY | Facility: CLINIC | Age: 38
End: 2023-11-10
Payer: COMMERCIAL

## 2023-12-21 ENCOUNTER — LAB VISIT (OUTPATIENT)
Dept: LAB | Facility: HOSPITAL | Age: 38
End: 2023-12-21
Attending: OPHTHALMOLOGY
Payer: MEDICAID

## 2023-12-21 ENCOUNTER — CLINICAL SUPPORT (OUTPATIENT)
Dept: OPHTHALMOLOGY | Facility: CLINIC | Age: 38
End: 2023-12-21
Payer: MEDICAID

## 2023-12-21 ENCOUNTER — OFFICE VISIT (OUTPATIENT)
Dept: OPHTHALMOLOGY | Facility: CLINIC | Age: 38
End: 2023-12-21
Payer: COMMERCIAL

## 2023-12-21 DIAGNOSIS — H02.421 ACQUIRED MYOGENIC PTOSIS OF EYELID, RIGHT: Primary | ICD-10-CM

## 2023-12-21 DIAGNOSIS — H02.421 ACQUIRED MYOGENIC PTOSIS OF EYELID, RIGHT: ICD-10-CM

## 2023-12-21 DIAGNOSIS — H57.811 BROW PTOSIS, RIGHT: ICD-10-CM

## 2023-12-21 PROCEDURE — 99999 PR PBB SHADOW E&M-EST. PATIENT-LVL I: CPT | Mod: PBBFAC,,,

## 2023-12-21 PROCEDURE — 83516 IMMUNOASSAY NONANTIBODY: CPT | Mod: 59 | Performed by: OPHTHALMOLOGY

## 2023-12-21 PROCEDURE — 83519 RIA NONANTIBODY: CPT | Mod: 59 | Performed by: OPHTHALMOLOGY

## 2023-12-21 PROCEDURE — 99999 PR PBB SHADOW E&M-EST. PATIENT-LVL III: CPT | Mod: PBBFAC,,, | Performed by: OPHTHALMOLOGY

## 2023-12-21 PROCEDURE — 92285 EXTERNAL OCULAR PHOTOGRAPHY: CPT | Mod: PBBFAC | Performed by: OPHTHALMOLOGY

## 2023-12-21 PROCEDURE — 83519 RIA NONANTIBODY: CPT | Performed by: OPHTHALMOLOGY

## 2023-12-21 PROCEDURE — 99204 OFFICE O/P NEW MOD 45 MIN: CPT | Mod: S$GLB,,, | Performed by: OPHTHALMOLOGY

## 2023-12-21 PROCEDURE — 86596 VOLTAGE-GTD CA CHNL ANTB EA: CPT | Performed by: OPHTHALMOLOGY

## 2023-12-21 PROCEDURE — 99213 OFFICE O/P EST LOW 20 MIN: CPT | Mod: PBBFAC | Performed by: OPHTHALMOLOGY

## 2023-12-21 PROCEDURE — 36415 COLL VENOUS BLD VENIPUNCTURE: CPT | Performed by: OPHTHALMOLOGY

## 2023-12-21 PROCEDURE — 92083 EXTENDED VISUAL FIELD XM: CPT | Mod: PBBFAC | Performed by: OPHTHALMOLOGY

## 2023-12-21 PROCEDURE — 99999 PR PBB SHADOW E&M-EST. PATIENT-LVL I: ICD-10-PCS | Mod: PBBFAC,,,

## 2023-12-21 PROCEDURE — 99211 OFF/OP EST MAY X REQ PHY/QHP: CPT | Mod: PBBFAC,25

## 2023-12-21 NOTE — PROGRESS NOTES
HPI    Carmnia Hernandez is a/an 37 y.o. female here for ptosis.  Referred by: Dr. Win  How long have eyelid(s) been droopy? About 2-3 years   Any h/o wearing hard CLs? No  Do eyelids feel heavy? Yes  Does the height of the eyelid(s) fluctuate throughout the day? Yes  Do eyelid(s) interfere with daily activities such as driving, reading,   working? No  Spontaneous orbital pain? Yes  Orbital pain w eye movement? Yes  Red eyes? No  Red eyelids? No  Eyelid swelling? No      EYE GTTS: None   Pt has tried BOTOX injections in the past for issue but it did not help   issue  Pt was also recommended upneeq as well.       Last edited by Dominique Hebert on 12/21/2023 10:32 AM.            Assessment /Plan     For exam results, see Encounter Report.    Acquired myogenic ptosis of eyelid, right  -     External Photography - OU - Both Eyes  -     GOLDMANN PERIMETRY - OU - EXTENDED - BOTH EYES  -     Ambulatory referral/consult to Ophthalmology  -     Myasthenia Gravis/Lambert-Eaton; Future; Expected date: 12/21/2023  -     ACETYLCHOLINE RECEPTOR, BINDING; Future; Expected date: 12/21/2023  -     Acetylcholine receptor, modulating; Future; Expected date: 12/21/2023  -     MUSK ANTIBODY TEST; Future; Expected date: 12/21/2023  -     Acetylcholine receptor, blocking; Future; Expected date: 12/21/2023    Brow ptosis, right      The patient is a pleasant 37-year-old female here for evaluation of right-sided ptosis.  The patient is having headaches on a continuous basis secondary to the heaviness on the right side.  This has been constant and progressive over the last 3 years.  The patient does not have any prior history of eyelid surgery or eyelid or facial trauma.  She does have a remote history of botulinum toxin injection to her forehead.  The patient has also tried upneeq with limited improvement on the right side in regard to heaviness.  She denies any prior history of Bell's palsy or any other systemic illnesses.    On exam, the  patient has a slight chin-up head position.  She has right brow elevation.  She has right upper eyelid acquired myogenic ptosis.  She has minimal dermatochalasis of the bilateral upper eyelids and bilateral lower eyelids. There is no preauricular or submandibular adenopathy.      Pt. Without significant improvement of superior visual fields with lids and brows taped vs. untaped.    These findings were discussed with the patient.  We discussed the option mm CR versus chemical brow lift on the right side to paralyze the protractors and allow frontalis elevation.  We also discussed returning for repeat phenylephrine evaluation for the right side only.    Recommend obtaining baseline myasthenia panel.    Return in 3-4 weeks sooner any worsening for right-sided phenylephrine test and further discussion.

## 2023-12-21 NOTE — PROGRESS NOTES
Ptosis GVF/HVF done ou./ rel/fix/coop. Good ou./ chart checked for latex allergy.-[facility]

## 2023-12-23 LAB
ACHR BLOCK AB/ACHR TOTAL SFR SER: 9 % (ref 0–26)
ACHR MOD AB/ACHR TOTAL SFR SER: 1 %
MUSK ANTIBODY TEST: 0 NMOL/L (ref 0–0.02)

## 2023-12-27 LAB
ACHR BIND AB SER-SCNC: 0 NMOL/L
ACHR BIND AB SER-SCNC: 0 NMOL/L
VGCC-N BIND AB SER-SCNC: NORMAL PMOL/L
VGCC-P/Q BIND AB SER-SCNC: 0 NMOL/L

## 2024-01-25 ENCOUNTER — OFFICE VISIT (OUTPATIENT)
Dept: OPHTHALMOLOGY | Facility: CLINIC | Age: 39
End: 2024-01-25
Payer: COMMERCIAL

## 2024-01-25 DIAGNOSIS — R51.9 CHRONIC NONINTRACTABLE HEADACHE, UNSPECIFIED HEADACHE TYPE: ICD-10-CM

## 2024-01-25 DIAGNOSIS — Z82.0 FAMILY HISTORY OF MIGRAINE HEADACHES IN FATHER: ICD-10-CM

## 2024-01-25 DIAGNOSIS — G89.29 CHRONIC NONINTRACTABLE HEADACHE, UNSPECIFIED HEADACHE TYPE: ICD-10-CM

## 2024-01-25 DIAGNOSIS — H57.811 BROW PTOSIS, RIGHT: Primary | ICD-10-CM

## 2024-01-25 PROCEDURE — 99212 OFFICE O/P EST SF 10 MIN: CPT | Mod: PBBFAC | Performed by: OPHTHALMOLOGY

## 2024-01-25 PROCEDURE — 99999 PR PBB SHADOW E&M-EST. PATIENT-LVL II: CPT | Mod: PBBFAC,,, | Performed by: OPHTHALMOLOGY

## 2024-01-25 NOTE — PROGRESS NOTES
HPI    Carmina Hernandez is a/an 37 y.o. female here for ptosis.  Referred by: Dr. Win  How long have eyelid(s) been droopy? About 2-3 years   Any h/o wearing hard CLs? No  Do eyelids feel heavy? Yes  Does the height of the eyelid(s) fluctuate throughout the day? Yes  Do eyelid(s) interfere with daily activities such as driving, reading,   working? No  Spontaneous orbital pain? Yes  Orbital pain w eye movement? Yes  Red eyes? No  Red eyelids? No  Eyelid swelling? No      EYE GTTS: None   Pt has tried BOTOX injections in the past for issue but it did not help   issue  Pt was also recommended upneeq as well.       Last edited by Dominique Hebert on 1/25/2024 10:42 AM.            Assessment /Plan     For exam results, see Encounter Report.    Brow ptosis, right    Family history of migraine headaches in father    Chronic nonintractable headache, unspecified headache type      The patient is a pleasant 38-year-old female here for follow-up evaluation of right brow ptosis and right upper eyelid acquired myogenic ptosis.  The patient continues to have daily headache in the region of the glabella by the end of the day on a daily basis.  Continues to feel that she needs to elevate the right brow for symmetric appearance of the bilateral upper eyelids.  The patient's father does have a history of migraine headaches.      On exam, the patient continues to have mild right brow ptosis.  She has symmetric MRD 1 of the bilateral upper eyelids with the brow elevation.    The patient had a phenylephrine test on the right side only today with a mildly positive result.  This is consistent with the patient's prior history of using upneeq without significant improvement.      These findings were discussed with the patient.  We discussed the option of chemical brow lift with botulinum toxin in the future; however, this would be need to be titrated and would likely cause initially a chronic elevation of the right brow and a visually  "significant manner.     The patient continues to be bothered by the chronic daily headaches.  With her family history of migraine headaches, recommend consultation with headache specialist.    After appointment with headache specialist, consider botulinum toxin to the sub brow orbicularis in a very measured manner.  The patient understands that this may cause "spocking" of the eyebrows.  Defect of the botulinum toxin typically lasts for 3 months in most patients.      Return after consultation with headache specialist.                    "

## 2024-01-26 ENCOUNTER — TELEPHONE (OUTPATIENT)
Dept: NEUROLOGY | Facility: CLINIC | Age: 39
End: 2024-01-26
Payer: COMMERCIAL

## 2024-01-26 NOTE — TELEPHONE ENCOUNTER
----- Message from Cindy Saravia sent at 1/26/2024  2:48 PM CST -----  Got her in on 3.18. I called and confirmed. Gave her direction to the clinic, etc.   ----- Message -----  From: Nathan Wallace MA  Sent: 1/26/2024   2:21 PM CST  To: Cindy Saravia    Can you schedule this patient with next resident clinic that Dr. Carrington' staffing?  ----- Message -----  From: Kristen Riojas PA-C  Sent: 1/26/2024  11:12 AM CST  To: Nathan Wallace MA    What's Dr. Carrington's soonest available. Bc I'd recommend him if its possible.   ----- Message -----  From: Nathan Wallace MA  Sent: 1/25/2024   3:45 PM CST  To: Kristen Riojas PA-C    Review opthalmology note, okay to see? Nothing sooner available for Dr. Carrington.  ----- Message -----  From: Ember Benavidez  Sent: 1/25/2024  12:32 PM CST  To: [facility] Staff    Dr. Cortes is referring this pt for headaches. Please call pt for an appointment.

## 2024-03-04 ENCOUNTER — OFFICE VISIT (OUTPATIENT)
Dept: FAMILY MEDICINE | Facility: CLINIC | Age: 39
End: 2024-03-04
Payer: COMMERCIAL

## 2024-03-04 ENCOUNTER — LAB VISIT (OUTPATIENT)
Dept: LAB | Facility: HOSPITAL | Age: 39
End: 2024-03-04
Payer: COMMERCIAL

## 2024-03-04 VITALS
TEMPERATURE: 98 F | BODY MASS INDEX: 31.45 KG/M2 | HEIGHT: 62 IN | WEIGHT: 170.88 LBS | HEART RATE: 78 BPM | SYSTOLIC BLOOD PRESSURE: 112 MMHG | OXYGEN SATURATION: 97 % | DIASTOLIC BLOOD PRESSURE: 72 MMHG

## 2024-03-04 DIAGNOSIS — F43.23 ADJUSTMENT DISORDER WITH MIXED ANXIETY AND DEPRESSED MOOD: ICD-10-CM

## 2024-03-04 DIAGNOSIS — Z00.00 ENCOUNTER FOR ANNUAL GENERAL MEDICAL EXAMINATION WITHOUT ABNORMAL FINDINGS IN ADULT: ICD-10-CM

## 2024-03-04 DIAGNOSIS — Z12.4 ENCOUNTER FOR SCREENING FOR CERVICAL CANCER: ICD-10-CM

## 2024-03-04 DIAGNOSIS — Z76.89 ENCOUNTER TO ESTABLISH CARE: ICD-10-CM

## 2024-03-04 DIAGNOSIS — R53.83 FATIGUE, UNSPECIFIED TYPE: ICD-10-CM

## 2024-03-04 DIAGNOSIS — E66.9 CLASS 1 OBESITY WITH BODY MASS INDEX (BMI) OF 31.0 TO 31.9 IN ADULT, UNSPECIFIED OBESITY TYPE, UNSPECIFIED WHETHER SERIOUS COMORBIDITY PRESENT: ICD-10-CM

## 2024-03-04 DIAGNOSIS — N92.0 MENORRHAGIA WITH REGULAR CYCLE: ICD-10-CM

## 2024-03-04 DIAGNOSIS — Z00.00 ENCOUNTER FOR ANNUAL GENERAL MEDICAL EXAMINATION WITHOUT ABNORMAL FINDINGS IN ADULT: Primary | ICD-10-CM

## 2024-03-04 LAB
25(OH)D3+25(OH)D2 SERPL-MCNC: 44 NG/ML (ref 30–96)
ALBUMIN SERPL BCP-MCNC: 4 G/DL (ref 3.5–5.2)
ALP SERPL-CCNC: 48 U/L (ref 55–135)
ALT SERPL W/O P-5'-P-CCNC: 22 U/L (ref 10–44)
ANION GAP SERPL CALC-SCNC: 9 MMOL/L (ref 8–16)
AST SERPL-CCNC: 21 U/L (ref 10–40)
BILIRUB SERPL-MCNC: 0.2 MG/DL (ref 0.1–1)
BUN SERPL-MCNC: 17 MG/DL (ref 6–20)
CALCIUM SERPL-MCNC: 9.5 MG/DL (ref 8.7–10.5)
CHLORIDE SERPL-SCNC: 104 MMOL/L (ref 95–110)
CHOLEST SERPL-MCNC: 127 MG/DL (ref 120–199)
CHOLEST/HDLC SERPL: 2.3 {RATIO} (ref 2–5)
CO2 SERPL-SCNC: 24 MMOL/L (ref 23–29)
CREAT SERPL-MCNC: 0.8 MG/DL (ref 0.5–1.4)
ERYTHROCYTE [DISTWIDTH] IN BLOOD BY AUTOMATED COUNT: 12.6 % (ref 11.5–14.5)
EST. GFR  (NO RACE VARIABLE): >60 ML/MIN/1.73 M^2
ESTIMATED AVG GLUCOSE: 105 MG/DL (ref 68–131)
FERRITIN SERPL-MCNC: 19 NG/ML (ref 20–300)
GLUCOSE SERPL-MCNC: 89 MG/DL (ref 70–110)
HBA1C MFR BLD: 5.3 % (ref 4–5.6)
HCT VFR BLD AUTO: 34.9 % (ref 37–48.5)
HDLC SERPL-MCNC: 56 MG/DL (ref 40–75)
HDLC SERPL: 44.1 % (ref 20–50)
HGB BLD-MCNC: 11.8 G/DL (ref 12–16)
IRON SERPL-MCNC: 83 UG/DL (ref 30–160)
LDLC SERPL CALC-MCNC: 61.8 MG/DL (ref 63–159)
MCH RBC QN AUTO: 30.9 PG (ref 27–31)
MCHC RBC AUTO-ENTMCNC: 33.8 G/DL (ref 32–36)
MCV RBC AUTO: 91 FL (ref 82–98)
NONHDLC SERPL-MCNC: 71 MG/DL
PLATELET # BLD AUTO: 218 K/UL (ref 150–450)
PMV BLD AUTO: 10.5 FL (ref 9.2–12.9)
POTASSIUM SERPL-SCNC: 4.5 MMOL/L (ref 3.5–5.1)
PROT SERPL-MCNC: 7.1 G/DL (ref 6–8.4)
RBC # BLD AUTO: 3.82 M/UL (ref 4–5.4)
SATURATED IRON: 26 % (ref 20–50)
SODIUM SERPL-SCNC: 137 MMOL/L (ref 136–145)
TOTAL IRON BINDING CAPACITY: 320 UG/DL (ref 250–450)
TRANSFERRIN SERPL-MCNC: 216 MG/DL (ref 200–375)
TRANSFERRIN SERPL-MCNC: 216 MG/DL (ref 200–375)
TRIGL SERPL-MCNC: 46 MG/DL (ref 30–150)
TSH SERPL DL<=0.005 MIU/L-ACNC: 0.57 UIU/ML (ref 0.4–4)
VIT B12 SERPL-MCNC: 1437 PG/ML (ref 210–950)
WBC # BLD AUTO: 6.42 K/UL (ref 3.9–12.7)

## 2024-03-04 PROCEDURE — 87591 N.GONORRHOEAE DNA AMP PROB: CPT | Performed by: STUDENT IN AN ORGANIZED HEALTH CARE EDUCATION/TRAINING PROGRAM

## 2024-03-04 PROCEDURE — 82306 VITAMIN D 25 HYDROXY: CPT | Performed by: NURSE PRACTITIONER

## 2024-03-04 PROCEDURE — 80053 COMPREHEN METABOLIC PANEL: CPT | Performed by: STUDENT IN AN ORGANIZED HEALTH CARE EDUCATION/TRAINING PROGRAM

## 2024-03-04 PROCEDURE — 84443 ASSAY THYROID STIM HORMONE: CPT | Performed by: STUDENT IN AN ORGANIZED HEALTH CARE EDUCATION/TRAINING PROGRAM

## 2024-03-04 PROCEDURE — 82607 VITAMIN B-12: CPT | Performed by: NURSE PRACTITIONER

## 2024-03-04 PROCEDURE — 83540 ASSAY OF IRON: CPT | Performed by: STUDENT IN AN ORGANIZED HEALTH CARE EDUCATION/TRAINING PROGRAM

## 2024-03-04 PROCEDURE — 82728 ASSAY OF FERRITIN: CPT | Performed by: STUDENT IN AN ORGANIZED HEALTH CARE EDUCATION/TRAINING PROGRAM

## 2024-03-04 PROCEDURE — 85027 COMPLETE CBC AUTOMATED: CPT | Performed by: STUDENT IN AN ORGANIZED HEALTH CARE EDUCATION/TRAINING PROGRAM

## 2024-03-04 PROCEDURE — 87491 CHLMYD TRACH DNA AMP PROBE: CPT | Performed by: STUDENT IN AN ORGANIZED HEALTH CARE EDUCATION/TRAINING PROGRAM

## 2024-03-04 PROCEDURE — 99999 PR PBB SHADOW E&M-EST. PATIENT-LVL IV: CPT | Mod: PBBFAC,,, | Performed by: STUDENT IN AN ORGANIZED HEALTH CARE EDUCATION/TRAINING PROGRAM

## 2024-03-04 PROCEDURE — 99214 OFFICE O/P EST MOD 30 MIN: CPT | Mod: S$PBB,,, | Performed by: STUDENT IN AN ORGANIZED HEALTH CARE EDUCATION/TRAINING PROGRAM

## 2024-03-04 PROCEDURE — 36415 COLL VENOUS BLD VENIPUNCTURE: CPT | Mod: PO | Performed by: NURSE PRACTITIONER

## 2024-03-04 PROCEDURE — 83036 HEMOGLOBIN GLYCOSYLATED A1C: CPT | Performed by: STUDENT IN AN ORGANIZED HEALTH CARE EDUCATION/TRAINING PROGRAM

## 2024-03-04 PROCEDURE — 80061 LIPID PANEL: CPT | Performed by: STUDENT IN AN ORGANIZED HEALTH CARE EDUCATION/TRAINING PROGRAM

## 2024-03-04 PROCEDURE — 99214 OFFICE O/P EST MOD 30 MIN: CPT | Mod: PBBFAC,PO | Performed by: STUDENT IN AN ORGANIZED HEALTH CARE EDUCATION/TRAINING PROGRAM

## 2024-03-04 NOTE — PATIENT INSTRUCTIONS
Dejon Grewal,     If you are due for any health screening(s) below please notify me so we can arrange them to be ordered and scheduled to maintain your health. Most healthy patients complete it. Don't lose out on improving your health.     All of your core healthy metrics are met.              Dear Ms. Hernandez:    Your [facility] Care Team is dedicated to helping you stay healthy with regularly scheduled recommended screenings.  Scheduling routine screenings is important to maintaining good health. Our records indicate that you may be overdue for your screening pap smear. A pap smear screening can help identify patients at risk for developing cervical cancer at an early stage, when it is most likely to be successfully treated.    We encourage you to schedule your appointment with your [facility] provider or some primary care providers also perform this screening.    If you have completed or scheduled your pap smear screening outside of [facility], please notify your primary care team so we can update your health record.      If you have questions or would like to schedule your screening, please contact your primary care clinic.    Sincerely,    Your [facility] Primary Care Team

## 2024-03-04 NOTE — PROGRESS NOTES
SUBJECTIVE:    CHIEF COMPLAINT:   Chief Complaint   Patient presents with    Establish Care     Fatigue and heavy menstrual             274}    HISTORY OF PRESENT ILLNESS:  Carmina Hernandez is a 38 y.o. female with no significant past medical history who presents to the clinic to [facility] she reports ongoing fatigue for many months.  She admits she often has heavy menstrual cycles, but does not report prolonged menstrual periods beyond 5 days.   She denies any chest pain, shortness of breath, abdominal pain, fevers, chills, nausea, vomiting, diarrhea or dysuria.       Last pap smear: n/a        PAST MEDICAL HISTORY:     274}  History reviewed. No pertinent past medical history.    PAST SURGICAL HISTORY:  Past Surgical History:   Procedure Laterality Date    KELOID EXCISION  2010    chest       SOCIAL HISTORY:  Social History     Socioeconomic History    Marital status: Single   Tobacco Use    Smoking status: Never    Smokeless tobacco: Never   Substance and Sexual Activity    Alcohol use: Not Currently    Drug use: Never    Sexual activity: Yes     Partners: Male     Birth control/protection: None     Social Determinants of Health     Financial Resource Strain: Low Risk  (7/20/2023)    Overall Financial Resource Strain (CARDIA)     Difficulty of Paying Living Expenses: Not very hard   Food Insecurity: No Food Insecurity (7/20/2023)    Hunger Vital Sign     Worried About Running Out of Food in the Last Year: Never true     Ran Out of Food in the Last Year: Never true   Transportation Needs: No Transportation Needs (7/20/2023)    PRAPARE - Transportation     Lack of Transportation (Medical): No     Lack of Transportation (Non-Medical): No   Physical Activity: Sufficiently Active (7/20/2023)    Exercise Vital Sign     Days of Exercise per Week: 7 days     Minutes of Exercise per Session: 110 min   Stress: No Stress Concern Present (7/20/2023)    [employer] - Occupational Stress  Questionnaire     Feeling of Stress : Not at all   Social Connections: Socially Isolated (2023)    Social Connection and Isolation Panel [NHANES]     Frequency of Communication with Friends and Family: More than three times a week     Frequency of Social Gatherings with Friends and Family: More than three times a week     Attends Shinto Services: Never     Active Member of Clubs or Organizations: No     Attends Club or Organization Meetings: Never     Marital Status: Never    Housing Stability: Unknown (2023)    Housing Stability Vital Sign     Unable to Pay for Housing in the Last Year: No     Unstable Housing in the Last Year: No       FAMILY HISTORY:       Family History   Problem Relation Age of Onset    Dementia Mother     Hypertension Father     No Known Problems Daughter     Breast cancer Maternal Aunt     Cancer Neg Hx     Colon cancer Neg Hx     Diabetes Neg Hx     Eclampsia Neg Hx     Miscarriages / Stillbirths Neg Hx     Ovarian cancer Neg Hx      labor Neg Hx     Stroke Neg Hx     Glaucoma Neg Hx     Macular degeneration Neg Hx     Retinal detachment Neg Hx        ALLERGIES AND MEDICATIONS: updated and reviewed.      274}  Review of patient's allergies indicates:   Allergen Reactions    Sulfa (sulfonamide antibiotics)      Medication List with Changes/Refills   Discontinued Medications    KETOCONAZOLE (NIZORAL) 2 % CREAM    SMARTSI Topical Daily PRN    OXYMETAZOLINE, PF, 0.1 % DPET    Place 1 drop into the right eye once daily.       SCREENING HISTORY:    274}  Health Maintenance         Date Due Completion Date    Influenza Vaccine (1) 2023 10/20/2022    COVID-19 Vaccine ( season) 2023 3/19/2021    Cervical Cancer Screening 2025    Hemoglobin A1c (Diabetic Prevention Screening) 2025    TETANUS VACCINE 08/15/2029 8/15/2019            REVIEW OF SYSTEMS:   Review of Systems   All other systems reviewed and are  "negative.      PHYSICAL EXAM:      274}  /72 (BP Location: Right arm, Patient Position: Sitting, BP Method: Small (Manual))   Pulse 78   Temp 97.7 °F (36.5 °C)   Ht 5' 2" (1.575 m)   Wt 77.5 kg (170 lb 13.7 oz)   SpO2 97%   BMI 31.25 kg/m²   Wt Readings from Last 3 Encounters:   03/04/24 77.5 kg (170 lb 13.7 oz)   07/07/23 74.9 kg (165 lb 2 oz)   04/28/23 76.1 kg (167 lb 14.1 oz)     BP Readings from Last 3 Encounters:   03/04/24 112/72   07/07/23 106/63   04/28/23 103/66     Estimated body mass index is 31.25 kg/m² as calculated from the following:    Height as of this encounter: 5' 2" (1.575 m).    Weight as of this encounter: 77.5 kg (170 lb 13.7 oz).     Physical Exam  Vitals reviewed.   Constitutional:       General: She is not in acute distress.     Appearance: Normal appearance. She is well-developed. She is obese. She is not ill-appearing.   HENT:      Head: Normocephalic and atraumatic.      Right Ear: External ear normal.      Left Ear: External ear normal.      Nose: Nose normal.   Eyes:      Extraocular Movements: Extraocular movements intact.      Conjunctiva/sclera: Conjunctivae normal.      Pupils: Pupils are equal, round, and reactive to light.   Neck:      Thyroid: No thyroid mass.   Cardiovascular:      Rate and Rhythm: Normal rate and regular rhythm.      Pulses: Normal pulses.      Heart sounds: Normal heart sounds, S1 normal and S2 normal. No murmur heard.     No gallop.   Pulmonary:      Effort: Pulmonary effort is normal. No respiratory distress.      Breath sounds: Normal breath sounds and air entry. No stridor. No wheezing, rhonchi or rales.   Abdominal:      General: Bowel sounds are normal. There is no distension.      Palpations: Abdomen is soft. There is no mass.      Tenderness: There is no abdominal tenderness.   Musculoskeletal:         General: No swelling or deformity. Normal range of motion.      Cervical back: Normal range of motion and neck supple. No edema or " erythema.   Skin:     General: Skin is warm and dry.      Findings: No lesion or rash.   Neurological:      General: No focal deficit present.      Mental Status: She is alert and oriented to person, place, and time. Mental status is at baseline.   Psychiatric:         Mood and Affect: Mood normal.         Behavior: Behavior normal. Behavior is cooperative.         Judgment: Judgment normal.         LABS:   274}  I have reviewed old labs below:  Lab Results   Component Value Date    WBC 7.31 02/06/2023    HGB 12.2 02/06/2023    HCT 37.3 02/06/2023    MCV 94 02/06/2023     02/06/2023     02/06/2023    K 4.2 02/06/2023     02/06/2023    CALCIUM 9.5 02/06/2023    CO2 21 (L) 02/06/2023    GLU 66 (L) 02/06/2023    BUN 17 02/06/2023    CREATININE 0.8 02/06/2023    ANIONGAP 12 02/06/2023    PROT 7.4 02/06/2023    ALBUMIN 4.1 02/06/2023    BILITOT 0.3 02/06/2023    ALKPHOS 59 02/06/2023    ALT 35 02/06/2023    AST 43 (H) 02/06/2023    CHOL 134 09/26/2022    TRIG 76 09/26/2022    HDL 65 09/26/2022    LDLCALC 53.8 (L) 09/26/2022    TSH 0.953 02/06/2023    HGBA1C 5.2 09/26/2022       ASSESSMENT AND PLAN:  274}  1. Encounter for annual general medical examination without abnormal findings in adult  -     TSH; Future; Expected date: 03/04/2024  -     Lipid Panel; Future; Expected date: 03/04/2024  -     Hemoglobin A1C; Future; Expected date: 03/04/2024  -     Comprehensive Metabolic Panel; Future; Expected date: 03/04/2024  -     CBC Without Differential; Future; Expected date: 03/04/2024  -     Ferritin; Future; Expected date: 03/04/2024  -     C. trachomatis/N. gonorrhoeae by AMP DNA [facility]; Urine    2. Encounter to establish care    3. Menorrhagia with regular cycle  Comments:  Check CBC, referral to gyn for Pap smear and offering of options for control of menstrual bleeding    4. Encounter for screening for cervical cancer  -     Ambulatory referral/consult to Obstetrics / Gynecology; Future; Expected  date: 03/11/2024    5. Adjustment disorder with mixed anxiety and depressed mood    6. Class 1 obesity with body mass index (BMI) of 31.0 to 31.9 in adult, unspecified obesity type, unspecified whether serious comorbidity present  -     Ambulatory Referral/Consult to Lifestyle Nutrition; Future; Expected date: 03/11/2024    7. Fatigue, unspecified type  -     IRON AND TIBC; Future; Expected date: 03/04/2024  -     Transferrin; Future; Expected date: 03/04/2024           Orders Placed This Encounter   Procedures    C. trachomatis/N. gonorrhoeae by AMP DNA [facility]; Urine    TSH    Lipid Panel    Hemoglobin A1C    Comprehensive Metabolic Panel    CBC Without Differential    Ferritin    IRON AND TIBC    Transferrin    Ambulatory referral/consult to Obstetrics / Gynecology    Ambulatory Referral/Consult to Lifestyle Nutrition       Follow up in about 3 months (around 6/4/2024). or sooner as needed.

## 2024-03-05 LAB
C TRACH DNA SPEC QL NAA+PROBE: NOT DETECTED
N GONORRHOEA DNA SPEC QL NAA+PROBE: NOT DETECTED

## 2024-03-12 ENCOUNTER — OFFICE VISIT (OUTPATIENT)
Dept: OBSTETRICS AND GYNECOLOGY | Facility: CLINIC | Age: 39
End: 2024-03-12
Payer: COMMERCIAL

## 2024-03-12 VITALS
WEIGHT: 169.75 LBS | HEIGHT: 62 IN | DIASTOLIC BLOOD PRESSURE: 55 MMHG | BODY MASS INDEX: 31.24 KG/M2 | SYSTOLIC BLOOD PRESSURE: 118 MMHG | HEART RATE: 73 BPM

## 2024-03-12 DIAGNOSIS — Z86.018 HISTORY OF UTERINE FIBROID: ICD-10-CM

## 2024-03-12 DIAGNOSIS — Z01.419 WELL WOMAN EXAM WITH ROUTINE GYNECOLOGICAL EXAM: Primary | ICD-10-CM

## 2024-03-12 DIAGNOSIS — Z12.4 ENCOUNTER FOR SCREENING FOR CERVICAL CANCER: ICD-10-CM

## 2024-03-12 PROCEDURE — 1160F RVW MEDS BY RX/DR IN RCRD: CPT | Mod: CPTII,S$GLB,, | Performed by: STUDENT IN AN ORGANIZED HEALTH CARE EDUCATION/TRAINING PROGRAM

## 2024-03-12 PROCEDURE — 99395 PREV VISIT EST AGE 18-39: CPT | Mod: S$GLB,,, | Performed by: STUDENT IN AN ORGANIZED HEALTH CARE EDUCATION/TRAINING PROGRAM

## 2024-03-12 PROCEDURE — 3008F BODY MASS INDEX DOCD: CPT | Mod: CPTII,S$GLB,, | Performed by: STUDENT IN AN ORGANIZED HEALTH CARE EDUCATION/TRAINING PROGRAM

## 2024-03-12 PROCEDURE — 3044F HG A1C LEVEL LT 7.0%: CPT | Mod: CPTII,S$GLB,, | Performed by: STUDENT IN AN ORGANIZED HEALTH CARE EDUCATION/TRAINING PROGRAM

## 2024-03-12 PROCEDURE — 99459 PELVIC EXAMINATION: CPT | Mod: S$GLB,,, | Performed by: STUDENT IN AN ORGANIZED HEALTH CARE EDUCATION/TRAINING PROGRAM

## 2024-03-12 PROCEDURE — 3078F DIAST BP <80 MM HG: CPT | Mod: CPTII,S$GLB,, | Performed by: STUDENT IN AN ORGANIZED HEALTH CARE EDUCATION/TRAINING PROGRAM

## 2024-03-12 PROCEDURE — 99999 PR PBB SHADOW E&M-EST. PATIENT-LVL III: CPT | Mod: PBBFAC,,, | Performed by: STUDENT IN AN ORGANIZED HEALTH CARE EDUCATION/TRAINING PROGRAM

## 2024-03-12 PROCEDURE — 88175 CYTOPATH C/V AUTO FLUID REDO: CPT | Performed by: STUDENT IN AN ORGANIZED HEALTH CARE EDUCATION/TRAINING PROGRAM

## 2024-03-12 PROCEDURE — 1159F MED LIST DOCD IN RCRD: CPT | Mod: CPTII,S$GLB,, | Performed by: STUDENT IN AN ORGANIZED HEALTH CARE EDUCATION/TRAINING PROGRAM

## 2024-03-12 PROCEDURE — 3074F SYST BP LT 130 MM HG: CPT | Mod: CPTII,S$GLB,, | Performed by: STUDENT IN AN ORGANIZED HEALTH CARE EDUCATION/TRAINING PROGRAM

## 2024-03-12 PROCEDURE — 87624 HPV HI-RISK TYP POOLED RSLT: CPT | Performed by: STUDENT IN AN ORGANIZED HEALTH CARE EDUCATION/TRAINING PROGRAM

## 2024-03-12 PROCEDURE — 99459 PELVIC EXAMINATION: CPT | Mod: PBBFAC,PO | Performed by: STUDENT IN AN ORGANIZED HEALTH CARE EDUCATION/TRAINING PROGRAM

## 2024-03-12 PROCEDURE — 99213 OFFICE O/P EST LOW 20 MIN: CPT | Mod: PBBFAC,PO | Performed by: STUDENT IN AN ORGANIZED HEALTH CARE EDUCATION/TRAINING PROGRAM

## 2024-03-12 NOTE — PROGRESS NOTES
[facility]    Subjective:     Chief Complaint:   Chief Complaint   Patient presents with    Annual Exam       Patient's last menstrual period was Patient's last menstrual period was 2024.  Contraception: Vasectomy.    2024    Carmina Hernandez is a 38 y.o. female  who presents for an annual exam.  She does not want STD screening.  She participates in regular exercise: yes.  She does not smoke or vape.  She is taking a prenatal.     Menstrual cycles occur monthly lasting 6 days with heavy flow for the past 6-8 months. No significant life changes or stressors.  She reports severe cramping during the first 2 days of her cycle. She takes ibuprofen for the cramps.  She had a pelvic ultrasound perform to evaluate pain with sex.  US showed 3 small fibroids.  She tried pelvic floor PT but reports this did not improve her symptoms.  She reports taking OCP in her teenage years. She is not currently interested in hormonal treatments for her bleeding.     She still has pain with sex located in the pelvis. Denies any bleeding after sex.     History of STI: never.     Last Pap: 2020. Results: negative for lesions or malignancy. Denies any history of abnormal pap smears.  Gardasil series: declined.     FH:  Breast cancer: maternal aunt.  Colon cancer: none.  Endometrial cancer: none.  Ovarian cancer: none.  Cervical cancer: none.       OB History    Para Term  AB Living   1 1 1     1   SAB IAB Ectopic Multiple Live Births           1      # Outcome Date GA Lbr Cristopher/2nd Weight Sex Delivery Anes PTL Lv   1 Term 12 39w0d   F Vag-Spont EPI N TERRY       History reviewed. No pertinent past medical history.  Past Surgical History:   Procedure Laterality Date    KELOID EXCISION      chest     Review of patient's allergies indicates:   Allergen Reactions    Sulfa (sulfonamide antibiotics)        Social History     Tobacco Use    Smoking status: Never    Smokeless tobacco:  "Never   Substance Use Topics    Alcohol use: Not Currently    Drug use: Never       Family History   Problem Relation Age of Onset    Dementia Mother     Hypertension Father     No Known Problems Daughter     Breast cancer Maternal Aunt     Cancer Neg Hx     Colon cancer Neg Hx     Diabetes Neg Hx     Eclampsia Neg Hx     Miscarriages / Stillbirths Neg Hx     Ovarian cancer Neg Hx      labor Neg Hx     Stroke Neg Hx     Glaucoma Neg Hx     Macular degeneration Neg Hx     Retinal detachment Neg Hx        Medications  No current outpatient medications on file prior to visit.       Review of Systems   Constitutional: Negative for appetite change, fever and unexpected weight change.   Respiratory: Negative for cough and shortness of breath.    Cardiovascular: Negative for chest pain and palpitations.   Genitourinary:         GYN ROS per HPI.   Psychiatric/Behavioral: The patient is not nervous/anxious.      Objective:     BP (!) 118/55 (BP Location: Right arm, Patient Position: Sitting, BP Method: Medium (Automatic))   Pulse 73   Ht 5' 2" (1.575 m)   Wt 77 kg (169 lb 12.1 oz)   LMP 2024   BMI 31.05 kg/m²     Physical Exam  Vitals reviewed. Chaperone present: Female chaperone present.   Constitutional:       General: She is not in acute distress.  HENT:      Head: Normocephalic.   Pulmonary:      Effort: Pulmonary effort is normal. No respiratory distress.   Chest:   Breasts:     Right: No bleeding, mass, nipple discharge, skin change or tenderness.      Left: No bleeding, mass, nipple discharge, skin change or tenderness.   Abdominal:      General: Abdomen is flat.      Palpations: Abdomen is soft.   Genitourinary:     Pubic Area: No rash.       Labia:         Right: No tenderness or lesion.         Left: No tenderness or lesion.       Vagina: No tenderness or prolapsed vaginal walls.      Cervix: No cervical motion tenderness.      Uterus: Enlarged (12-13 weeks). Not tender.       Adnexa:         " Right: No mass or tenderness.          Left: No mass or tenderness.        Comments: No uterine masses palpated.   Lymphadenopathy:      Upper Body:      Right upper body: No axillary or pectoral adenopathy.      Left upper body: No axillary or pectoral adenopathy.   Skin:     Findings: No rash.   Neurological:      General: No focal deficit present.      Mental Status: She is alert.   Psychiatric:         Mood and Affect: Mood normal.         Behavior: Behavior normal.         Assessment:     1. Well woman exam with routine gynecological exam    2. Encounter for screening for cervical cancer    3. History of uterine fibroid      Plan:     1. Well woman exam with routine gynecological exam    2. Encounter for screening for cervical cancer  - Ambulatory referral/consult to Obstetrics / Gynecology  - Liquid-Based Pap Smear, Screening  - HPV High Risk Genotypes, PCR    3. History of uterine fibroid  - US Pelvis Comp with Transvag NON-OB (xpd; Future      Follow up for evaluation with one of the physicians.       The above was reviewed and discussed with the patient.    Annual exam and screening issues based on the patient's age and family history were discussed.     Breast exam was normal.    Pelvic exam noted mildly enlarged uterus approximately 12-13 weeks.  I suspect this could be due to her fibroids which were detected on a prior ultrasound.  Explained that fibroids are benign tumors that grow in the uterus.  Fibroids feed on estrogen and could be contributing to her heavy menses.  Recommended updating imaging to evaluate heaviness of the menstrual cycles.  She has not interested in hormonal management at this time.  She will plan to follow up with a physician to discuss further options.      - Pap and HPV testing performed. Further recommendations for future pap smears and HPV testing will be provided once results return on the portal.       The patient's questions were answered, and she agrees with the current  plan.      She was counseled to follow up with her PCP for other routine health maintenance.      Amanda Weldon PA-C  03/12/2024

## 2024-03-13 ENCOUNTER — NUTRITION (OUTPATIENT)
Dept: NUTRITION | Facility: CLINIC | Age: 39
End: 2024-03-13
Payer: MEDICAID

## 2024-03-13 DIAGNOSIS — E66.9 CLASS 1 OBESITY WITH BODY MASS INDEX (BMI) OF 31.0 TO 31.9 IN ADULT, UNSPECIFIED OBESITY TYPE, UNSPECIFIED WHETHER SERIOUS COMORBIDITY PRESENT: Primary | ICD-10-CM

## 2024-03-13 PROCEDURE — 99999 PR PBB SHADOW E&M-EST. PATIENT-LVL I: CPT | Mod: PBBFAC,,,

## 2024-03-13 PROCEDURE — 97802 MEDICAL NUTRITION INDIV IN: CPT | Mod: PBBFAC,PN

## 2024-03-13 PROCEDURE — 99999PBSHW PR PBB SHADOW TECHNICAL ONLY FILED TO HB: Mod: PBBFAC,,,

## 2024-03-13 PROCEDURE — 99211 OFF/OP EST MAY X REQ PHY/QHP: CPT | Mod: PBBFAC,PN

## 2024-03-13 NOTE — PATIENT INSTRUCTIONS
Name: Carmina Hernandez  Date: 3/13/2024    Nutrition protocol    Daily Energy Requirements:  Calories: 3168-5799  Protein: 145-170 grams  Carbohydrates:  170 grams  Total Fats: 65-75 grams  Focus on Heart Healthy Fats  Fluid:  85 fl ounces + sweat loss   Limit Added sugar to 20 grams per day    Wake 5:00 AM    Workout: 5:30-7:00 AM    Breakfast:  7:45 AM   2-3 servings of Carbohydrates (30-45 grams) + at least 25 grams lean protein/heart healthy fats   Breakfast option 1: Protein Shake/Smoothie + Tortilla  1 cup blueberries  1 scoop whey protein powder  6-8 oz. 2% milk   1 tsp. peanut butter  1 Carb Balance tortilla (soft taco size) - recommend using olive oil spray to cook  Breakfast option 2: Overnight Oats   Can be made hot or cold  ½ cup Old Fashioned Oats, uncooked (will be ~1 cup once liquid is absorbed)  1 scoop of Protein Powder (the brand you have is great!)  2% milk -OR- unsweetened almond milk (enough to cover the oats to absorb the liquid)  ½ cup berries or sliced fruit of choice  1 serving of nuts/seeds  Breakfast option 3: Avocado toast + Eggs  1-2 slices 100% whole wheat toast  2 eggs + 1 egg whites   1/3 medium avocado  Unlimited non-starchy vegetables (onion, bell pepper, mushroom, spinach etc)  Breakfast option 4: Greek Yogurt Bowl  1 ¼ cup berries or 1 serving of fruit, sliced  ¾ cup non-fat Greek yogurt (see brands at end of document)  ¼ cup granola (use your homemade or see brand recommendations)  1 serving of nuts or nut butter        Try to incorporate midday movement      Lunch: 11:00-11:30 AM  5-6 ounces Protein + 2-3 servings (30-45 g) of Carbohydrates + heart healthy fats + Unlimited Non-Starchy Vegetables   Lunch option 1:   5-6 oz. of lean protein  At least ½ cup cooked non-starchy vegetables (sautéed with olive oil)  1 cup whole grain starch (chosen from Meal Planning Guidebook)  Lunch option 2: Salad  5-6 ounces of lean protein (chicken, salmon, turkey, etc)  2 cups dark leafy  green mixture (spring mix, kale, arugula, mixed with mauro lettuce)  ½ cup of starch/grain (cubed sweet potatoes, brown rice, etc)  ½ cup fruit (blueberries, strawberries, grapes etc)  Unlimited non-starchy vegetables  1 ounce dressing (lightly coat the lettuce)  Lunch option 3: [place]   [place]   4 oz. of lean meat (chosen from meal planning guide--ex. Grilled chicken, turkey breast, chicken/tuna salad)   1 oz. cheese  2 slices of 100% whole wheat bread (see options below)  Dressed with lettuce, sliced tomato, pickles, 1 Tbsp Jiménez + Mustard (as desired)  1 serving of fruit -OR- 1 serving 100% whole grain chips (see brand recommendations)  Lunch option 4:  3 ounces Banza chickpea pasta OR Lentil Pasta  4 oz. lean ground turkey   ½ cup marinara sauce (see brands below)  Unlimited amounts of zoodles or Palmini   Unlimited non starchy vegetables  Lunch option 5:  2 eggs + 2 egg whites  2 oz. cheese (1 oz. per tortilla)  2 Carb Balance tortillas (soft taco size)  Unlimited non-starchy vegetables  Lunch option 6: Fast Food/Fast Casual  Refer to Fueling Well On The Go Guide for better-for-you options       Snack: 2:30-3:00 PM  1-2 serving of Carbohydrates (15-30 g) + at least 20 grams lean protein/heart healthy fats   Select 1 from the list--All separate options  5 oz. Oikos Triple Zero yogurt + ½ cup berries + ¼ cup granola + 1 Tbsp. chopped nuts  1 Tbsp mixed nuts + 1 serving of fruit    1 slice 100% whole wheat toast + 1 Tbsp of peanut butter  ½ cup Hummus + 1 cup mixed raw vegetables for dipping  1 cup bell peppers OR 1 serving beanitos + ½ cup guacamole   2 oz. redued-fat cheese + 1 serving whole grain crackers (see brand recommendations) + 1 serving of nuts  1-2 Tbsp Peanut butter + 1 Apple  Protein bar (see brand recommendations)  Protein shake made with 1 scoop protein powder, unsweetened almond milk, 1 cup frozen fruit, ½ cup spinach/kale, 1 teaspoon peanut butter, 1 teaspoon hemp + cdoy seeds  ½ -1 cup  Edamame  2 Hardboiled eggs + 1 serving fruit + 1 serving of nuts      Dinner: 6:00-7:00 PM   5-6 ounces of Protein + Unlimited Non-Starchy Vegetables + 1-2 Servings of Carbohydrates (15-30 g) + heart healthy fats  Dinner option 1:  5-6 ounces of lean protein (ex. pork loin, fillet, sirloin, pork chops, chicken without skin)  1 cup or more cooked non-starchy vegetables  1 serving of starchy vegetable from meal planning guidebook  Dinner option 2:   4 oz. lean protein (chosen from Meal Planning Guide)  Baked, broiled, grilled, poached, air-fried   2 [place] Carb Balance 6-inch tortillas  1 oz. reduced-fat cheese  2 Tbsp. guacamole  Salsa to taste  Top with lettuce, tomatoes, onion  Dinner option 3:  Here are a few blogs that typically have balanced recipes, still vet out the ingredients   Fit Foodkaz Perdomo  SkinnyTaste  NutritionStried  Downshiftology  Minimalist David Cope's Savory [facility]  A Mind Full Mom  Paleo Running Momma  Dinner option 4:  Any lunch option can be a dinner option, just decrease carbs eaten  Dinner option 5:  Out to eat, refer to the restaurant tips below, choose Eat Fit partnered restaurants or refer to the Fueling Well on the Go guide to choose better-for- you options        Restaurant Tips    Pick 1 out of the 4 Rule: Instead of eating bread/tortilla chips, an appetizer, alcoholic drink and dessert, choose just one to have with your entrée   Focus on lean proteins: Refer to lean meat/meat substitutes page in meal planning guidebook. Select items grilled, baked, broiled, braised, poached or roasted       For your heart health, avoid crispy, crunchy, breaded, paneed or stuffed items and items that are cream based, au gratin or buttered       Order sauces, dressings, and gravies on the side. This way you can add 1-2 tablespoons yourself. This helps with portion control      Request extra non-starchy vegetables instead of a starchy side dish. If the starchy side is something you love,  consider splitting it with someone else at the table      Beverages: Order water with lemon, sparkling water, or unsweetened tea. Avoid sugary soft drinks, juices and mixers   Deep fried foods: Enjoy no more than 2x/month         Dining Out      [facility]   Designed to take the guesswork out of dining out healthfully, [facility] makes the healthy choice the easy choice   Visit [email or web address]    Order the Veotag Cookbook to create restaurant quality dishes at home   Download the [facility] Eat [facility] jena for free on your smartphone   All Eat Fit restaurants & dishes by location    Nutrition facts for every Eat Fit dish   200 + Eat Fit approved recipes   Grocery shopping guides + community wellness resources         Building A Better Smoothie   Choose your protein. Pick 1 from the followin oz 2% Plain Greek yogurt   5 oz 2% Cottage cheese   Plant-based protein powder   [facility]   Garden of Life RAW   100% whey protein powder   2-3 scoops Collagen Peptides (Vital Proteins is a brand favorite)   Make it sweet:   Add ~1-1.5 cups fresh -or- No Sugar Added frozen fruit    Add your veggies:   Instead of ice, choose frozen cauliflower florets    Cauliflower helps with the detoxification process in our bodies, and it's virtually tasteless!   Greens: spinach, kale, or other leafy greens   Beets are a great addition to smoothies, especially paired with strawberries and lemon   Cucumbers and celery are refreshing ways to enhance nutrition and hydration within your smoothie   Add one of the following plant-based fats:    Nut butter: 1 teaspoon - 1 tablespoon   Natural peanut butter   Wisdom butter   Cashew butter   Nuts: ~2-3 tablespoons    Avocado (¼ - ½ Carmona)   Avocado oil  Extra Virgin Olive Oil: 1 teaspoon - 1 tablespoon   Add a liquid to reach your desired consistency:   Unsweetened/No Sugar Added plant-based milk alternative    Wisdom, Hemp, Cashew, Coconut, Rice or Soy    Milk: 1% or  2%   Healthy add-ins for an extra nutritional boost:   1 tablespoon flaxseeds -or- cody seeds          Ordering A Better-For-You Salad   Include a lean protein, any amount of non-starchy vegetables, and small amount of plant-based fats   Order dressings on the side to better control portion sizes   Add ~2-3 tablespoons yourself to lightly coat   Pick 2-3 salad add-ins, each being ~2 tablespoons:   Dressing   Cheese   Nuts   Avocado/Guacamole         Additional Nutrition Tips      -Exercise  Movement: Aim for at least 45-60 minutes of moderate exercise 5 days a week.    If you're getting back in the routine of exercising, then start off with small goals   Even a 15-minute daily walk adds up    Be consistent and increase the length of time you exercise gradually until you're able to do at least 45 minutes most days of the week   Be sure to consume at least 20 grams protein within 1-hour post weight bearing exercise/high intensity workouts      -Grocery Aisle Food Brand Guidelines: George #7 rule   Look for products that have 7 grams or less added sugar, and at least 7 grams or more protein      -Frozen Meal Guidelines:    ~45 grams or less carbohydrates & at least 20 grams protein   If you find a brand you enjoy that doesn't provide 20 grams protein, you can add leftover lean protein to the frozen meal   See notes below for several brand examples   Refer to the Eat Fit Shopping Guide for additional brand specific recommendations      -Portion Control:   Measure and/or weigh your food in cooked portions for the first time you start your plan   See what each portion looks like on your plate and then eyeball each portion for future meals and snacks      -Avoid/Limit the following as these may be inflammatory to our body and can decrease Energy throughout the day if consumed often:   Added Sugar   White, refined, processed carbohydrates   Alcohol   Fried Foods   -If you're having trouble finding a specific food brand,  try looking on the brands website. Most companies have a 'store   find a store' on their website         Favorite Food Brands      - Whole Grain Breads:   Andre's Killer Bread - 21 Whole Grains and Seeds (green label), Good Seed (yellow label), Power Seed (red label)    Thin sliced -or- regular slice   Any 100% whole wheat/whole grain bread   'Base Culture' 7 Nut + Seed and Original Paleo Bread (GF and found in freezer section)   Great to use for dinner since low carb      -Whole Grain Tortillas  Wraps:   Corn    Siete: Grain free tortillas: coconut flour  almond flour   Gravel Switch - Whole wheat tortillas + whole wheat carb balance      -High Protein Pastas:   'Banza' chickpea pastas:    Mac-n-cheese   Pasta's - all varieties    Red lentil  Yellow lentil pasta   Black bean pasta (aka squid ink pasta)   Edamame-based pasta      -Rice:   Brown rice (available in 10-minute boil in a bag)   Banza chickpea rice   RightRice -made from vegetables    10 grams protein per serving      -Whole Grain Pancakes Mixes:   [place] Cakes: Power Cakes à 100% whole grain buttermilk flapjack & waffle mix   FlapJacked: Protein pancake & baking mix       -Grab and Go Protein Muffin:   FlapJacked: Mighty Muffin [typically found on baking aisle]      -Whole Grain Frozen Waffles:   Kashi GO Protein Waffles   [place] Cakes Gluten Free and Whole Grain Protein Waffles   Van's Power Grains Original      -Frozen Breakfast Sandwiches  Bowls for On-The-Go:   José Harris' (English muffin)   José Harris' Egg'Wich (the breadless breakfast)   José Vega Egg Bites   GoodFood [egg white ted breakfast sandwich]   EVOL: Morning bowls  Lean and fit options      -Cold Cereals:   KashiGO grain free  Dark cocoa + cinnamon vanilla   Aliyah Crunch keto-friendly cereals (GF)   :ratio KETO friendly cereal   Iwon organic protein crunchies   Three Wishes (GF)   Magic Spoon: grain-free cereals [[employer] or order on their website] (GF)   Also  available in Single Serve Cups [[employer]]   Special K PROTEIN   Premier Protein       -Granola: [Recommend using as a topper for crunch, and not as a main meal]   ProGranola by Joe Shrestha   Engine 2 Plant Strong   Good Granoly Health Nut   :ratio KETO friendly toasted almond granola      - Hot Cereals-Grab & Go Oatmeal Cups:   Powerful overnight oats   Matty's Red Mill: Gluten free oatmeal with flax and Guillaume   Wild Friends: Oats & nut butter      -Whole Grain Chips:   SunChips   Beanito's  Beanfields bean chips   PopCorners- Flex Energy packed protein crisps       -High Protein Chips:   iwon organics protein stix  protein puffs   Quest   Protes      -Whole Grain Crackers:   Triscuits - Regular + thin crisps   Wheat Thins   Blue Viki almond nut thins   Airam's Gone Cracker   Crunchmaster protein sea salt cracker      - Red Sauce (In A Jar):   Mat & Cecilia's Heart Smart Sauce    Classico Original   Engine 2 Plant-Strong      -Protein  Granola Bars:   Nature Valley Protein Chewy    Kashi Go Protein Bar    KIND Protein + KIND Bars (with 7 grams sugar or less)    Rx Bar    Rx Layers Bars    Kashi Grain Free Coconut Nightmute   Oatmega Bars   :ratio KETO friendly crunchy bar      -Ready-to-Drink Protein Drinks:   Iconic Grass-Fed Protein Drink   Orgain Clean- grass-fed + plant based   OWYN Plant-Based Protein Shake   Fairlife 30-gram Protein Drink   [facility] CORE POWER Protein Drink      -Nut Malden & Jellies:   Abbe's Nut Malden   MaraNatha Nut Malden   PBfit Protein Peanut Butter & Nightm[place] Butter   SunButter, unsweetened   Jelly: Polaner's All Fruit NSA (no sugar added)      -Frozen Meals à Single Serving:   Healthy Choice: MAX    Healthy Choice: POWER Bowls   Happi Foodi: Carb Wise [Keto meal]   'Life Cuisine' Keto pizza   Realgood Co: Real Enchilada's   Eating Well: [Rouses]   Quest: Thin crust  low carb       -Frozen Meals à Bulk Low Carb Options:   Miles'flour Foods: Keto Lasagna   Realgood: Low carb Lightly  breaded chicken strips/nuggets   Nature'sintent: Baked Cauli & Cheese [[employer] only]   Just Bare: Lightly breaded chicken breast strips/bites [[employer] only]      -Alexandru's Natural Foods à Heat and Eat Entrees    Found in refrigerated section of grocery stores   You can freeze these for up to 6 months      -Already Flavored Greek Yogurt:   Oikos Pro (20 grams protein  3 grams total sugar)   Chobani Less Sugar   Chobani Zero Sugar   Oikos Triple Zero   Two Good - All varieties    :ratio PROTEIN coconut Greek yogurt (25 grams protein  3 grams total sugar)      -BBQ Sauce:    MANGO all natural    Primal Kitchen Classic BBQ sauce      -Salad Dressings:   [place]inas's Salad Dressing: Sensation, Balsamic, Strawberry, Avocado, Caesar, Creole Ranch, Sweet Creole Mustard, Garlic & Red Wine?   Primal Kitchen- all varieties??   Morfin's Own - Balsamic Vinaigrette, Classic Oil & Vinegar?      -Better-For-You Ice Cream  Ice Cream Bars:   Halo Top: high protein ice cream pints - regular and keto series   Enlightened 'Light' ice cream   Halo Top: Pops   KETO: pint ice cream bars   Enlightened ice cream bar   Yasso Frozen Greek yogurt bar   KIND: Frozen treat bars      - Collagen: 'Vital Proteins' Collagen Peptides, unflavored:    Pantry staple   Can be added to soups, hummus, coffee, etc to make higher protein         -Supplements:   Nature Made Prenatal Capsules with Iron  Vitamin D3: 1,000 i.u per day    Fish Oil: Look for at least 1200 mg EPA + DHA per 2 capsules   Nordic's Natural's Ultimate Omega   MVI One-A-Day:   [facility] Josh Men -or- [facility]'s Women's Multivitamin Ultra Josh     To schedule/reschedule a nutrition follow-up appointment, please call [facility] at [phone]

## 2024-03-13 NOTE — PROGRESS NOTES
"Nutrition Assessment    Visit Type: Insurance initial  Session Time:  2 Hours  Reason for MNT visit: Pt in for education and nutrition counseling regarding  desired weight loss .       Age: 38 y.o.  Wt:   Wt Readings from Last 1 Encounters:   03/12/24 77 kg (169 lb 12.1 oz)     Ht:   Ht Readings from Last 1 Encounters:   03/12/24 5' 2" (1.575 m)     BMI:   BMI Readings from Last 1 Encounters:   03/12/24 31.05 kg/m²       Client states:  She would like to lose weight. Carmina reports she has gained 20 lbs over the past 2 years, likely due to eating larger portions lately. She knows she likely overeats healthy foods. Carmina goes to the gym 5 days/week for strength training and gets around 10,000 steps in each day.     Medical History  Problem List             Resolved    Brow ptosis, right            No past medical history on file.    Past Surgical History:   Procedure Laterality Date    KELOID EXCISION  2010    chest          Medications    Prior to Admission medications    Not on File        Vitamins, Minerals, and/or Supplements:  Prenatal MVI Gummy    Food Allergies or Intolerances:  NKFAI     Social History    Marital status:  Single    Social History     Tobacco Use    Smoking status: Never    Smokeless tobacco: Never   Substance Use Topics    Alcohol use: Not Currently     Current Alcohol use: None    Lab Reports   Reviewed and noted    Lab Results   Component Value Date    XHYHSSUI94WT 44 03/04/2024    TSH 0.572 03/04/2024    FREET4 0.90 02/06/2023    AST 21 03/04/2024    ALT 22 03/04/2024    HDL 56 03/04/2024    LDLCALC 61.8 (L) 03/04/2024    TRIG 46 03/04/2024    HGBA1C 5.3 03/04/2024    HGBA1C 5.2 09/26/2022    HGBA1C 5.3 10/21/2021         BP Readings from Last 1 Encounters:   03/12/24 (!) 118/55        24-hour Recall    Breakfast: protein shake (nonfat Greek yogurt + blueberries + 1 scoop whey protein) + 2 Carb Balance tortillas cooked in olive oil  Snack: Oikos Triple Zero yogurt + dark chocolate " chips  Lunch: lean protein (ground turkey/chicken/flank steak) + 3/4 cup rice + vegetables  Snack: Oikos Triple Zero yogurt + granola  Dinner: similar to lunch, usually has less rice          Beverages  Water: 75 fl ounces/day  Hot tea: 8 fl ounces/day      LIFESTYLE FACTORS    Dinning out: 1-2 x per week, mostly restaurants    Meal preparation/shopping:     Self and     Sleep: fair    Stress Level: moderate    Support System:  spouse and children    Exercise Regimen: Moderately active (moderate intensity, 3-5 days a week)      Diagnosis    Involuntary weight gain related to large portion sizes as evidenced by patient report and 24-hour recall.      Intervention    Estimated Energy Requirements:   Calories: 6562-1406  Protein: 145-170 grams  Carbohydrates: 170 grams  Total Fat: 65-75 grams  Baseline for fluids: 85 fl ounces + sweat loss    Recommendations & Goals:  Patient goals and recommendations are tailored to the specific patient's needs, readiness to change, lifestyle, culture, skills, resources, & abilities. Strategies to help achieve these nutrition-related goals were discussed which can include but are not limited to SMART goal setting & mindful eating.     Aim for a minimum of 7 hours sleep   Exercise 60 minutes most days  Eat breakfast within 1-2 hours of waking up  Try not to skip any meals or snacks, not going more than 3-4 hours without eating   At each meal and snack, try to include a source of fiber + lean protein + healthy fat     Written Materials Provided  These resources are intended to assist the patient in making it easier to choose recommended options when eating out & to identify better-for-you brands at the grocery store:    Meal Planning Guide with recommendations discussed along with portion sizes and a customized meal plan   Fueling Well On-the-Go Food Guide  Eat Fit Shopping Guide  Lifestyle Nutrition Meal Guide  RD contact information    Goals:  1.  Eat every 3-4 hours.   2.   Incorporate a source of lean protein, fiber, and healthy fat with each meal and snack.   3.  Fill half of your plate with non-starchy vegetables at dinner time.       Monitoring/Evaluation    Monitor the following:  Weight  Sleep  Stress Management  Movement  Nutrient intake in reference to meal plan    Communicated with healthcare provider and documented plan for referral to appropriate agency/healthcare provider as needed    Supervising Physician: Donnie Merritt MD    Patient motivation, anticipated barriers, expected compliance: Patient is motivated and has verbalized understanding and intent to comply.     Comprehension: good     Follow-up: 4-6 weeks

## 2024-03-18 ENCOUNTER — OFFICE VISIT (OUTPATIENT)
Dept: NEUROLOGY | Facility: CLINIC | Age: 39
End: 2024-03-18
Payer: MEDICAID

## 2024-03-18 VITALS
BODY MASS INDEX: 31.85 KG/M2 | DIASTOLIC BLOOD PRESSURE: 72 MMHG | SYSTOLIC BLOOD PRESSURE: 111 MMHG | HEART RATE: 74 BPM | WEIGHT: 174.19 LBS

## 2024-03-18 DIAGNOSIS — E66.9 OBESITY, UNSPECIFIED CLASSIFICATION, UNSPECIFIED OBESITY TYPE, UNSPECIFIED WHETHER SERIOUS COMORBIDITY PRESENT: ICD-10-CM

## 2024-03-18 DIAGNOSIS — R51.9 CHRONIC NONINTRACTABLE HEADACHE, UNSPECIFIED HEADACHE TYPE: ICD-10-CM

## 2024-03-18 DIAGNOSIS — G89.29 CHRONIC NONINTRACTABLE HEADACHE, UNSPECIFIED HEADACHE TYPE: ICD-10-CM

## 2024-03-18 DIAGNOSIS — H57.811 BROW PTOSIS, RIGHT: Primary | ICD-10-CM

## 2024-03-18 PROCEDURE — 99204 OFFICE O/P NEW MOD 45 MIN: CPT | Mod: S$PBB,,, | Performed by: STUDENT IN AN ORGANIZED HEALTH CARE EDUCATION/TRAINING PROGRAM

## 2024-03-18 PROCEDURE — 3078F DIAST BP <80 MM HG: CPT | Mod: CPTII,,, | Performed by: STUDENT IN AN ORGANIZED HEALTH CARE EDUCATION/TRAINING PROGRAM

## 2024-03-18 PROCEDURE — 3044F HG A1C LEVEL LT 7.0%: CPT | Mod: CPTII,,, | Performed by: STUDENT IN AN ORGANIZED HEALTH CARE EDUCATION/TRAINING PROGRAM

## 2024-03-18 PROCEDURE — 99213 OFFICE O/P EST LOW 20 MIN: CPT | Mod: PBBFAC | Performed by: STUDENT IN AN ORGANIZED HEALTH CARE EDUCATION/TRAINING PROGRAM

## 2024-03-18 PROCEDURE — 3074F SYST BP LT 130 MM HG: CPT | Mod: CPTII,,, | Performed by: STUDENT IN AN ORGANIZED HEALTH CARE EDUCATION/TRAINING PROGRAM

## 2024-03-18 PROCEDURE — 3008F BODY MASS INDEX DOCD: CPT | Mod: CPTII,,, | Performed by: STUDENT IN AN ORGANIZED HEALTH CARE EDUCATION/TRAINING PROGRAM

## 2024-03-18 PROCEDURE — 99999 PR PBB SHADOW E&M-EST. PATIENT-LVL III: CPT | Mod: PBBFAC,,, | Performed by: STUDENT IN AN ORGANIZED HEALTH CARE EDUCATION/TRAINING PROGRAM

## 2024-03-18 NOTE — PROGRESS NOTES
[facility] - NEUROLOGY 7TH [address] [facility], [place]    Date: 3/18/24  Patient Name: Carmina Hernandez   MRN: [id number]   PCP: Clay Lacy  Referring Provider: Self, Aaareferral    Assessment:   Carmina Hernandez is a 38 y.o. female presenting for right ptosis with right forehead headache from compensating. No migrainous or autonomic features. Exam with isolated right ptosis, normal pupillary response and full EOM. No fatigability; has already had negative testing for myasthenia gravis. Will obtain MRI Brain w/wo contrast for further evaluation. Will follow up results with patient; if imaging unremarkable ok to continue with plan for Botox per Ophthalmology.    Plan:     Problem List Items Addressed This Visit          Ophtho    Brow ptosis, right - Primary    Current Assessment & Plan     Normal pupillary response and full EOM.  - MRI Brain w/wo contrast         Relevant Orders    MRI Brain W WO Contrast       Endocrine    Obesity (Chronic)    Current Assessment & Plan     BMI 31.8          Other Visit Diagnoses       Chronic nonintractable headache, unspecified headache type                Sharon Manning MD    Subjective:   Patient seen in consultation at the request of Fely Cortes MD for the evaluation of headaches. A copy of this note will be sent to the referring physician.        HPI:   Ms. Carmina Hernandez is a 38 y.o. female with no PMH presenting for headaches. Located above her right eyebrow, related to purposely compensating for her right ptosis by lifting the eyelid. Feels that her ptosis and headache have worsened with time. The ptosis does not affect her vision. Gets the pain almost daily that is worse at end of day, takes ibuprofen 200 mg that somewhat helps. If she goes to sleep, the pain will be gone on waking. No nausea/vomiting, photophobia, phonophobia, diplopia, eye tearing, facial redness, or dysphagia. No history of migraines or frequent headaches before  this.      PAST MEDICAL HISTORY:  No past medical history on file.    PAST SURGICAL HISTORY:  Past Surgical History:   Procedure Laterality Date    KELOID EXCISION  2010    chest       CURRENT MEDS:  No current outpatient medications on file.     No current facility-administered medications for this visit.       ALLERGIES:  Review of patient's allergies indicates:   Allergen Reactions    Sulfa (sulfonamide antibiotics)        FAMILY HISTORY:  Family History   Problem Relation Age of Onset    Dementia Mother     Hypertension Father     No Known Problems Daughter     Breast cancer Maternal Aunt     Cancer Neg Hx     Colon cancer Neg Hx     Diabetes Neg Hx     Eclampsia Neg Hx     Miscarriages / Stillbirths Neg Hx     Ovarian cancer Neg Hx      labor Neg Hx     Stroke Neg Hx     Glaucoma Neg Hx     Macular degeneration Neg Hx     Retinal detachment Neg Hx        SOCIAL HISTORY:  Social History     Tobacco Use    Smoking status: Never    Smokeless tobacco: Never   Substance Use Topics    Alcohol use: Not Currently    Drug use: Never       Review of Systems:  12 system review of systems is negative except for the symptoms mentioned in HPI.      Objective:     Vitals:    24 1259   BP: 111/72   Pulse: 74   Weight: 79 kg (174 lb 2.6 oz)     General: NAD, well nourished   Eyes: no tearing, discharge, no erythema   ENT: moist mucous membranes of the oral cavity, nares patent    Neck: Supple, full range of motion  Cardiovascular: Warm and well perfused, pulses equal and symmetrical  Lungs: Normal work of breathing, normal chest wall excursions  Skin: No rash, lesions, or breakdown on exposed skin  Psychiatry: Mood and affect are appropriate   Abdomen: soft, non tender, non distended  Extremities: No cyanosis, clubbing or edema.    Neurological   MENTAL STATUS: Alert and oriented to person, place, and time. Attention and concentration within normal limits. Speech without dysarthria, able to name and repeat without  difficulty. Recent and remote memory within normal limits   CRANIAL NERVES: Visual fields intact. PERRL. EOMI. Facial sensation intact. Face symmetrical. Hearing grossly intact. Full shoulder shrug bilaterally. Tongue protrudes midline   SENSORY: Sensation is intact to light touch throughout.    MOTOR: Normal bulk and tone. No pronator drift.  5/5 shoulder abduction, elbow flexion/extension, finger abduction bilaterally. 5/5 hip flexion, knee extension/flexion, foot dorsi/plantarflexion bilaterally.    REFLEXES: Symmetric and 1+ throughout.    CEREBELLAR/COORDINATION/GAIT: Gait steady with normal arm swing and stride length. Finger to nose intact. Normal rapid alternating movements.  Heel to shin intact.

## 2024-03-19 ENCOUNTER — HOSPITAL ENCOUNTER (OUTPATIENT)
Dept: RADIOLOGY | Facility: HOSPITAL | Age: 39
Discharge: HOME | End: 2024-03-19
Attending: STUDENT IN AN ORGANIZED HEALTH CARE EDUCATION/TRAINING PROGRAM
Payer: COMMERCIAL

## 2024-03-19 DIAGNOSIS — Z86.018 HISTORY OF UTERINE FIBROID: ICD-10-CM

## 2024-03-19 PROCEDURE — 76830 TRANSVAGINAL US NON-OB: CPT | Mod: 26,,, | Performed by: RADIOLOGY

## 2024-03-19 PROCEDURE — 76856 US EXAM PELVIC COMPLETE: CPT | Mod: 26,,, | Performed by: RADIOLOGY

## 2024-03-19 PROCEDURE — 76830 TRANSVAGINAL US NON-OB: CPT | Mod: TC,PO

## 2024-03-20 LAB
FINAL PATHOLOGIC DIAGNOSIS: NORMAL
Lab: NORMAL

## 2024-03-26 ENCOUNTER — OFFICE VISIT (OUTPATIENT)
Dept: OBSTETRICS AND GYNECOLOGY | Facility: CLINIC | Age: 39
End: 2024-03-26
Payer: COMMERCIAL

## 2024-03-26 VITALS
HEIGHT: 62 IN | DIASTOLIC BLOOD PRESSURE: 80 MMHG | BODY MASS INDEX: 31.72 KG/M2 | WEIGHT: 172.38 LBS | SYSTOLIC BLOOD PRESSURE: 120 MMHG

## 2024-03-26 DIAGNOSIS — D21.9 LEIOMYOMA: Primary | ICD-10-CM

## 2024-03-26 DIAGNOSIS — N94.10 DYSPAREUNIA IN FEMALE: ICD-10-CM

## 2024-03-26 DIAGNOSIS — N93.9 ABNORMAL UTERINE BLEEDING (AUB): ICD-10-CM

## 2024-03-26 PROCEDURE — 99999 PR PBB SHADOW E&M-EST. PATIENT-LVL II: CPT | Mod: PBBFAC,,, | Performed by: GENERAL PRACTICE

## 2024-03-26 PROCEDURE — 99214 OFFICE O/P EST MOD 30 MIN: CPT | Mod: S$GLB,,, | Performed by: GENERAL PRACTICE

## 2024-03-26 PROCEDURE — 1159F MED LIST DOCD IN RCRD: CPT | Mod: CPTII,S$GLB,, | Performed by: GENERAL PRACTICE

## 2024-03-26 PROCEDURE — 3074F SYST BP LT 130 MM HG: CPT | Mod: CPTII,S$GLB,, | Performed by: GENERAL PRACTICE

## 2024-03-26 PROCEDURE — 3008F BODY MASS INDEX DOCD: CPT | Mod: CPTII,S$GLB,, | Performed by: GENERAL PRACTICE

## 2024-03-26 PROCEDURE — 99212 OFFICE O/P EST SF 10 MIN: CPT | Mod: PBBFAC,PO | Performed by: GENERAL PRACTICE

## 2024-03-26 PROCEDURE — 3044F HG A1C LEVEL LT 7.0%: CPT | Mod: CPTII,S$GLB,, | Performed by: GENERAL PRACTICE

## 2024-03-26 PROCEDURE — 3079F DIAST BP 80-89 MM HG: CPT | Mod: CPTII,S$GLB,, | Performed by: GENERAL PRACTICE

## 2024-03-26 RX ORDER — LEVONORGESTREL AND ETHINYL ESTRADIOL 0.15-0.03
1 KIT ORAL DAILY
Qty: 90 TABLET | Refills: 4 | Status: SHIPPED | OUTPATIENT
Start: 2024-03-26 | End: 2024-03-26

## 2024-03-26 RX ORDER — LEVONORGESTREL / ETHINYL ESTRADIOL AND ETHINYL ESTRADIOL 150-30(84)
1 KIT ORAL DAILY
Qty: 1 EACH | Refills: 4 | Status: SHIPPED | OUTPATIENT
Start: 2024-03-26 | End: 2025-03-26

## 2024-03-26 NOTE — PROGRESS NOTES
HISTORY OF THE PRESENT ILLNESS    2024  Carmina Hernandez is a 38 y.o. here for f/u of ultrasound results.  Previously seen by Ms. Weldon in our clinic who ordered the ultrasound b/c the patient's uterus felt enlarged on exam.    G'sP's:   LMP: 01 MAR  PAP / HPV (MAR 2024) = neg / neg  Contraception: Vasectomy  Relationship:   and sexually active     LABS & RADS   TSH (04 MAR 2024) = 0.572    Lab Date: 04 MAR 2024   WBC 6   HGB 12   HCT 35      Ferritin 19     PELVIC US (19 MAR 2024) =  Uterus 12  cm  EMS 15 mm  ROV [id number]  LOV 3x2x3 cm  Other: multiple fibroids, largest are 3 and 8cm <-- in  largest was 3cm     PAST MEDICAL HISTORY  None     PAST SURGICAL HISTORY  Office excision of keloid (from acne)    ALLERGIES  Review of patient's allergies indicates:   Allergen Reactions    Sulfa (sulfonamide antibiotics)        MEDICATIONS  Iron supplement    GYNECOLOGIC HISTORY  Menarche: 12 yoa  Period duration: 5-6 days  # Heavy Days: 4  Pad/tampon ? on heavy days: 10 a day  Intermenstrual bleeding: No  Period frequency:regular every 28-30 days    Dysmenorrhea: Yes - only within the last year  Non-menstrual pelvic pressure/pain: No  Dyspareunia: Yes: every time, deep, tried physical therapy in     PAP'S: no prior abnl    STI'S: no past history  Genital HSV: no    OBSTETRIC HISTORY  Children: 12yo daughter  Vaginal deliveries: 1    SOCIAL HISTORY  Lives with:  and daughter  Domestic Violence: no  Occupation: homemaker  Education Level: Graduate Degree, masters in ed  Smoker: non-smoker  Alcohol: denies  Drugs: denies    FAMILY HISTORY  BREAST/UTERINE/OVARIAN CANCER: maternal aunt breast CA  COLON CA: none    --------------------------------------------------------------------------------------------------------------    PHYSICAL EXAM  VITALS:  Vitals:    24 1057   BP: 120/80       GEN = alert/oriented, nad, pleasant  HEENT = sclera anicteric, EOM grossly normal   = deferred,  no acute concerns    ASSESSMENT AND PLAN:  38 y.o. with large fibroid uterus.  Has deep dyspareunia and dysmenorrhea in addition to HMB with iron deficiency / mild anemia.    Info given on fibroids  Discussed hormonal management (OCP, Depo, newer meds), EM Ablation, UAE, and hysterectomy.  Discussed relative pros/cons and expectations of each.  Advised hysterectomy is the only definitive management.  Would typically leave ovaries in her age group.  She is concerned about keloid formation.  She would like to start with OCP.  Advised they are less likely to improve her dyspareunia compared to her other symptoms (HMB, dysmenorrhea).  Choosing Seasonique to minimize period episodes per year.  RTC in 3-6 months if not satisfactorily managed.  May send message in [employer] if she'd like a referral for Uterine Artery Embolization  next cervical CA screen due MAR 2029    MD PAULA

## 2024-04-10 ENCOUNTER — PATIENT MESSAGE (OUTPATIENT)
Dept: FAMILY MEDICINE | Facility: CLINIC | Age: 39
End: 2024-04-10
Payer: COMMERCIAL

## 2024-04-12 ENCOUNTER — HOSPITAL ENCOUNTER (OUTPATIENT)
Dept: RADIOLOGY | Facility: HOSPITAL | Age: 39
Discharge: HOME | End: 2024-04-12
Attending: STUDENT IN AN ORGANIZED HEALTH CARE EDUCATION/TRAINING PROGRAM
Payer: COMMERCIAL

## 2024-04-12 DIAGNOSIS — H57.811 BROW PTOSIS, RIGHT: ICD-10-CM

## 2024-04-12 PROCEDURE — 25500020 PHARM REV CODE 255: Performed by: STUDENT IN AN ORGANIZED HEALTH CARE EDUCATION/TRAINING PROGRAM

## 2024-04-12 PROCEDURE — 70553 MRI BRAIN STEM W/O & W/DYE: CPT | Mod: TC

## 2024-04-12 PROCEDURE — 70553 MRI BRAIN STEM W/O & W/DYE: CPT | Mod: 26,,, | Performed by: RADIOLOGY

## 2024-04-12 PROCEDURE — A9585 GADOBUTROL INJECTION: HCPCS | Performed by: STUDENT IN AN ORGANIZED HEALTH CARE EDUCATION/TRAINING PROGRAM

## 2024-04-12 RX ORDER — GADOBUTROL 604.72 MG/ML
6 INJECTION INTRAVENOUS
Status: COMPLETED | OUTPATIENT
Start: 2024-04-12 | End: 2024-04-12

## 2024-04-12 RX ADMIN — GADOBUTROL 6 ML: 604.72 INJECTION INTRAVENOUS at 01:04

## 2024-04-15 ENCOUNTER — TELEPHONE (OUTPATIENT)
Dept: FAMILY MEDICINE | Facility: CLINIC | Age: 39
End: 2024-04-15
Payer: COMMERCIAL

## 2024-04-16 ENCOUNTER — OFFICE VISIT (OUTPATIENT)
Dept: FAMILY MEDICINE | Facility: CLINIC | Age: 39
End: 2024-04-16
Payer: COMMERCIAL

## 2024-04-16 DIAGNOSIS — M79.89 LEG SWELLING: Primary | ICD-10-CM

## 2024-04-16 PROCEDURE — 3044F HG A1C LEVEL LT 7.0%: CPT | Mod: CPTII,95,,

## 2024-04-16 PROCEDURE — 1159F MED LIST DOCD IN RCRD: CPT | Mod: CPTII,95,,

## 2024-04-16 PROCEDURE — 1160F RVW MEDS BY RX/DR IN RCRD: CPT | Mod: CPTII,95,,

## 2024-04-16 PROCEDURE — 99213 OFFICE O/P EST LOW 20 MIN: CPT | Mod: 95,,,

## 2024-04-16 RX ORDER — HYDROCHLOROTHIAZIDE 12.5 MG/1
12.5 TABLET ORAL DAILY PRN
Qty: 30 TABLET | Refills: 0 | Status: SHIPPED | OUTPATIENT
Start: 2024-04-16 | End: 2025-04-16

## 2024-04-16 NOTE — PROGRESS NOTES
Subjective:       Patient ID: Carmina Hernandez is a 38 y.o. female.  The patient location is: [place], [place]  The chief complaint leading to consultation is: Ankle/Feet swelling    Visit type: audiovisual    Face to Face time with patient: 10  20 minutes of total time spent on the encounter, which includes face to face time and non-face to face time preparing to see the patient (eg, review of tests), Obtaining and/or reviewing separately obtained history, Documenting clinical information in the electronic or other health record, Independently interpreting results (not separately reported) and communicating results to the patient/family/caregiver, or Care coordination (not separately reported).         Each patient to whom he or she provides medical services by telemedicine is:  (1) informed of the relationship between the physician and patient and the respective role of any other health care provider with respect to management of the patient; and (2) notified that he or she may decline to receive medical services by telemedicine and may withdraw from such care at any time.      Chief Complaint: Leg swelling    Patient presents to the clinic with complaint of ankle/feet swelling.     States has been ongoing for 2+ years now but seems to be getting worse. States swelling is intermittent. States swelling is mostly during the end of the day but sometimes also happens at night.   States she does not eat a lot of salt in her diet and she exercises regularly.   She also states she has tried compression stockings which does not help with the swelling. States the swelling does cause leg pain and discomfort.     Has no other complaints or concerns today.     Patient educated on plan of care, verbalized understanding.               Review of Systems   Constitutional:  Negative for activity change, appetite change, chills, diaphoresis and fever.   HENT:  Negative for congestion, ear pain, postnasal drip, sinus pressure, sneezing  and sore throat.    Eyes:  Negative for pain, discharge, redness and itching.   Respiratory:  Negative for apnea, cough, chest tightness, shortness of breath and wheezing.    Cardiovascular:  Positive for leg swelling. Negative for chest pain.   Gastrointestinal:  Negative for abdominal distention, abdominal pain, constipation, diarrhea, nausea and vomiting.   Genitourinary:  Negative for difficulty urinating, dysuria, flank pain and frequency.   Skin:  Negative for color change, rash and wound.   Neurological:  Negative for dizziness.   All other systems reviewed and are negative.      Patient Active Problem List   Diagnosis    Brow ptosis, right    Obesity       Objective:      Physical Exam  Constitutional:       General: She is not in acute distress.     Appearance: Normal appearance. She is well-developed. She is not ill-appearing.   HENT:      Head: Normocephalic.   Eyes:      Conjunctiva/sclera: Conjunctivae normal.      Pupils: Pupils are equal, round, and reactive to light.      Comments: As seen on virtual visit   Pulmonary:      Effort: Pulmonary effort is normal. No respiratory distress.   Musculoskeletal:      Cervical back: Normal range of motion and neck supple.   Skin:     General: Skin is warm and dry.   Neurological:      General: No focal deficit present.      Mental Status: She is alert and oriented to person, place, and time.   Psychiatric:         Mood and Affect: Mood normal.         Behavior: Behavior normal.         Lab Results   Component Value Date    WBC 6.42 03/04/2024    HGB 11.8 (L) 03/04/2024    HCT 34.9 (L) 03/04/2024     03/04/2024    CHOL 127 03/04/2024    TRIG 46 03/04/2024    HDL 56 03/04/2024    ALT 22 03/04/2024    AST 21 03/04/2024     03/04/2024    K 4.5 03/04/2024     03/04/2024    CREATININE 0.8 03/04/2024    BUN 17 03/04/2024    CO2 24 03/04/2024    TSH 0.572 03/04/2024    HGBA1C 5.3 03/04/2024     The ASCVD Risk score (Britany KELLY, et al., 2019) failed to  calculate for the following reasons:    The 2019 ASCVD risk score is only valid for ages 40 to 79    Assessment:       1. Leg swelling        Plan:       1. Leg swelling  -     hydroCHLOROthiazide (HYDRODIURIL) 12.5 MG Tab; Take 1 tablet (12.5 mg total) by mouth daily as needed (leg swelling).  Dispense: 30 tablet; Refill: 0   - Low sodium diet   - Increase water intake    - Compression stockings    - The diagnosis, treatment plan, instructions for follow-up and reevaluation as well as ED precautions were discussed and understanding was verbalized. All questions or concerns have been addressed.     Follow up if symptoms worsen or fail to improve.      Future Appointments       Date Provider Specialty Appt Notes    6/4/2024 Clay Lacy, DO Family Medicine 3 month f/u

## 2024-04-16 NOTE — PATIENT INSTRUCTIONS

## 2024-06-04 ENCOUNTER — OFFICE VISIT (OUTPATIENT)
Dept: FAMILY MEDICINE | Facility: CLINIC | Age: 39
End: 2024-06-04
Payer: COMMERCIAL

## 2024-06-04 VITALS
DIASTOLIC BLOOD PRESSURE: 70 MMHG | HEART RATE: 78 BPM | SYSTOLIC BLOOD PRESSURE: 112 MMHG | BODY MASS INDEX: 29.74 KG/M2 | HEIGHT: 62 IN | WEIGHT: 161.63 LBS | OXYGEN SATURATION: 99 %

## 2024-06-04 DIAGNOSIS — F32.A DEPRESSION, UNSPECIFIED DEPRESSION TYPE: Primary | ICD-10-CM

## 2024-06-04 DIAGNOSIS — Z09 FOLLOW-UP EXAM: ICD-10-CM

## 2024-06-04 DIAGNOSIS — F45.22 BODY DYSMORPHIC DISORDER: ICD-10-CM

## 2024-06-04 PROCEDURE — 3044F HG A1C LEVEL LT 7.0%: CPT | Mod: CPTII,S$GLB,, | Performed by: STUDENT IN AN ORGANIZED HEALTH CARE EDUCATION/TRAINING PROGRAM

## 2024-06-04 PROCEDURE — 1160F RVW MEDS BY RX/DR IN RCRD: CPT | Mod: CPTII,S$GLB,, | Performed by: STUDENT IN AN ORGANIZED HEALTH CARE EDUCATION/TRAINING PROGRAM

## 2024-06-04 PROCEDURE — 3008F BODY MASS INDEX DOCD: CPT | Mod: CPTII,S$GLB,, | Performed by: STUDENT IN AN ORGANIZED HEALTH CARE EDUCATION/TRAINING PROGRAM

## 2024-06-04 PROCEDURE — 1159F MED LIST DOCD IN RCRD: CPT | Mod: CPTII,S$GLB,, | Performed by: STUDENT IN AN ORGANIZED HEALTH CARE EDUCATION/TRAINING PROGRAM

## 2024-06-04 PROCEDURE — 99999 PR PBB SHADOW E&M-EST. PATIENT-LVL III: CPT | Mod: PBBFAC,,, | Performed by: STUDENT IN AN ORGANIZED HEALTH CARE EDUCATION/TRAINING PROGRAM

## 2024-06-04 PROCEDURE — 99213 OFFICE O/P EST LOW 20 MIN: CPT | Mod: S$GLB,,, | Performed by: STUDENT IN AN ORGANIZED HEALTH CARE EDUCATION/TRAINING PROGRAM

## 2024-06-04 PROCEDURE — 3078F DIAST BP <80 MM HG: CPT | Mod: CPTII,S$GLB,, | Performed by: STUDENT IN AN ORGANIZED HEALTH CARE EDUCATION/TRAINING PROGRAM

## 2024-06-04 PROCEDURE — 3074F SYST BP LT 130 MM HG: CPT | Mod: CPTII,S$GLB,, | Performed by: STUDENT IN AN ORGANIZED HEALTH CARE EDUCATION/TRAINING PROGRAM

## 2024-06-04 NOTE — PROGRESS NOTES
SUBJECTIVE:    CHIEF COMPLAINT:   Chief Complaint   Patient presents with    Follow-up           274}    HISTORY OF PRESENT ILLNESS:  Carmina Hernandez is a 38 y.o. female with a history of BDD who presents to the clinic for follow-up and review of labs.     She reports that she does continue to experienced fatigue she is unsure why.  She often works at the gym multiple times per week and feels that she is still unable to lose weight.  She feels that she is often consumed by thoughts that she is obese due to being told these things while in high school.  She admits that she has not able to move past these thoughts often.    Of note patient is from [place] and relocated to this area with her  a few years ago.  She was seen by Psychiatry for BDD approximately 6 months ago but chose not to continue follow-up.    PAST MEDICAL HISTORY:     274}  History reviewed. No pertinent past medical history.    PAST SURGICAL HISTORY:  Past Surgical History:   Procedure Laterality Date    KELOID EXCISION  2010    chest       SOCIAL HISTORY:  Social History     Socioeconomic History    Marital status: Single   Tobacco Use    Smoking status: Never    Smokeless tobacco: Never   Substance and Sexual Activity    Alcohol use: Not Currently    Drug use: Never    Sexual activity: Yes     Partners: Male     Birth control/protection: None     Social Determinants of Health     Financial Resource Strain: Low Risk  (4/13/2024)    Overall Financial Resource Strain (CARDIA)     Difficulty of Paying Living Expenses: Not hard at all   Food Insecurity: No Food Insecurity (4/13/2024)    Hunger Vital Sign     Worried About Running Out of Food in the Last Year: Never true     Ran Out of Food in the Last Year: Never true   Transportation Needs: No Transportation Needs (4/13/2024)    PRAPARE - Transportation     Lack of Transportation (Medical): No     Lack of Transportation (Non-Medical): No   Physical Activity: Sufficiently Active (4/13/2024)     "Exercise Vital Sign     Days of Exercise per Week: 5 days     Minutes of Exercise per Session: 70 min   Stress: No Stress Concern Present (2024)    [employer] - Occupational Stress Questionnaire     Feeling of Stress : Not at all   Housing Stability: Unknown (2023)    Housing Stability Vital Sign     Unable to Pay for Housing in the Last Year: No     Unstable Housing in the Last Year: No       FAMILY HISTORY:       Family History   Problem Relation Name Age of Onset    Dementia Mother      Hypertension Father      No Known Problems Daughter      Breast cancer Maternal Aunt      Cancer Neg Hx      Colon cancer Neg Hx      Diabetes Neg Hx      Eclampsia Neg Hx      Miscarriages / Stillbirths Neg Hx      Ovarian cancer Neg Hx       labor Neg Hx      Stroke Neg Hx      Glaucoma Neg Hx      Macular degeneration Neg Hx      Retinal detachment Neg Hx         ALLERGIES AND MEDICATIONS: updated and reviewed.      274}  Review of patient's allergies indicates:   Allergen Reactions    Sulfa (sulfonamide antibiotics)      Medication List with Changes/Refills   Current Medications    HYDROCHLOROTHIAZIDE (HYDRODIURIL) 12.5 MG TAB    Take 1 tablet (12.5 mg total) by mouth daily as needed (leg swelling).    L NORGEST/E.ESTRADIOL-E.ESTRAD (SEASONIQUE) 0.15 MG-30 MCG (84)/10 MCG (7) 3MPK    Take 1 tablet by mouth once daily.       SCREENING HISTORY:    274}  Health Maintenance         Date Due Completion Date    COVID-19 Vaccine ( season) 2023 3/19/2021    Influenza Vaccine (Season Ended) 2024 10/20/2022    Hemoglobin A1c (Diabetic Prevention Screening) 2027 3/4/2024    Cervical Cancer Screening 2029 3/12/2024    TETANUS VACCINE 08/15/2029 8/15/2019            REVIEW OF SYSTEMS:   Review of Systems    PHYSICAL EXAM:      274}  /70 (BP Location: Right arm, Patient Position: Sitting, BP Method: Small (Manual))   Pulse 78   Ht 5' 2" (1.575 m)   Wt " "73.3 kg (161 lb 9.6 oz)   SpO2 99%   BMI 29.56 kg/m²   Wt Readings from Last 3 Encounters:   06/04/24 73.3 kg (161 lb 9.6 oz)   03/26/24 78.2 kg (172 lb 6.4 oz)   03/18/24 79 kg (174 lb 2.6 oz)     BP Readings from Last 3 Encounters:   06/04/24 112/70   03/26/24 120/80   03/18/24 111/72     Estimated body mass index is 29.56 kg/m² as calculated from the following:    Height as of this encounter: 5' 2" (1.575 m).    Weight as of this encounter: 73.3 kg (161 lb 9.6 oz).     Physical Exam    LABS:   274}  I have reviewed old labs below:  Lab Results   Component Value Date    WBC 6.42 03/04/2024    HGB 11.8 (L) 03/04/2024    HCT 34.9 (L) 03/04/2024    MCV 91 03/04/2024     03/04/2024     03/04/2024    K 4.5 03/04/2024     03/04/2024    CALCIUM 9.5 03/04/2024    CO2 24 03/04/2024    GLU 89 03/04/2024    BUN 17 03/04/2024    CREATININE 0.8 03/04/2024    ANIONGAP 9 03/04/2024    PROT 7.1 03/04/2024    ALBUMIN 4.0 03/04/2024    BILITOT 0.2 03/04/2024    ALKPHOS 48 (L) 03/04/2024    ALT 22 03/04/2024    AST 21 03/04/2024    CHOL 127 03/04/2024    TRIG 46 03/04/2024    HDL 56 03/04/2024    LDLCALC 61.8 (L) 03/04/2024    TSH 0.572 03/04/2024    HGBA1C 5.3 03/04/2024       ASSESSMENT AND PLAN:  274}  1. Depression, unspecified depression type  Comments:  Therapy recommended.  Behavioral Health consultation placed  Orders:  -     Ambulatory referral/consult to Primary Care Behavioral Health (Non-Opioids); Future; Expected date: 06/06/2024    2. Body dysmorphic disorder  Comments:  Therapy recommended. Behavioral Health consultation placed  Orders:  -     Ambulatory referral/consult to Primary Care Behavioral Health (Non-Opioids); Future; Expected date: 06/06/2024    3. Follow-up exam           Orders Placed This Encounter   Procedures    Ambulatory referral/consult to Primary Care Behavioral Health (Non-Opioids)       Follow up in about 6 months (around 12/4/2024). or sooner as needed.        I spent a total " of 26 minutes on the day of the visit.  This includes face to face time and non-face to face time preparing to see the patient (eg, review of tests), obtaining and/or reviewing separately obtained history, documenting clinical information in the electronic or other health record, independently interpreting results and communicating results to the patient/family/caregiver, or care coordinator.

## 2024-07-31 NOTE — PROGRESS NOTES
HPI    Patient is here today for f/u right brow ptosis  Pt went to see Dr. Carrington after our last visit with her in which an MRI   brain was ordered. Pt sts she never received the results or interpretation   of this imaging due to Dr. Carrington leaving [facility] shortly after. Pt sts she   is still having headaches almost every other night which last for a couple   hours. She finds relief with OTC ibuprofen 200mg-800mg. She has no changes   in severity since her last visit.  She is still interested in discussing a chemical brow lift to help relieve   symptoms of right eyelid droop/heaviness when she has a headache.   Last edited by Ember Benavidez on 8/1/2024 11:40 AM.            Assessment /Plan     For exam results, see Encounter Report.    Brow ptosis, right  -     External Photography - OU - Both Eyes    Family history of migraine headaches in father  -     External Photography - OU - Both Eyes    Chronic nonintractable headache, unspecified headache type  -     External Photography - OU - Both Eyes      The patient is a pleasant 38-year-old female here for follow-up evaluation of persistent headache and right brow ptosis.  The patient underwent headache evaluation and MRI of the brain without any specific findings.  The patient continues to be bothered by the headache on a daily basis with persistent right-sided heaviness.  She is taking ibuprofen to alleviate the headaches.      On exam, the patient continues to have mild right temporal brow ptosis compared to the left side. Her bilateral upper eyelids are in symmetric position.      We discussed the option of cosmetic chemical brow lift on the right side with botulinum toxin of 2.5 units.  We went over the risks benefits alternatives of the botulinum toxin injection and informed consent was obtained.      Procedure note:  2.5 units of botulinum toxin injected to the right orbital orbicularis in the right temporal sub brow position. The patient tolerated the procedure  well without any complications.     Return in 3 months sooner any worsening

## 2024-08-01 ENCOUNTER — OFFICE VISIT (OUTPATIENT)
Dept: OPHTHALMOLOGY | Facility: CLINIC | Age: 39
End: 2024-08-01
Payer: COMMERCIAL

## 2024-08-01 DIAGNOSIS — H57.811 BROW PTOSIS, RIGHT: Primary | ICD-10-CM

## 2024-08-01 DIAGNOSIS — R51.9 CHRONIC NONINTRACTABLE HEADACHE, UNSPECIFIED HEADACHE TYPE: ICD-10-CM

## 2024-08-01 DIAGNOSIS — G89.29 CHRONIC NONINTRACTABLE HEADACHE, UNSPECIFIED HEADACHE TYPE: ICD-10-CM

## 2024-08-01 DIAGNOSIS — Z82.0 FAMILY HISTORY OF MIGRAINE HEADACHES IN FATHER: ICD-10-CM

## 2024-08-01 PROCEDURE — 99999 PR PBB SHADOW E&M-EST. PATIENT-LVL III: CPT | Mod: PBBFAC,,, | Performed by: OPHTHALMOLOGY

## 2024-08-07 ENCOUNTER — PATIENT MESSAGE (OUTPATIENT)
Dept: OPHTHALMOLOGY | Facility: CLINIC | Age: 39
End: 2024-08-07
Payer: COMMERCIAL

## 2024-08-07 DIAGNOSIS — H57.811 BROW PTOSIS, RIGHT: Primary | ICD-10-CM

## 2024-08-09 ENCOUNTER — TELEPHONE (OUTPATIENT)
Dept: OPHTHALMOLOGY | Facility: CLINIC | Age: 39
End: 2024-08-09
Payer: COMMERCIAL

## 2024-08-10 DIAGNOSIS — H57.811 BROW PTOSIS, RIGHT: Primary | ICD-10-CM

## 2024-08-10 RX ORDER — APRACLONIDINE HYDROCHLORIDE 5 MG/ML
SOLUTION/ DROPS OPHTHALMIC
Qty: 5 ML | Refills: 0 | Status: SHIPPED | OUTPATIENT
Start: 2024-08-10 | End: 2024-08-10 | Stop reason: SDUPTHER

## 2024-08-10 RX ORDER — APRACLONIDINE HYDROCHLORIDE 5 MG/ML
SOLUTION/ DROPS OPHTHALMIC
Qty: 5 ML | Refills: 0 | Status: SHIPPED | OUTPATIENT
Start: 2024-08-10

## 2024-08-19 ENCOUNTER — PATIENT MESSAGE (OUTPATIENT)
Dept: OPHTHALMOLOGY | Facility: CLINIC | Age: 39
End: 2024-08-19
Payer: COMMERCIAL

## 2024-08-22 ENCOUNTER — TELEPHONE (OUTPATIENT)
Dept: OPHTHALMOLOGY | Facility: CLINIC | Age: 39
End: 2024-08-22
Payer: COMMERCIAL

## 2024-08-22 NOTE — TELEPHONE ENCOUNTER
----- Message from Rose Pinto sent at 8/22/2024  9:09 AM CDT -----  Contact: pt @ [phone]  VICENTE SAMANO calling regarding Patient Advice (message) for #pt is calling to speak with Ember, asking for call back

## 2024-08-28 ENCOUNTER — OFFICE VISIT (OUTPATIENT)
Dept: BEHAVIORAL HEALTH | Facility: CLINIC | Age: 39
End: 2024-08-28
Payer: COMMERCIAL

## 2024-08-28 DIAGNOSIS — F45.22 BODY DYSMORPHIC DISORDER: ICD-10-CM

## 2024-08-28 DIAGNOSIS — F32.A DEPRESSION, UNSPECIFIED DEPRESSION TYPE: ICD-10-CM

## 2024-08-28 PROCEDURE — 3044F HG A1C LEVEL LT 7.0%: CPT | Mod: CPTII,S$GLB,, | Performed by: COUNSELOR

## 2024-08-28 PROCEDURE — 90791 PSYCH DIAGNOSTIC EVALUATION: CPT | Mod: S$GLB,,, | Performed by: COUNSELOR

## 2024-08-28 PROCEDURE — 99999 PR PBB SHADOW E&M-EST. PATIENT-LVL I: CPT | Mod: PBBFAC,,, | Performed by: COUNSELOR

## 2024-08-28 NOTE — PROGRESS NOTES
Primary Care Behavioral Health Integration: Initial  Date:  8/28/2024  Referral Source:  Clay Lacy DO  Length of Appointment: 30    Chief Complaint/Reason for Encounter:  Anxiety, Depression, and BDD    History of Present Illness: Carmina Hernandez, a 38 y.o. female referred by Clay Lacy DO.  Patient was seen, examined and chart was reviewed. Met with patient.     Current Session: Patient reported having body dysmorphia. Pt believes she's obese and thinks she wears a size 16. In reality she wears 6-8. Will grab bigger clothes on rack. Tries on multiple sizes until she gets down to her true size. But still doesn't believe it. Goes to the gym everyday but feels like it isn't helping. Is obsessed with food and exercise. Even puts it on her 12 yo daughter. Pt constantly monitors what daughter eats and exercise level. Pt will not take photos even with daughter. When pt sees herself in the mirror she doesn't believe it's her.  is a pharmacist so pt stays at home. Discussed getting a part-time job. Advised to start journaling and doing something good for herself every day.    No past medical history on file.      Current Outpatient Medications:     apraclonidine 0.5% (IOPIDINE) 0.5 % Drop, Apply 1 drop to the affected eye twice daily for one week., Disp: 5 mL, Rfl: 0    apraclonidine 0.5% (IOPIDINE) 0.5 % Drop, apply 1 drop to the affected eye twice daily for 1 week, Disp: 5 mL, Rfl: 0    hydroCHLOROthiazide (HYDRODIURIL) 12.5 MG Tab, Take 1 tablet (12.5 mg total) by mouth daily as needed (leg swelling)., Disp: 30 tablet, Rfl: 0    L norgest/e.estradioL-e.estrad (SEASONIQUE) 0.15 mg-30 mcg (84)/10 mcg (7) 3MPk, Take 1 tablet by mouth once daily., Disp: 1 each, Rfl: 4    Current symptoms:  Depression Symptoms: worthlessness/guilt.  Anxiety Symptoms: excessive worrying and restlessness.  Sleep Difficulties:  denies .  Manic Symptoms:  denies.  Psychosis: denies .    Risk assessment:  Patient  reports no suicidal ideation  Patient reports no homicidal ideation  Patient reports no self-injurious behavior  Patient reports no violent behavior    Patient advised to call [phone] or present the the nearest ED if they experience suicidal or homicidal ideation, plan or intent.      Psychiatric History:  Diagnosis:    Current Psychiatric Medication: No     Medication Trial History:  Medication Trials: No    Outpatient Treatment: Yes    Inpatient Treatment: No   Suicide Attempts: Yes - At 11 took a bunch of aspirin   Access to Firearms: No   History of Trauma: Yes   Family Psychiatric History:      Current and Past Substance Use:  Alcohol: Patient denies alcohol use.    Drugs: Denied.   Nicotine: denied   Caffeine:  denies     Mental Status Exam  General Appearance:  appears stated age, neatly dressed, well groomed   Speech: normal tone, normal rate, normal pitch, normal volume      Level of Cooperation: cooperative      Thought Processes: linear, logical, goal-directed   Mood: sad      Thought Content: {relevant and appropriate   Affect: congruent and appropriate   Orientation: Oriented x4   Memory/Attention/Concentration: No gross cognitive deficits made evident during conversation   Judgment & Insight: poor   Language  intact         2/20/2023    12:35 PM 12/5/2022     1:48 PM   Results of the PHQ8   Little interest or pleasure in doing things Not at all Several days   Feeling down, depressed, or hopeless Not at all Not at all   Trouble falling or staying asleep, or sleeping too much Not at all Several days   Feeling tired or having little energy Several days Several days   Poor appetite or overeating Several days Several days   Feeling bad about yourself - or that you are a failure or have let yourself or your family down Not at all Not at all   Trouble concentrating on things, such as reading the newspaper or watching television Not at all Not at all   Moving or speaking so slowly that other people could have  noticed. Or the opposite - being so fidgety or restless that you have been moving around a lot more than usual Not at all Not at all   Total Score  2 4           2/20/2023    12:34 PM 12/5/2022     1:47 PM   GAD7   1. Feeling nervous, anxious, or on edge? 0 0   2. Not being able to stop or control worrying? 0 0   3. Worrying too much about different things? 0 0   4. Trouble relaxing? 0 0   5. Being so restless that it is hard to sit still? 0 0   6. Becoming easily annoyed or irritable? 0 0   7. Feeling afraid as if something awful might happen? 0 0   8. If you checked off any problems, how difficult have these problems made it for you to do your work, take care of things at home, or get along with other people? 0 0   BECCA-7 Score 0 0       Impression: Initial appointment focused on gathering history, identifying treatment goals and developing a treatment plan.      Diagnosis:  1. Depression, unspecified depression type  Ambulatory referral/consult to Primary Care Behavioral Health (Non-Opioids)    Therapy recommended.  Behavioral Health consultation placed      2. Body dysmorphic disorder  Ambulatory referral/consult to Primary Care Behavioral Health (Non-Opioids)    Therapy recommended. Behavioral Health consultation placed          Treatment Goals and Plan:   Depression: increasing motivation and reducing negative automatic thoughts    Future treatment will utilize CBT.      Return to Clinic: No follow-ups on file.

## 2024-08-29 ENCOUNTER — PATIENT OUTREACH (OUTPATIENT)
Dept: PSYCHIATRY | Facility: CLINIC | Age: 39
End: 2024-08-29
Payer: COMMERCIAL

## 2024-08-29 ENCOUNTER — TELEPHONE (OUTPATIENT)
Dept: PSYCHIATRY | Facility: CLINIC | Age: 39
End: 2024-08-29
Payer: COMMERCIAL

## 2024-09-06 ENCOUNTER — TELEPHONE (OUTPATIENT)
Dept: PSYCHIATRY | Facility: CLINIC | Age: 39
End: 2024-09-06
Payer: COMMERCIAL

## 2024-09-06 ENCOUNTER — PATIENT OUTREACH (OUTPATIENT)
Dept: PSYCHIATRY | Facility: CLINIC | Age: 39
End: 2024-09-06
Payer: COMMERCIAL

## 2024-09-16 ENCOUNTER — OFFICE VISIT (OUTPATIENT)
Dept: BEHAVIORAL HEALTH | Facility: CLINIC | Age: 39
End: 2024-09-16
Payer: COMMERCIAL

## 2024-09-16 DIAGNOSIS — F32.A DEPRESSION, UNSPECIFIED DEPRESSION TYPE: Primary | ICD-10-CM

## 2024-09-16 DIAGNOSIS — F45.22 BODY DYSMORPHIC DISORDER: ICD-10-CM

## 2024-09-16 DIAGNOSIS — F33.0 MDD (MAJOR DEPRESSIVE DISORDER), RECURRENT EPISODE, MILD: ICD-10-CM

## 2024-09-16 PROCEDURE — 3044F HG A1C LEVEL LT 7.0%: CPT | Mod: CPTII,S$GLB,, | Performed by: COUNSELOR

## 2024-09-16 PROCEDURE — 90832 PSYTX W PT 30 MINUTES: CPT | Mod: S$GLB,,, | Performed by: COUNSELOR

## 2024-09-16 NOTE — PROGRESS NOTES
Primary Care Behavioral Health Integration: Follow-up  Date:  9/16/2024  Patient Name: Carmina Hernandez  Type of Visit:  In person  Site:  [facility]  Referral Source:  Clay Lacy DO    History of Present Illness:  Carmina Hernandez, a 38 y.o.  female with history of Major Depressive Disorder, Recurrent, Moderate (F33.1) and body dysmorphic disorder  referred by Clay Lacy DO.  Patient was seen, examined and chart was reviewed.    Met with patient.     Chief complaint/reason for encounter: depression     Current session: Patient reported telling herself positive things in the mirror but said negative thoughts keep following. Used Im[employer] to work on body image and feelings of sadness. Will use this therapy again next time for childhood bullying/shame/trauma. Reports negative body image thoughts prevent her from enjoying her life and going to events where she believes others will be looking at her or judging her. Discussed with  going back to work in which he wasn't in favor of. Pt says she misses interactions with people. Will look into hobbies or taking a class. 10 yo daughter has ADHD and wears out pt. She always wants to be active and stimulated and wants pt to constantly play with her or take her places.    Treatment plan:  Target symptoms: depression  Why chosen therapy is appropriate versus another modality: relevant to diagnosis  Outcome monitoring methods: self-report, observation  Therapeutic intervention type: supportive psychotherapy    Risk parameters:  Patient reports no suicidal ideation  Patient reports no homicidal ideation  Patient reports no self-injurious behavior  Patient reports no violent behavior    Verbal deficits: None    Patient's response to intervention:  The patient's response to intervention is accepting.    Progress toward goals and other mental status changes:  The patient's progress toward goals is not progressing.    Patient advised to call [phone]  or present the the nearest ED if they experience suicidal or homicidal ideation, plan or intent.          2/20/2023    12:35 PM 12/5/2022     1:48 PM   Results of the PHQ8   Little interest or pleasure in doing things Not at all Several days   Feeling down, depressed, or hopeless Not at all Not at all   Trouble falling or staying asleep, or sleeping too much Not at all Several days   Feeling tired or having little energy Several days Several days   Poor appetite or overeating Several days Several days   Feeling bad about yourself - or that you are a failure or have let yourself or your family down Not at all Not at all   Trouble concentrating on things, such as reading the newspaper or watching television Not at all Not at all   Moving or speaking so slowly that other people could have noticed. Or the opposite - being so fidgety or restless that you have been moving around a lot more than usual Not at all Not at all   Total Score  2 4           2/20/2023    12:34 PM 12/5/2022     1:47 PM   GAD7   1. Feeling nervous, anxious, or on edge? 0 0   2. Not being able to stop or control worrying? 0 0   3. Worrying too much about different things? 0 0   4. Trouble relaxing? 0 0   5. Being so restless that it is hard to sit still? 0 0   6. Becoming easily annoyed or irritable? 0 0   7. Feeling afraid as if something awful might happen? 0 0   8. If you checked off any problems, how difficult have these problems made it for you to do your work, take care of things at home, or get along with other people? 0 0   BECCA-7 Score 0 0       Diagnosis:     ICD-10-CM ICD-9-CM   1. Depression, unspecified depression type  F32.A 311   2. Body dysmorphic disorder  F45.22 300.7   3. MDD (major depressive disorder), recurrent episode, mild  F33.0 296.31       Plan: Pt plans to continue individual therapy.    Return to clinic: 2 weeks    Length of Service (minutes): 30

## 2024-10-02 ENCOUNTER — OFFICE VISIT (OUTPATIENT)
Dept: BEHAVIORAL HEALTH | Facility: CLINIC | Age: 39
End: 2024-10-02
Payer: COMMERCIAL

## 2024-10-02 DIAGNOSIS — F33.0 MDD (MAJOR DEPRESSIVE DISORDER), RECURRENT EPISODE, MILD: ICD-10-CM

## 2024-10-02 DIAGNOSIS — F45.22 BODY DYSMORPHIC DISORDER: ICD-10-CM

## 2024-10-02 DIAGNOSIS — F41.1 GAD (GENERALIZED ANXIETY DISORDER): Primary | ICD-10-CM

## 2024-10-02 PROCEDURE — 90832 PSYTX W PT 30 MINUTES: CPT | Mod: S$GLB,,, | Performed by: COUNSELOR

## 2024-10-02 PROCEDURE — 3044F HG A1C LEVEL LT 7.0%: CPT | Mod: CPTII,S$GLB,, | Performed by: COUNSELOR

## 2024-10-02 NOTE — PROGRESS NOTES
Primary Care Behavioral Health Integration: Follow-up  Date:  10/2/2024  Patient Name: Carmina Hernandez  Type of Visit:  In person  Site:  [facility]  Referral Source:  Clay Lacy DO    History of Present Illness:  Carmina Hernandez, a 38 y.o.  female with history of Major Depressive Disorder, Recurrent, Moderate (F33.1) and body dysmorphic disorder  referred by Clay Lacy DO.  Patient was seen, examined and chart was reviewed.    Met with patient.     Chief complaint/reason for encounter: depression     Current session: Patient reported having a rough week. Started last Saturday when she had daughter's birthday. Took 2 of her friends and one mom to an [employer]. The girls isolated pt's daughter and rode rides without her. Then they went to a restaurant where pt spent over $400 on everyone. One of the friends didn't give a present but followed up yesterday with a $10 gift card. Pt was upset bc that parent usually gives more to other kids. Pt reported seeing someone pulling on her car door in her security camera. Feels violated and scared. Wants to move to [place] in 2 yrs when daughter starts high school. Suggested to do research on towns/schools in that area and then discuss with . He's not opposed to moving there already but it will depend on if he can find another job as a pharmacist making what he's making now. Pt looks into classes at [employer] which is near their home.    Treatment plan:  Target symptoms: depression  Why chosen therapy is appropriate versus another modality: relevant to diagnosis  Outcome monitoring methods: self-report, observation  Therapeutic intervention type: supportive psychotherapy    Risk parameters:  Patient reports no suicidal ideation  Patient reports no homicidal ideation  Patient reports no self-injurious behavior  Patient reports no violent behavior    Verbal deficits: None    Patient's response to intervention:  The patient's response  to intervention is accepting.    Progress toward goals and other mental status changes:  The patient's progress toward goals is limited.    Patient advised to call [phone] or present the the nearest ED if they experience suicidal or homicidal ideation, plan or intent.          2/20/2023    12:35 PM 12/5/2022     1:48 PM   Results of the PHQ8   Little interest or pleasure in doing things Not at all Several days   Feeling down, depressed, or hopeless Not at all Not at all   Trouble falling or staying asleep, or sleeping too much Not at all Several days   Feeling tired or having little energy Several days Several days   Poor appetite or overeating Several days Several days   Feeling bad about yourself - or that you are a failure or have let yourself or your family down Not at all Not at all   Trouble concentrating on things, such as reading the newspaper or watching television Not at all Not at all   Moving or speaking so slowly that other people could have noticed. Or the opposite - being so fidgety or restless that you have been moving around a lot more than usual Not at all Not at all   Total Score  2 4           2/20/2023    12:34 PM 12/5/2022     1:47 PM   GAD7   1. Feeling nervous, anxious, or on edge? 0  0    2. Not being able to stop or control worrying? 0  0    3. Worrying too much about different things? 0  0    4. Trouble relaxing? 0  0    5. Being so restless that it is hard to sit still? 0  0    6. Becoming easily annoyed or irritable? 0  0    7. Feeling afraid as if something awful might happen? 0  0    8. If you checked off any problems, how difficult have these problems made it for you to do your work, take care of things at home, or get along with other people? 0  0    BECCA-7 Score 0 0       Patient-reported       Diagnosis:     ICD-10-CM ICD-9-CM   1. BECCA (generalized anxiety disorder)  F41.1 300.02   2. MDD (major depressive disorder), recurrent episode, mild  F33.0 296.31   3. Body dysmorphic  disorder  F45.22 300.7         Plan: Pt plans to continue individual therapy.    Return to clinic: 2 weeks    Length of Service (minutes): 30

## 2024-10-03 ENCOUNTER — PATIENT MESSAGE (OUTPATIENT)
Dept: OBSTETRICS AND GYNECOLOGY | Facility: CLINIC | Age: 39
End: 2024-10-03
Payer: COMMERCIAL

## 2024-10-03 DIAGNOSIS — N93.9 ABNORMAL UTERINE BLEEDING (AUB): Primary | ICD-10-CM

## 2024-10-03 RX ORDER — NORETHINDRONE 5 MG/1
TABLET ORAL
Qty: 66 TABLET | Refills: 0 | Status: SHIPPED | OUTPATIENT
Start: 2024-10-03

## 2024-10-07 ENCOUNTER — PATIENT MESSAGE (OUTPATIENT)
Dept: BEHAVIORAL HEALTH | Facility: CLINIC | Age: 39
End: 2024-10-07
Payer: COMMERCIAL

## 2025-03-11 ENCOUNTER — OFFICE VISIT (OUTPATIENT)
Dept: OBSTETRICS AND GYNECOLOGY | Facility: CLINIC | Age: 40
End: 2025-03-11
Payer: MEDICAID

## 2025-03-11 VITALS
WEIGHT: 159.38 LBS | RESPIRATION RATE: 18 BRPM | DIASTOLIC BLOOD PRESSURE: 78 MMHG | HEIGHT: 62 IN | BODY MASS INDEX: 29.33 KG/M2 | SYSTOLIC BLOOD PRESSURE: 126 MMHG

## 2025-03-11 DIAGNOSIS — N92.0 MENORRHAGIA WITH REGULAR CYCLE: ICD-10-CM

## 2025-03-11 DIAGNOSIS — N93.9 ABNORMAL UTERINE BLEEDING (AUB): ICD-10-CM

## 2025-03-11 DIAGNOSIS — N95.1 VASOMOTOR SYMPTOMS DUE TO MENOPAUSE: ICD-10-CM

## 2025-03-11 DIAGNOSIS — D21.9 LEIOMYOMA: ICD-10-CM

## 2025-03-11 DIAGNOSIS — N85.8 OTHER SPECIFIED NONINFLAMMATORY DISORDERS OF UTERUS: Primary | ICD-10-CM

## 2025-03-11 DIAGNOSIS — Z01.419 WELL WOMAN EXAM: Primary | ICD-10-CM

## 2025-03-11 PROCEDURE — 3074F SYST BP LT 130 MM HG: CPT | Mod: CPTII,,, | Performed by: GENERAL PRACTICE

## 2025-03-11 PROCEDURE — 99395 PREV VISIT EST AGE 18-39: CPT | Mod: S$PBB,,, | Performed by: GENERAL PRACTICE

## 2025-03-11 PROCEDURE — 3078F DIAST BP <80 MM HG: CPT | Mod: CPTII,,, | Performed by: GENERAL PRACTICE

## 2025-03-11 PROCEDURE — 99999 PR PBB SHADOW E&M-EST. PATIENT-LVL III: CPT | Mod: PBBFAC,,, | Performed by: GENERAL PRACTICE

## 2025-03-11 PROCEDURE — 1159F MED LIST DOCD IN RCRD: CPT | Mod: CPTII,,, | Performed by: GENERAL PRACTICE

## 2025-03-11 PROCEDURE — 99213 OFFICE O/P EST LOW 20 MIN: CPT | Mod: PBBFAC,PO | Performed by: GENERAL PRACTICE

## 2025-03-11 PROCEDURE — 3008F BODY MASS INDEX DOCD: CPT | Mod: CPTII,,, | Performed by: GENERAL PRACTICE

## 2025-03-11 PROCEDURE — 87591 N.GONORRHOEAE DNA AMP PROB: CPT | Performed by: GENERAL PRACTICE

## 2025-03-11 RX ORDER — TRETINOIN 0.5 MG/G
1 CREAM TOPICAL NIGHTLY
COMMUNITY
Start: 2025-02-12

## 2025-03-11 RX ORDER — NORETHINDRONE 5 MG/1
TABLET ORAL
Qty: 66 TABLET | Refills: 3 | Status: SHIPPED | OUTPATIENT
Start: 2025-03-11

## 2025-03-11 NOTE — PROGRESS NOTES
Background   2024  Carmina Hernandez is a 38 y.o. here for f/u of ultrasound results.  Previously seen by Ms. Weldon in our clinic who ordered the ultrasound b/c the patient's uterus felt enlarged on exam.       ASSESSMENT AND PLAN     2025  39 y.o. with continued HMB and dysmenorrhea / dyspareunia from fibroid uterus.  VMS at night and low libido.    Pelvic US ordered to note size/number of fibroids.  Referral to Int Radiology in consideration of [place]  Rx: norethindrone for cyclic administration in the meantime - can continue if happy with this regimen  f/u results of CT/NG testing, hormone levels, CBC, TSH, iron studies, d3 LH/FSH/E2, and total testosterone and SHBG (for GISELE)  Cervical Cancer screening: next cervical CA screen (PAP+HPV) due MAR 2029  Mammogram: age 41  Encouraged self breast awareness; RTC for breast concerns  Return to clinic: for annual GYN check-up, sooner prn or sooner prn      SUBJECTIVE     2025  Carmina Hernandez is a 39 y.o. here for WWE.  Has on-going HMB and dysmenorrhea (which has been for past couple of years).  Tried 4 months of TANYA and had near-daily bleeding, was very heavy.  Then took single 21 day course of Norethindrone 5mg to prevent bleeding on vacation.  That worked well for her.  She definitely is not interested in Mirena IUD, Depo, or hysterectomy.    Full medical history reviewed and updated today (below).     G'sP's:   LMP: Patient's last menstrual period was 02/15/2025 (exact date).  Relationship:  and sexually active  Contraception: Vasectomy  PAP Hx: no h/o abnormals  LAST PAP: 3/20/2024: PAP neg / HPV neg      OBJECTIVE     PHYSICAL EXAM  Vitals:    25 1032   BP: 126/78   Resp: 18     GEN = alert/oriented, nad, pleasant  HEENT = sclera anicteric, EOM grossly normal  BREASTS = nontender, no suspicious masses palpated, no suspicious skin changes, no nipple discharge, large scar noted on upper mid-chest  CV = BP and HR as per vitals  PULM  = normal respiratory effort   =      External: nefg, no lesions     Vagina: normal and without lesions and urethral meatus normal     Discharge: normal and physiologic     Cervix: normal-appearing and CT/NG swab obtained     Bimanual: uterus enlarged (10-12wks) and ttp, diffusely ttp with bimanual exam, and organs not mobile     LABS AND RADS    Lab Results   Component Value Date    WBC 6.42 03/04/2024    HGB 11.8 (L) 03/04/2024    HCT 34.9 (L) 03/04/2024     03/04/2024    MCV 91 03/04/2024      Lab Results   Component Value Date    TSH 0.572 03/04/2024      Lab Results   Component Value Date    LABCHLA Not Detected 03/04/2024    LABNGO Not Detected 03/04/2024     PELVIC US (19 MAR 2024) =  Uterus 12  cm  EMS 15 mm  ROV [id number]  LOV 3x2x3 cm  Other: multiple fibroids, largest are 3 and 8cm <-- in 2020 largest was 3cm    Kellen Phillip MD    03/11/2025 :    GYNECOLOGIC HISTORY  PAP Hx: no h/o abnormals  Genital HSV: No  Other STD Hx: No    Menarche: 12yoa  Bleeding -- Period duration: 5-6 days / # Heavy Days: 4 / Pad/tampon ? on heavy days: 10 a day --> for past couple of years / Intermenstrual bleeding: No / Period frequency:regular every 28-30 days  Pain -- Dysmenorrhea: Yes - only within the last couple of years / Non-menstrual pelvic pressure/pain: No / Dyspareunia: Yes: every time, deep, tried physical therapy in 2020  Other -- Vasomotor Sxs: yes, typically 2 per night / Vaginal dryness: denies    OBSTETRIC HISTORY  Living children: 11yo daughter  Vaginal deliveries: 1    SOCIAL HISTORY  Lives with:  and daughter   is a pharmacist  Smoker: non-smoker / Alcohol: denies / Drugs: denies  Domestic Violence: No  Occupation:  homemaker, has masters in Ed    PAST MEDICAL HISTORY  No past medical history on file.    PAST SURGICAL HISTORY  ----------------------------    Keloid excision    chest     ALLERGIES  Review of patient's allergies indicates:   Allergen Reactions    Sulfa (sulfonamide  antibiotics)      MEDICATIONS  Current Outpatient Medications   Medication Instructions    hydroCHLOROthiazide 12.5 mg, Oral, Daily PRN    norethindrone (AYGESTIN) 5 mg Tab Take 1 tablet by mouth 21 days out of a 28-day cycle.    prenatal no115/iron/folic acid (PRENATAL 19 ORAL) Take by mouth.    tretinoin (RETIN-A) 0.05 % cream 1 application , Nightly   --> takes HCTZ prn edematous legs    FAMILY HISTORY  BLEEDING or CLOTTING DISORDERS: none  BREAST CA: maternal aunt / UTERINE CA: none / OVARIAN CA: none  COLON CA: none

## 2025-03-11 NOTE — PATIENT INSTRUCTIONS
"ULTRASOUND, MAMMOGRAMS, CT SCANS:  If the treatment plan is simple or unchanged based on the study result, I'll probably send a message in [facility].    If the treatment plan is complex and/or the study result is concerning, I'll call you to discuss.  In some cases, I'll have my staff schedule an appointment for you to come in to discuss things in person.       GENERAL BLOOD TESTS:  Many parts of a blood test can be flagged as "abnormal" by the system, but based on your age and other factors, it might be considered normal.    If your test is normal, and no follow-up is needed, you will not get a message from me.    If your test is abnormal, I typically will send you a message in [facility] with recommendations (starting a medication, repeating the test, checking other tests, etc.).  Sometimes one of my staff members or I will call you.     STD AND VAGINITIS TESTING:  If you are enrolled in [facility], I will trust that when you see a negative result, you understand that means you do not have the particular infection or STD we were checking for.  You will not get a message from me.    If something comes back positive, one of my staff members or I will call you to let you know about the result and what the recommended treatment is.  For most STD's, it is VERY important that you do not have sex until both you and your partner(s) have completed treatment for the STD.  I cannot prescribe medications for your partner(s).    Bacterial Vaginitis (BV) and vaginal yeast infections (Candida, for example) are not STD's.    Trichomonas is an STD.    If you are prescribed an antibiotic, it is very important that you take all of the medicine as prescribed.  Incomplete treatment can cause a relapse and/or antibiotic resistance.     "

## 2025-03-12 LAB
C TRACH DNA SPEC QL NAA+PROBE: NOT DETECTED
N GONORRHOEA DNA SPEC QL NAA+PROBE: NOT DETECTED

## 2025-03-13 ENCOUNTER — HOSPITAL ENCOUNTER (OUTPATIENT)
Dept: RADIOLOGY | Facility: HOSPITAL | Age: 40
Discharge: HOME | End: 2025-03-13
Attending: GENERAL PRACTICE
Payer: MEDICAID

## 2025-03-13 DIAGNOSIS — D21.9 LEIOMYOMA: ICD-10-CM

## 2025-03-13 DIAGNOSIS — N92.0 MENORRHAGIA WITH REGULAR CYCLE: ICD-10-CM

## 2025-03-13 PROCEDURE — 76856 US EXAM PELVIC COMPLETE: CPT | Mod: TC,PO

## 2025-03-13 PROCEDURE — 76856 US EXAM PELVIC COMPLETE: CPT | Mod: 26,,, | Performed by: RADIOLOGY

## 2025-03-18 ENCOUNTER — TELEPHONE (OUTPATIENT)
Dept: INTERVENTIONAL RADIOLOGY/VASCULAR | Facility: CLINIC | Age: 40
End: 2025-03-18
Payer: MEDICAID

## 2025-03-18 ENCOUNTER — PATIENT MESSAGE (OUTPATIENT)
Dept: OBSTETRICS AND GYNECOLOGY | Facility: CLINIC | Age: 40
End: 2025-03-18
Payer: COMMERCIAL

## 2025-03-23 ENCOUNTER — RESULTS FOLLOW-UP (OUTPATIENT)
Dept: GYNECOLOGY | Facility: HOSPITAL | Age: 40
End: 2025-03-23

## 2025-03-25 ENCOUNTER — HOSPITAL ENCOUNTER (OUTPATIENT)
Dept: RADIOLOGY | Facility: OTHER | Age: 40
Discharge: HOME | End: 2025-03-25
Attending: RADIOLOGY
Payer: COMMERCIAL

## 2025-03-25 DIAGNOSIS — N85.8 OTHER SPECIFIED NONINFLAMMATORY DISORDERS OF UTERUS: ICD-10-CM

## 2025-03-25 PROCEDURE — A9585 GADOBUTROL INJECTION: HCPCS | Performed by: RADIOLOGY

## 2025-03-25 PROCEDURE — 25500020 PHARM REV CODE 255: Performed by: RADIOLOGY

## 2025-03-25 PROCEDURE — 72197 MRI PELVIS W/O & W/DYE: CPT | Mod: 26,,, | Performed by: RADIOLOGY

## 2025-03-25 PROCEDURE — 72197 MRI PELVIS W/O & W/DYE: CPT | Mod: TC

## 2025-03-25 RX ORDER — GADOBUTROL 604.72 MG/ML
7 INJECTION INTRAVENOUS
Status: COMPLETED | OUTPATIENT
Start: 2025-03-25 | End: 2025-03-25

## 2025-03-25 RX ADMIN — GADOBUTROL 7 ML: 604.72 INJECTION INTRAVENOUS at 10:03

## 2025-03-27 ENCOUNTER — OFFICE VISIT (OUTPATIENT)
Dept: INTERVENTIONAL RADIOLOGY/VASCULAR | Facility: CLINIC | Age: 40
End: 2025-03-27
Payer: COMMERCIAL

## 2025-03-27 VITALS
HEART RATE: 64 BPM | DIASTOLIC BLOOD PRESSURE: 66 MMHG | SYSTOLIC BLOOD PRESSURE: 104 MMHG | HEIGHT: 62 IN | RESPIRATION RATE: 16 BRPM | BODY MASS INDEX: 29.53 KG/M2 | WEIGHT: 160.5 LBS

## 2025-03-27 DIAGNOSIS — D21.9 FIBROID: Primary | ICD-10-CM

## 2025-03-27 PROCEDURE — 99999 PR PBB SHADOW E&M-EST. PATIENT-LVL III: CPT | Mod: PBBFAC,,, | Performed by: FAMILY MEDICINE

## 2025-03-27 NOTE — LETTER
March 27, 2025    Carmina Hernandez  [address] [facility] [address]     [facility] [address]  [address]  [facility] [address]  Phone: [phone] PRE-PROCEDURE INSTRUCTIONS    Your procedure with Interventional Radiology is scheduled for _______________________.     Please arrive by _______________________. Please note your appointment time in [facility] will be different.    You must check-in and receive a wristband before going to your procedure. We will call you the day before to let you know your check-in location.    **Do not eat or drink anything between midnight and the time of your procedure. This includes gum, mints, and candy lemon drops.    **Do not smoke or drink alcoholic beverages 24 hours prior to your procedure.    **If you wear contact lenses, dentures, hearing aids, or glasses, bring a container to put them in during the procedure and give them to a family member for safekeeping.    **If you have been diagnosed with sleep apnea please bring your CPAP machine.    **If your doctor has scheduled you for an overnight stay, bring a small overnight bag with any personal items that you may need.    **Make arrangements in advance for transportation home by a responsible adult. It is not safe to drive a vehicle during the 24 hours following the procedure.    **All [facility] facilities and properties are tobacco free. Smoking is NOT allowed.    PLEASE NOTE: The procedure schedule has many variables which affect the time of your procedure. Family members should be available if your surgery time changes.    If you have any questions about these instructions call Interventional Radiology at [phone] Monday - Friday between 8:00am and 4:00pm or [phone] (ask for interventional radiology physician) for after hours.

## 2025-03-28 ENCOUNTER — RESULTS FOLLOW-UP (OUTPATIENT)
Dept: OBSTETRICS AND GYNECOLOGY | Facility: CLINIC | Age: 40
End: 2025-03-28

## 2025-03-28 NOTE — PROGRESS NOTES
Carmina,    I see you're scheduled for uterine artery embolization.  Someone from the office will help you schedule a follow-up appointment with me 8-12 weeks after your procedure so we can go over next steps for managing your other symptoms.    Sincerely,  Dr. Phillip

## 2025-03-28 NOTE — TELEPHONE ENCOUNTER
Left message for pt to call back to schedule appt 8-12 weeks after uterine artery embolization on 4/28/25. Will follow up with [email or web address] message.

## 2025-03-31 ENCOUNTER — TELEPHONE (OUTPATIENT)
Dept: OBSTETRICS AND GYNECOLOGY | Facility: CLINIC | Age: 40
End: 2025-03-31
Payer: COMMERCIAL

## 2025-03-31 NOTE — TELEPHONE ENCOUNTER
----- Message from Jarrett sent at 3/31/2025  9:19 AM CDT -----  Type:  Sooner Appointment RequestCaller is requesting a sooner appointment.  Caller declined first available appointment listed below.  Caller will not accept being placed on the waitlist and is requesting a message be sent to doctor.Name of Caller:  ptWhen is the first available appointment?  N/ASymptoms:   post-opWould the patient rather a call back or a response via [facility]? CallBe Call Back Number:  [phone] Information:  Please call back to advise. Thanks!

## 2025-04-16 ENCOUNTER — OFFICE VISIT (OUTPATIENT)
Dept: FAMILY MEDICINE | Facility: CLINIC | Age: 40
End: 2025-04-16
Payer: COMMERCIAL

## 2025-04-16 VITALS
HEART RATE: 69 BPM | WEIGHT: 162.69 LBS | BODY MASS INDEX: 29.94 KG/M2 | DIASTOLIC BLOOD PRESSURE: 60 MMHG | SYSTOLIC BLOOD PRESSURE: 115 MMHG | HEIGHT: 62 IN | OXYGEN SATURATION: 99 %

## 2025-04-16 DIAGNOSIS — M79.89 LOCALIZED SWELLING OF LOWER EXTREMITY: ICD-10-CM

## 2025-04-16 DIAGNOSIS — D21.9 FIBROIDS: ICD-10-CM

## 2025-04-16 DIAGNOSIS — R79.0 LOW FERRITIN: ICD-10-CM

## 2025-04-16 DIAGNOSIS — Z00.00 ENCOUNTER FOR ANNUAL GENERAL MEDICAL EXAMINATION WITHOUT ABNORMAL FINDINGS IN ADULT: Primary | ICD-10-CM

## 2025-04-16 PROCEDURE — 1160F RVW MEDS BY RX/DR IN RCRD: CPT | Mod: CPTII,S$GLB,, | Performed by: STUDENT IN AN ORGANIZED HEALTH CARE EDUCATION/TRAINING PROGRAM

## 2025-04-16 PROCEDURE — 99999 PR PBB SHADOW E&M-EST. PATIENT-LVL III: CPT | Mod: PBBFAC,,, | Performed by: STUDENT IN AN ORGANIZED HEALTH CARE EDUCATION/TRAINING PROGRAM

## 2025-04-16 PROCEDURE — 3008F BODY MASS INDEX DOCD: CPT | Mod: CPTII,S$GLB,, | Performed by: STUDENT IN AN ORGANIZED HEALTH CARE EDUCATION/TRAINING PROGRAM

## 2025-04-16 PROCEDURE — 3078F DIAST BP <80 MM HG: CPT | Mod: CPTII,S$GLB,, | Performed by: STUDENT IN AN ORGANIZED HEALTH CARE EDUCATION/TRAINING PROGRAM

## 2025-04-16 PROCEDURE — 99395 PREV VISIT EST AGE 18-39: CPT | Mod: S$GLB,,, | Performed by: STUDENT IN AN ORGANIZED HEALTH CARE EDUCATION/TRAINING PROGRAM

## 2025-04-16 PROCEDURE — 1159F MED LIST DOCD IN RCRD: CPT | Mod: CPTII,S$GLB,, | Performed by: STUDENT IN AN ORGANIZED HEALTH CARE EDUCATION/TRAINING PROGRAM

## 2025-04-16 PROCEDURE — 3074F SYST BP LT 130 MM HG: CPT | Mod: CPTII,S$GLB,, | Performed by: STUDENT IN AN ORGANIZED HEALTH CARE EDUCATION/TRAINING PROGRAM

## 2025-04-16 NOTE — PROGRESS NOTES
SUBJECTIVE:    CHIEF COMPLAINT:   Chief Complaint   Patient presents with    Follow-up           274}    HISTORY OF PRESENT ILLNESS:  History of Present Illness    CHIEF COMPLAINT:  Ms. Hernandez presents for a follow-up visit to discuss upcoming fibroid procedure and recent lab results.    HPI:  Ms. Hernandez is a 39F with PMHx below here for annual. She reports heavy menstrual bleeding due to fibroids, which has somewhat improved with contraceptive therapy.. She is scheduled for an ablation procedure on the  to address this issue.     She discusses recent lab results, particularly her ferritin levels.  She is currently taking prenatal vitamins to help with iron supplementation, as she noticed that iron supplements alone caused constipation.    She reports frequent cravings for sugary foods such as cupcakes, cookies, and candy, and expresses concern about these persistent urges.      ROS:  Negative except as noted above.      PAST MEDICAL HISTORY:     274}  History reviewed. No pertinent past medical history.    PAST SURGICAL HISTORY:  Past Surgical History:   Procedure Laterality Date    KELOID EXCISION      chest       SOCIAL HISTORY:  Social History[1]    FAMILY HISTORY:       Family History   Problem Relation Name Age of Onset    Dementia Mother      Hypertension Father      No Known Problems Daughter      Breast cancer Maternal Aunt      Cancer Neg Hx      Colon cancer Neg Hx      Diabetes Neg Hx      Eclampsia Neg Hx      Miscarriages / Stillbirths Neg Hx      Ovarian cancer Neg Hx       labor Neg Hx      Stroke Neg Hx      Glaucoma Neg Hx      Macular degeneration Neg Hx      Retinal detachment Neg Hx         ALLERGIES AND MEDICATIONS: updated and reviewed.      274}  Review of patient's allergies indicates:   Allergen Reactions    Sulfa (sulfonamide antibiotics)      Medication List with Changes/Refills   Current Medications    HYDROCHLOROTHIAZIDE (HYDRODIURIL) 12.5 MG TAB    Take 1 tablet (12.5 mg  "total) by mouth daily as needed (leg swelling).    NORETHINDRONE (AYGESTIN) 5 MG TAB    Take 1 tablet by mouth only on cycle days 5-26 each menstrual cycle. Cycle day 1 is the first day of your period.    PRENATAL /IRON/FOLIC ACID (PRENATAL 19 ORAL)    Take by mouth.    TRETINOIN (RETIN-A) 0.05 % CREAM    Apply 1 application  topically every evening.       SCREENING HISTORY:    274}  Health Maintenance         Date Due Completion Date    Influenza Vaccine (1) 09/01/2024 10/20/2022    COVID-19 Vaccine (5 - 2024-25 season) 09/01/2024 3/19/2021    Hemoglobin A1c (Diabetic Prevention Screening) 03/04/2027 3/4/2024    Cervical Cancer Screening 03/12/2029 3/12/2024    TETANUS VACCINE 08/15/2029 8/15/2019    RSV Vaccine (Age 60+ and Pregnant patients) (1 - 1-dose 75+ series) 12/30/2061 ---                  PHYSICAL EXAM:      274}  /60   Pulse 69   Ht 5' 2" (1.575 m)   Wt 73.8 kg (162 lb 11.2 oz)   SpO2 99%   BMI 29.76 kg/m²   Wt Readings from Last 3 Encounters:   04/16/25 73.8 kg (162 lb 11.2 oz)   03/27/25 72.8 kg (160 lb 7.9 oz)   03/11/25 72.3 kg (159 lb 6.3 oz)     BP Readings from Last 3 Encounters:   04/16/25 115/60   03/27/25 104/66   03/11/25 126/78     Estimated body mass index is 29.76 kg/m² as calculated from the following:    Height as of this encounter: 5' 2" (1.575 m).    Weight as of this encounter: 73.8 kg (162 lb 11.2 oz).     Physical Exam  Vitals reviewed.   Constitutional:       General: She is not in acute distress.     Appearance: Normal appearance. She is well-developed. She is not ill-appearing.   HENT:      Head: Normocephalic and atraumatic.      Right Ear: External ear normal.      Left Ear: External ear normal.      Nose: Nose normal.   Eyes:      Extraocular Movements: Extraocular movements intact.      Conjunctiva/sclera: Conjunctivae normal.      Pupils: Pupils are equal, round, and reactive to light.   Neck:      Thyroid: No thyroid mass.   Cardiovascular:      Rate and " Rhythm: Normal rate and regular rhythm.      Pulses: Normal pulses.      Heart sounds: Normal heart sounds, S1 normal and S2 normal. No murmur heard.     No gallop.   Pulmonary:      Effort: Pulmonary effort is normal. No respiratory distress.      Breath sounds: Normal breath sounds and air entry. No stridor. No wheezing, rhonchi or rales.   Abdominal:      General: There is no distension.      Palpations: Abdomen is soft. There is no mass.      Tenderness: There is no abdominal tenderness.   Musculoskeletal:         General: No swelling or deformity. Normal range of motion.      Cervical back: Normal range of motion and neck supple. No edema or erythema.   Skin:     General: Skin is warm and dry.      Findings: No lesion or rash.   Neurological:      Mental Status: She is alert and oriented to person, place, and time. Mental status is at baseline.   Psychiatric:         Mood and Affect: Mood normal.         Behavior: Behavior normal. Behavior is cooperative.         Judgment: Judgment normal.              LABS:   274}  I have reviewed old labs below:  Lab Results   Component Value Date    WBC 4.03 03/14/2025    HGB 12.4 03/14/2025    HCT 37.8 03/14/2025    MCV 94 03/14/2025     03/14/2025     03/04/2024    K 4.5 03/04/2024     03/04/2024    CALCIUM 9.5 03/04/2024    CO2 24 03/04/2024    GLU 89 03/04/2024    BUN 17 03/04/2024    CREATININE 0.8 03/04/2024    ANIONGAP 9 03/04/2024    PROT 7.1 03/04/2024    ALBUMIN 4.0 03/04/2024    BILITOT 0.2 03/04/2024    ALKPHOS 48 (L) 03/04/2024    ALT 22 03/04/2024    AST 21 03/04/2024    CHOL 127 03/04/2024    TRIG 46 03/04/2024    HDL 56 03/04/2024    LDLCALC 61.8 (L) 03/04/2024    TSH 0.741 03/14/2025    HGBA1C 5.3 03/04/2024       ASSESSMENT AND PLAN:  274}  1. Encounter for annual general medical examination without abnormal findings in adult  -     Hemoglobin A1C; Future; Expected date: 04/16/2025  -     Comprehensive Metabolic Panel; Future; Expected  date: 04/16/2025  -     IRON AND TIBC; Future; Expected date: 04/16/2025  -     Transferrin; Future; Expected date: 04/16/2025  -     LIPID PANEL; Future; Expected date: 04/16/2025    2. Low ferritin  -     IRON AND TIBC; Future; Expected date: 04/16/2025  -     Transferrin; Future; Expected date: 04/16/2025    3. Fibroids  Comments:  Continue with contraceptive therapy, planned for an ablation procedure.    4. Localized swelling of lower extremity  Comments:  Improved with use of HCTZ as needed.         Assessment & Plan    IMPRESSION:   Reviewed upcoming fibroid ablation procedure scheduled for the 28th, anticipating significant relief from heavy bleeding symptoms and associated symptoms.   Ferritin levels low but not significant enough to cause anemia, likely due to heavy menstrual bleeding.   Considered hormone therapy for fibroids as potential contributor to sugar cravings and increased appetite.   Mild ankle swelling non-concerning and likely due to gravity and fluid retention.    UTERINE FIBROID AND HEAVY MENSTRUAL BLEEDING:   Identified a fibroid causing heavy bleeding in the patient.   Evaluated that the bleeding is not well controlled by contraceptives.   Scheduled the patient for an ablation procedure on the 28th to shrink the fibroid.   Advised the patient about the upcoming procedure for the fibroid.   Noted the patient's experience of heavy bleeding due to fibroid.   Evaluated that the bleeding is not well controlled by contraceptives.   Acknowledged the severity of the bleeding and its potential consequences.   Scheduled an ablation procedure to address the heavy bleeding.   Noted the patient's use of hormonal contraceptives for fibroid treatment.   Evaluated that the contraceptives are not effectively controlling the bleeding.   Acknowledged the use of hormonal contraceptives for fibroid treatment.   Scheduled an ablation procedure as the contraceptives were not sufficiently effective.    IRON  DEFICIENCY:   Continued prenatal vitamin for iron supplementation.   Ordered iron level lab.   Noted the patient's low ferritin level, indicating iron deficiency.   Evaluated that the low ferritin level has not caused anemia yet.   Continued prenatal vitamin for folic acid supplementation.    LOCALIZED EDEMA (ANKLE SWELLING):   Confirmed the presence of ankle swelling in the patient.   Assessed that the swelling is not alarming.   Prescribed 30 tablets of medication for swelling, with no refills.   Recommend non-pharmacological treatments including leg elevation and compression socks.   Instructed the patient to prop legs up and use compression socks during the day for leg swelling.    FOLLOW-UP:   Ordered labs: sodium, potassium, renal function, cholesterol.         Orders Placed This Encounter   Procedures    Hemoglobin A1C    Comprehensive Metabolic Panel    IRON AND TIBC    Transferrin    LIPID PANEL         This note was generated with the assistance of ambient listening technology. Verbal consent was obtained by the patient and accompanying visitor(s) for the recording of patient appointment to facilitate this note. I attest to having reviewed and edited the generated note for accuracy, though some syntax or spelling errors may persist. Please contact the author of this note for any clarification.               [1]   Social History  Socioeconomic History    Marital status:    Tobacco Use    Smoking status: Never    Smokeless tobacco: Never   Substance and Sexual Activity    Alcohol use: Not Currently    Drug use: Never    Sexual activity: Yes     Partners: Male     Birth control/protection: None     Social Drivers of Health     Financial Resource Strain: Low Risk  (4/13/2024)    Overall Financial Resource Strain (CARDIA)     Difficulty of Paying Living Expenses: Not hard at all   Food Insecurity: No Food Insecurity (4/13/2024)    Hunger Vital Sign     Worried About Running Out of Food in the Last Year:  Never true     Ran Out of Food in the Last Year: Never true   Transportation Needs: No Transportation Needs (4/13/2024)    PRAPARE - Transportation     Lack of Transportation (Medical): No     Lack of Transportation (Non-Medical): No   Physical Activity: Sufficiently Active (4/13/2024)    Exercise Vital Sign     Days of Exercise per Week: 5 days     Minutes of Exercise per Session: 70 min   Stress: No Stress Concern Present (4/13/2024)    [employer] - Occupational Stress Questionnaire     Feeling of Stress : Not at all   Housing Stability: Unknown (7/20/2023)    Housing Stability Vital Sign     Unable to Pay for Housing in the Last Year: No     Unstable Housing in the Last Year: No

## 2025-04-22 ENCOUNTER — PATIENT MESSAGE (OUTPATIENT)
Dept: INTERVENTIONAL RADIOLOGY/VASCULAR | Facility: HOSPITAL | Age: 40
End: 2025-04-22
Payer: COMMERCIAL

## 2025-04-28 ENCOUNTER — ANESTHESIA EVENT (OUTPATIENT)
Dept: INTERVENTIONAL RADIOLOGY/VASCULAR | Facility: HOSPITAL | Age: 40
End: 2025-04-28
Payer: COMMERCIAL

## 2025-04-28 ENCOUNTER — HOSPITAL ENCOUNTER (OUTPATIENT)
Facility: HOSPITAL | Age: 40
Discharge: HOME | End: 2025-04-29
Attending: STUDENT IN AN ORGANIZED HEALTH CARE EDUCATION/TRAINING PROGRAM | Admitting: STUDENT IN AN ORGANIZED HEALTH CARE EDUCATION/TRAINING PROGRAM
Payer: COMMERCIAL

## 2025-04-28 ENCOUNTER — HOSPITAL ENCOUNTER (OUTPATIENT)
Dept: INTERVENTIONAL RADIOLOGY/VASCULAR | Facility: HOSPITAL | Age: 40
Discharge: HOME | End: 2025-04-28
Attending: FAMILY MEDICINE
Payer: COMMERCIAL

## 2025-04-28 ENCOUNTER — RESULTS FOLLOW-UP (OUTPATIENT)
Dept: FAMILY MEDICINE | Facility: CLINIC | Age: 40
End: 2025-04-28

## 2025-04-28 DIAGNOSIS — R07.9 CHEST PAIN: ICD-10-CM

## 2025-04-28 DIAGNOSIS — Z86.018 HISTORY OF UTERINE FIBROID: ICD-10-CM

## 2025-04-28 DIAGNOSIS — Z98.890 STATUS POST EMBOLIZATION OF UTERINE ARTERY: Primary | ICD-10-CM

## 2025-04-28 DIAGNOSIS — D25.9 UTERINE FIBROID: ICD-10-CM

## 2025-04-28 DIAGNOSIS — D21.9 FIBROID: ICD-10-CM

## 2025-04-28 DIAGNOSIS — R00.1 BRADYCARDIA: ICD-10-CM

## 2025-04-28 PROBLEM — D64.9 ANEMIA: Status: ACTIVE | Noted: 2025-04-28

## 2025-04-28 LAB
ABSOLUTE EOSINOPHIL (OHS): 0.02 K/UL
ABSOLUTE MONOCYTE (OHS): 0.16 K/UL (ref 0.3–1)
ABSOLUTE NEUTROPHIL COUNT (OHS): 3.99 K/UL (ref 1.8–7.7)
ANION GAP (OHS): 6 MMOL/L (ref 8–16)
B-HCG UR QL: NEGATIVE
BASOPHILS # BLD AUTO: 0.02 K/UL
BASOPHILS NFR BLD AUTO: 0.4 %
BUN SERPL-MCNC: 13 MG/DL (ref 6–20)
CALCIUM SERPL-MCNC: 8.1 MG/DL (ref 8.7–10.5)
CHLORIDE SERPL-SCNC: 106 MMOL/L (ref 95–110)
CO2 SERPL-SCNC: 25 MMOL/L (ref 23–29)
CREAT SERPL-MCNC: 0.7 MG/DL (ref 0.5–1.4)
CTP QC/QA: YES
ERYTHROCYTE [DISTWIDTH] IN BLOOD BY AUTOMATED COUNT: 12.4 % (ref 11.5–14.5)
GFR SERPLBLD CREATININE-BSD FMLA CKD-EPI: >60 ML/MIN/1.73/M2
GLUCOSE SERPL-MCNC: 108 MG/DL (ref 70–110)
HCT VFR BLD AUTO: 36.3 % (ref 37–48.5)
HGB BLD-MCNC: 11.8 GM/DL (ref 12–16)
IMM GRANULOCYTES # BLD AUTO: 0.03 K/UL (ref 0–0.04)
IMM GRANULOCYTES NFR BLD AUTO: 0.6 % (ref 0–0.5)
LYMPHOCYTES # BLD AUTO: 0.99 K/UL (ref 1–4.8)
MCH RBC QN AUTO: 29.6 PG (ref 27–31)
MCHC RBC AUTO-ENTMCNC: 32.5 G/DL (ref 32–36)
MCV RBC AUTO: 91 FL (ref 82–98)
NUCLEATED RBC (/100WBC) (OHS): 0 /100 WBC
OHS QRS DURATION: 102 MS
OHS QTC CALCULATION: 453 MS
PLATELET # BLD AUTO: 185 K/UL (ref 150–450)
PMV BLD AUTO: 10.3 FL (ref 9.2–12.9)
POTASSIUM SERPL-SCNC: 4.1 MMOL/L (ref 3.5–5.1)
RBC # BLD AUTO: 3.98 M/UL (ref 4–5.4)
RELATIVE EOSINOPHIL (OHS): 0.4 %
RELATIVE LYMPHOCYTE (OHS): 19 % (ref 18–48)
RELATIVE MONOCYTE (OHS): 3.1 % (ref 4–15)
RELATIVE NEUTROPHIL (OHS): 76.5 % (ref 38–73)
SODIUM SERPL-SCNC: 137 MMOL/L (ref 136–145)
WBC # BLD AUTO: 5.21 K/UL (ref 3.9–12.7)

## 2025-04-28 PROCEDURE — D9220A PRA ANESTHESIA: Mod: ANES,,, | Performed by: ANESTHESIOLOGY

## 2025-04-28 PROCEDURE — 27000221 HC OXYGEN, UP TO 24 HOURS

## 2025-04-28 PROCEDURE — G0269 OCCLUSIVE DEVICE IN VEIN ART: HCPCS | Performed by: STUDENT IN AN ORGANIZED HEALTH CARE EDUCATION/TRAINING PROGRAM

## 2025-04-28 PROCEDURE — 36247 INS CATH ABD/L-EXT ART 3RD: CPT | Mod: 50,,, | Performed by: STUDENT IN AN ORGANIZED HEALTH CARE EDUCATION/TRAINING PROGRAM

## 2025-04-28 PROCEDURE — 94761 N-INVAS EAR/PLS OXIMETRY MLT: CPT

## 2025-04-28 PROCEDURE — G0379 DIRECT REFER HOSPITAL OBSERV: HCPCS

## 2025-04-28 PROCEDURE — 25000003 PHARM REV CODE 250: Performed by: STUDENT IN AN ORGANIZED HEALTH CARE EDUCATION/TRAINING PROGRAM

## 2025-04-28 PROCEDURE — 63600175 PHARM REV CODE 636 W HCPCS: Performed by: NURSE ANESTHETIST, CERTIFIED REGISTERED

## 2025-04-28 PROCEDURE — 25000003 PHARM REV CODE 250: Performed by: NURSE ANESTHETIST, CERTIFIED REGISTERED

## 2025-04-28 PROCEDURE — 27800903 OPTIME MED/SURG SUP & DEVICES OTHER IMPLANTS

## 2025-04-28 PROCEDURE — 85025 COMPLETE CBC W/AUTO DIFF WBC: CPT | Performed by: STUDENT IN AN ORGANIZED HEALTH CARE EDUCATION/TRAINING PROGRAM

## 2025-04-28 PROCEDURE — 37000008 HC ANESTHESIA 1ST 15 MINUTES

## 2025-04-28 PROCEDURE — 93010 ELECTROCARDIOGRAM REPORT: CPT | Mod: ,,, | Performed by: INTERNAL MEDICINE

## 2025-04-28 PROCEDURE — 80048 BASIC METABOLIC PNL TOTAL CA: CPT | Performed by: STUDENT IN AN ORGANIZED HEALTH CARE EDUCATION/TRAINING PROGRAM

## 2025-04-28 PROCEDURE — 25000003 PHARM REV CODE 250: Performed by: FAMILY MEDICINE

## 2025-04-28 PROCEDURE — 25500020 PHARM REV CODE 255: Performed by: NURSE ANESTHETIST, CERTIFIED REGISTERED

## 2025-04-28 PROCEDURE — 37243 VASC EMBOLIZE/OCCLUDE ORGAN: CPT | Performed by: STUDENT IN AN ORGANIZED HEALTH CARE EDUCATION/TRAINING PROGRAM

## 2025-04-28 PROCEDURE — 63600175 PHARM REV CODE 636 W HCPCS

## 2025-04-28 PROCEDURE — C1760 CLOSURE DEV, VASC: HCPCS

## 2025-04-28 PROCEDURE — G0378 HOSPITAL OBSERVATION PER HR: HCPCS

## 2025-04-28 PROCEDURE — 37000009 HC ANESTHESIA EA ADD 15 MINS

## 2025-04-28 PROCEDURE — C1887 CATHETER, GUIDING: HCPCS

## 2025-04-28 PROCEDURE — 99900035 HC TECH TIME PER 15 MIN (STAT)

## 2025-04-28 PROCEDURE — C1894 INTRO/SHEATH, NON-LASER: HCPCS

## 2025-04-28 PROCEDURE — 63600175 PHARM REV CODE 636 W HCPCS: Performed by: STUDENT IN AN ORGANIZED HEALTH CARE EDUCATION/TRAINING PROGRAM

## 2025-04-28 PROCEDURE — 36247 INS CATH ABD/L-EXT ART 3RD: CPT | Mod: 50 | Performed by: STUDENT IN AN ORGANIZED HEALTH CARE EDUCATION/TRAINING PROGRAM

## 2025-04-28 PROCEDURE — D9220A PRA ANESTHESIA: Mod: CRNA,,, | Performed by: NURSE ANESTHETIST, CERTIFIED REGISTERED

## 2025-04-28 PROCEDURE — 81025 URINE PREGNANCY TEST: CPT | Performed by: FAMILY MEDICINE

## 2025-04-28 PROCEDURE — C1982 CATH, PRESSURE,VALVE-OCCLU: HCPCS

## 2025-04-28 PROCEDURE — C1769 GUIDE WIRE: HCPCS

## 2025-04-28 PROCEDURE — 93005 ELECTROCARDIOGRAM TRACING: CPT

## 2025-04-28 RX ORDER — PROCHLORPERAZINE EDISYLATE 5 MG/ML
5 INJECTION INTRAMUSCULAR; INTRAVENOUS EVERY 30 MIN PRN
Status: DISCONTINUED | OUTPATIENT
Start: 2025-04-28 | End: 2025-04-29 | Stop reason: HOSPADM

## 2025-04-28 RX ORDER — ONDANSETRON HYDROCHLORIDE 2 MG/ML
4 INJECTION, SOLUTION INTRAVENOUS ONCE AS NEEDED
Status: DISCONTINUED | OUTPATIENT
Start: 2025-04-28 | End: 2025-04-29 | Stop reason: HOSPADM

## 2025-04-28 RX ORDER — MORPHINE SULFATE 1 MG/ML
INJECTION INTRAVENOUS CONTINUOUS
Refills: 0 | Status: CANCELLED | OUTPATIENT
Start: 2025-04-28

## 2025-04-28 RX ORDER — DOCUSATE SODIUM 100 MG/1
100 CAPSULE, LIQUID FILLED ORAL 2 TIMES DAILY
Qty: 14 CAPSULE | Refills: 0 | Status: SHIPPED | OUTPATIENT
Start: 2025-04-28 | End: 2025-05-06

## 2025-04-28 RX ORDER — DEXMEDETOMIDINE HYDROCHLORIDE 100 UG/ML
INJECTION, SOLUTION INTRAVENOUS
Status: DISCONTINUED | OUTPATIENT
Start: 2025-04-28 | End: 2025-04-28

## 2025-04-28 RX ORDER — OXYCODONE HYDROCHLORIDE 5 MG/1
5 TABLET ORAL EVERY 6 HOURS PRN
Qty: 20 TABLET | Refills: 0 | Status: SHIPPED | OUTPATIENT
Start: 2025-04-28 | End: 2025-05-04

## 2025-04-28 RX ORDER — FENTANYL CITRATE 50 UG/ML
25 INJECTION, SOLUTION INTRAMUSCULAR; INTRAVENOUS EVERY 5 MIN PRN
Refills: 0 | OUTPATIENT
Start: 2025-04-28

## 2025-04-28 RX ORDER — GLUCAGON 1 MG
1 KIT INJECTION
Status: DISCONTINUED | OUTPATIENT
Start: 2025-04-28 | End: 2025-04-29 | Stop reason: HOSPADM

## 2025-04-28 RX ORDER — ONDANSETRON 4 MG/1
4 TABLET, FILM COATED ORAL EVERY 8 HOURS PRN
Qty: 15 TABLET | Refills: 0 | Status: ON HOLD | OUTPATIENT
Start: 2025-04-28 | End: 2025-04-29 | Stop reason: HOSPADM

## 2025-04-28 RX ORDER — IBUPROFEN 200 MG
16 TABLET ORAL
Status: DISCONTINUED | OUTPATIENT
Start: 2025-04-28 | End: 2025-04-29 | Stop reason: HOSPADM

## 2025-04-28 RX ORDER — CIPROFLOXACIN 500 MG/1
500 TABLET ORAL EVERY 12 HOURS
Qty: 10 TABLET | Refills: 0 | Status: SHIPPED | OUTPATIENT
Start: 2025-04-28 | End: 2025-05-04

## 2025-04-28 RX ORDER — PROPOFOL 10 MG/ML
VIAL (ML) INTRAVENOUS
Status: DISCONTINUED | OUTPATIENT
Start: 2025-04-28 | End: 2025-04-28

## 2025-04-28 RX ORDER — LIDOCAINE HYDROCHLORIDE 20 MG/ML
INJECTION, SOLUTION EPIDURAL; INFILTRATION; INTRACAUDAL; PERINEURAL
Status: DISCONTINUED | OUTPATIENT
Start: 2025-04-28 | End: 2025-04-28

## 2025-04-28 RX ORDER — ONDANSETRON HYDROCHLORIDE 2 MG/ML
4 INJECTION, SOLUTION INTRAVENOUS ONCE AS NEEDED
Status: CANCELLED | OUTPATIENT
Start: 2025-04-28 | End: 2036-09-24

## 2025-04-28 RX ORDER — NALOXONE HCL 0.4 MG/ML
0.02 VIAL (ML) INJECTION
Status: CANCELLED | OUTPATIENT
Start: 2025-04-28

## 2025-04-28 RX ORDER — ONDANSETRON HYDROCHLORIDE 2 MG/ML
INJECTION, SOLUTION INTRAVENOUS
Status: DISCONTINUED | OUTPATIENT
Start: 2025-04-28 | End: 2025-04-28

## 2025-04-28 RX ORDER — FENTANYL CITRATE 50 UG/ML
INJECTION, SOLUTION INTRAMUSCULAR; INTRAVENOUS
Status: DISCONTINUED | OUTPATIENT
Start: 2025-04-28 | End: 2025-04-28

## 2025-04-28 RX ORDER — POLYETHYLENE GLYCOL 3350 17 G/17G
17 POWDER, FOR SOLUTION ORAL DAILY
Status: CANCELLED | OUTPATIENT
Start: 2025-04-28

## 2025-04-28 RX ORDER — DEXAMETHASONE SODIUM PHOSPHATE 4 MG/ML
INJECTION, SOLUTION INTRA-ARTICULAR; INTRALESIONAL; INTRAMUSCULAR; INTRAVENOUS; SOFT TISSUE
Status: DISCONTINUED | OUTPATIENT
Start: 2025-04-28 | End: 2025-04-28

## 2025-04-28 RX ORDER — KETOROLAC TROMETHAMINE 30 MG/ML
INJECTION, SOLUTION INTRAMUSCULAR; INTRAVENOUS
Status: DISCONTINUED | OUTPATIENT
Start: 2025-04-28 | End: 2025-04-28

## 2025-04-28 RX ORDER — HALOPERIDOL LACTATE 5 MG/ML
INJECTION, SOLUTION INTRAMUSCULAR
Status: DISCONTINUED
Start: 2025-04-28 | End: 2025-04-28 | Stop reason: HOSPADM

## 2025-04-28 RX ORDER — IBUPROFEN 200 MG
24 TABLET ORAL
Status: DISCONTINUED | OUTPATIENT
Start: 2025-04-28 | End: 2025-04-29 | Stop reason: HOSPADM

## 2025-04-28 RX ORDER — SODIUM CHLORIDE 9 MG/ML
INJECTION, SOLUTION INTRAVENOUS CONTINUOUS
Status: DISCONTINUED | OUTPATIENT
Start: 2025-04-28 | End: 2025-04-29 | Stop reason: HOSPADM

## 2025-04-28 RX ORDER — MIDAZOLAM HYDROCHLORIDE 1 MG/ML
INJECTION INTRAMUSCULAR; INTRAVENOUS
Status: DISCONTINUED | OUTPATIENT
Start: 2025-04-28 | End: 2025-04-28

## 2025-04-28 RX ORDER — IBUPROFEN 800 MG/1
800 TABLET ORAL 3 TIMES DAILY
Qty: 15 TABLET | Refills: 0 | Status: SHIPPED | OUTPATIENT
Start: 2025-04-28 | End: 2025-05-04

## 2025-04-28 RX ORDER — POLYETHYLENE GLYCOL 3350 17 G/17G
17 POWDER, FOR SOLUTION ORAL DAILY
Status: DISCONTINUED | OUTPATIENT
Start: 2025-04-28 | End: 2025-04-29 | Stop reason: HOSPADM

## 2025-04-28 RX ORDER — NALOXONE HCL 0.4 MG/ML
0.02 VIAL (ML) INJECTION
Status: DISCONTINUED | OUTPATIENT
Start: 2025-04-28 | End: 2025-04-29 | Stop reason: HOSPADM

## 2025-04-28 RX ORDER — PROCHLORPERAZINE EDISYLATE 5 MG/ML
5 INJECTION INTRAMUSCULAR; INTRAVENOUS EVERY 30 MIN PRN
OUTPATIENT
Start: 2025-04-28

## 2025-04-28 RX ORDER — GLUCAGON 1 MG
1 KIT INJECTION
OUTPATIENT
Start: 2025-04-28

## 2025-04-28 RX ORDER — HALOPERIDOL LACTATE 5 MG/ML
0.5 INJECTION, SOLUTION INTRAMUSCULAR EVERY 10 MIN PRN
Status: DISCONTINUED | OUTPATIENT
Start: 2025-04-28 | End: 2025-04-29 | Stop reason: HOSPADM

## 2025-04-28 RX ORDER — MORPHINE SULFATE 1 MG/ML
INJECTION INTRAVENOUS CONTINUOUS
Status: DISCONTINUED | OUTPATIENT
Start: 2025-04-28 | End: 2025-04-28

## 2025-04-28 RX ORDER — LIDOCAINE HYDROCHLORIDE 10 MG/ML
1 INJECTION, SOLUTION EPIDURAL; INFILTRATION; INTRACAUDAL; PERINEURAL ONCE
Status: DISCONTINUED | OUTPATIENT
Start: 2025-04-28 | End: 2025-04-29 | Stop reason: HOSPADM

## 2025-04-28 RX ORDER — ROCURONIUM BROMIDE 10 MG/ML
INJECTION, SOLUTION INTRAVENOUS
Status: DISCONTINUED | OUTPATIENT
Start: 2025-04-28 | End: 2025-04-28

## 2025-04-28 RX ORDER — ONDANSETRON HYDROCHLORIDE 2 MG/ML
4 INJECTION, SOLUTION INTRAVENOUS DAILY PRN
OUTPATIENT
Start: 2025-04-28

## 2025-04-28 RX ORDER — MORPHINE SULFATE 1 MG/ML
INJECTION INTRAVENOUS CONTINUOUS
Refills: 0 | Status: DISCONTINUED | OUTPATIENT
Start: 2025-04-28 | End: 2025-04-29 | Stop reason: HOSPADM

## 2025-04-28 RX ORDER — SODIUM CHLORIDE 0.9 % (FLUSH) 0.9 %
10 SYRINGE (ML) INJECTION EVERY 12 HOURS PRN
Status: DISCONTINUED | OUTPATIENT
Start: 2025-04-28 | End: 2025-04-29 | Stop reason: HOSPADM

## 2025-04-28 RX ORDER — NALOXONE HCL 0.4 MG/ML
0.02 VIAL (ML) INJECTION
Status: DISCONTINUED | OUTPATIENT
Start: 2025-04-28 | End: 2025-04-28

## 2025-04-28 RX ADMIN — FENTANYL CITRATE 100 MCG: 50 INJECTION INTRAMUSCULAR; INTRAVENOUS at 07:04

## 2025-04-28 RX ADMIN — POLYETHYLENE GLYCOL 3350 17 G: 17 POWDER, FOR SOLUTION ORAL at 01:04

## 2025-04-28 RX ADMIN — PROPOFOL 200 MG: 10 INJECTION, EMULSION INTRAVENOUS at 07:04

## 2025-04-28 RX ADMIN — IOHEXOL 80 ML: 300 INJECTION, SOLUTION INTRAVENOUS at 09:04

## 2025-04-28 RX ADMIN — Medication: at 09:04

## 2025-04-28 RX ADMIN — SUGAMMADEX 200 MG: 100 INJECTION, SOLUTION INTRAVENOUS at 09:04

## 2025-04-28 RX ADMIN — ONDANSETRON 4 MG: 2 INJECTION INTRAMUSCULAR; INTRAVENOUS at 09:04

## 2025-04-28 RX ADMIN — LIDOCAINE HYDROCHLORIDE 100 MG: 20 INJECTION, SOLUTION EPIDURAL; INFILTRATION; INTRACAUDAL at 07:04

## 2025-04-28 RX ADMIN — PROCHLORPERAZINE EDISYLATE 5 MG: 5 INJECTION INTRAMUSCULAR; INTRAVENOUS at 12:04

## 2025-04-28 RX ADMIN — DEXMEDETOMIDINE 16 MCG: 200 INJECTION, SOLUTION INTRAVENOUS at 09:04

## 2025-04-28 RX ADMIN — ROCURONIUM BROMIDE 20 MG: 10 INJECTION INTRAVENOUS at 08:04

## 2025-04-28 RX ADMIN — HALOPERIDOL LACTATE 0.5 MG: 5 INJECTION, SOLUTION INTRAMUSCULAR at 10:04

## 2025-04-28 RX ADMIN — MIDAZOLAM 2 MG: 1 INJECTION INTRAMUSCULAR; INTRAVENOUS at 07:04

## 2025-04-28 RX ADMIN — ROCURONIUM BROMIDE 50 MG: 10 INJECTION INTRAVENOUS at 07:04

## 2025-04-28 RX ADMIN — DEXMEDETOMIDINE 8 MCG: 200 INJECTION, SOLUTION INTRAVENOUS at 09:04

## 2025-04-28 RX ADMIN — SODIUM CHLORIDE: 0.9 INJECTION, SOLUTION INTRAVENOUS at 07:04

## 2025-04-28 RX ADMIN — DEXAMETHASONE SODIUM PHOSPHATE 4 MG: 4 INJECTION, SOLUTION INTRAMUSCULAR; INTRAVENOUS at 08:04

## 2025-04-28 RX ADMIN — SODIUM CHLORIDE: 9 INJECTION, SOLUTION INTRAVENOUS at 09:04

## 2025-04-28 RX ADMIN — SODIUM CHLORIDE, SODIUM GLUCONATE, SODIUM ACETATE, POTASSIUM CHLORIDE, MAGNESIUM CHLORIDE, SODIUM PHOSPHATE, DIBASIC, AND POTASSIUM PHOSPHATE: .53; .5; .37; .037; .03; .012; .00082 INJECTION, SOLUTION INTRAVENOUS at 09:04

## 2025-04-28 RX ADMIN — KETOROLAC TROMETHAMINE 15 MG: 30 INJECTION, SOLUTION INTRAMUSCULAR; INTRAVENOUS at 09:04

## 2025-04-28 NOTE — ANESTHESIA PREPROCEDURE EVALUATION
04/28/2025  Carmina Hernandez is a 39 y.o., female.      Pre-op Assessment    I have reviewed the Patient Summary Reports.     I have reviewed the Nursing Notes. I have reviewed the NPO Status.   I have reviewed the Medications.     Review of Systems  Anesthesia Hx:  No problems with previous Anesthesia   History of prior surgery of interest to airway management or planning:            Denies Personal Hx of Anesthesia complications.                    Social:  No Alcohol Use, Non-Smoker       Hematology/Oncology:  Hematology Normal   Oncology Normal                                   EENT/Dental:  EENT/Dental Normal           Cardiovascular:  Exercise tolerance: good          Denies Angina.       Denies VALLEJO.                              Pulmonary:  Pulmonary Normal      Denies Shortness of breath.                  Renal/:  Renal/ Normal                 Hepatic/GI:  Hepatic/GI Normal     Denies GERD.                Musculoskeletal:  Musculoskeletal Normal                Neurological:  Neurology Normal                                      Endocrine:  Endocrine Normal            Dermatological:  Skin Normal    Psych:  Psychiatric Normal                  Physical Exam  General: Well nourished, Cooperative, Alert and Oriented    Airway:  Mallampati: II   Mouth Opening: Normal  TM Distance: Normal  Tongue: Normal  Neck ROM: Normal ROM    Dental:  Intact    Chest/Lungs:  Normal Respiratory Rate    Heart:  Rate: Normal    Anesthesia Plan  Type of Anesthesia, risks & benefits discussed:    Anesthesia Type: Gen ETT  Intra-op Monitoring Plan: Standard ASA Monitors  Post Op Pain Control Plan: multimodal analgesia and IV/PO Opioids PRN  Induction:  IV  Airway Plan: Video, Post-Induction  Informed Consent: Informed consent signed with the Patient and all parties understand the risks and agree with anesthesia plan.  All  questions answered.   ASA Score: 1  Day of Surgery Review of History & Physical: H&P Update referred to the surgeon/provider.    Ready For Surgery From Anesthesia Perspective.   .

## 2025-04-28 NOTE — PROGRESS NOTES
..Nursing Transfer Note      Reason patient is being transferred: procedure care complete     Transfer To: 1131    Transfer via stretcher    Transfer with cardiac monitoring, ETO2 monitoring     Transported by LYLE and babar CRYSTAL     Medicines sent: morphine PCA syringe 30 mg/30 mL (1 mg/mL) NSIntravenous  :  Continuous     0.9% NaCl [id number] mL/hr  :  Intravenous  :  Continuous       Any special needs or follow-up needed: routine    Chart send with patient: Yes    Notified: spouse    Patient reassessed at: 9671 4/28/25 (date, time)

## 2025-04-28 NOTE — H&P
[facility] [address] [place] [address] Medicine  History & Physical    Patient Name: Carmina Hernandez  MRN: [id number]  Patient Class: OP- Observation  Admission Date: 4/28/2025  Attending Physician: Rufina Barillas MD   Primary Care Provider: Clay Lacy DO         Patient information was obtained from patient, past medical records, and ER records.     Subjective:     Principal Problem:Status post embolization of uterine artery    Chief Complaint: No chief complaint on file.       HPI: Carmina Hernandez is a 39 y.o. female w/ PMHx of menorrhagia and uterine fibroid s/p uterine artery embolization by IR AM 4/28. Patient admitted for monitoring and pain control post-procedure. On my evaluation in the PACU, patient endorsing pain and nausea. On PCA for pain. Denies fever or chills. Denies chest pain or difficulty breathing. Denies constipation or diarrhea.       No past medical history on file.    Past Surgical History:   Procedure Laterality Date    KELOID EXCISION  2010    chest       Review of patient's allergies indicates:   Allergen Reactions    Sulfa (sulfonamide antibiotics)        Current Facility-Administered Medications on File Prior to Encounter   Medication    0.9% NaCl infusion    haloperidol lactate (HALDOL) 5 mg/mL injection    haloperidol lactate injection 0.5 mg    [COMPLETED] iohexoL (OMNIPAQUE 300) injection 80 mL    LIDOcaine (PF) 10 mg/ml (1%) injection 10 mg    morphine PCA syringe 30 mg/30 mL (1 mg/mL) NS    naloxone 0.4 mg/mL injection 0.02 mg    ondansetron injection 4 mg    polyethylene glycol packet 17 g    prochlorperazine injection Soln 5 mg    [DISCONTINUED] dexAMETHasone injection    [DISCONTINUED] dexmedeTOMIDine injection    [DISCONTINUED] fentaNYL 50 mcg/mL injection    [DISCONTINUED] isolyte infusion    [DISCONTINUED] ketorolac injection    [DISCONTINUED] LIDOcaine (PF) 20 mg/ml (2%) injection    [DISCONTINUED] midazolam (VERSED) 1 mg/mL injection    [DISCONTINUED] morphine PCA  syringe 30 mg/30 mL (1 mg/mL) NS    [DISCONTINUED] naloxone 0.4 mg/mL injection 0.02 mg    [DISCONTINUED] ondansetron injection    [DISCONTINUED] propofol (DIPRIVAN) 10 mg/mL infusion    [DISCONTINUED] rocuronium injection    [DISCONTINUED] sodium chloride 0.9% infusion    [DISCONTINUED] sugammadex (BRIDION) 100 mg/mL injection     Current Outpatient Medications on File Prior to Encounter   Medication Sig    ciprofloxacin HCl (CIPRO) 500 MG tablet Take 1 tablet (500 mg total) by mouth every 12 (twelve) hours. for 5 days    docusate sodium (COLACE) 100 MG capsule Take 1 capsule (100 mg total) by mouth 2 (two) times daily. for 7 days    hydroCHLOROthiazide (HYDRODIURIL) 12.5 MG Tab Take 1 tablet (12.5 mg total) by mouth daily as needed (leg swelling).    ibuprofen (ADVIL,MOTRIN) 800 MG tablet Take 1 tablet (800 mg total) by mouth 3 (three) times daily. for 5 days    norethindrone (AYGESTIN) 5 mg Tab Take 1 tablet by mouth only on cycle days 5-26 each menstrual cycle. Cycle day 1 is the first day of your period.    ondansetron (ZOFRAN) 4 MG tablet Take 1 tablet (4 mg total) by mouth every 8 (eight) hours as needed for Nausea.    oxyCODONE (ROXICODONE) 5 MG immediate release tablet Take 1 tablet (5 mg total) by mouth every 6 (six) hours as needed for Pain.    prenatal no115/iron/folic acid (PRENATAL 19 ORAL) Take by mouth.    tretinoin (RETIN-A) 0.05 % cream Apply 1 application  topically every evening.     Family History       Problem Relation (Age of Onset)    Breast cancer Maternal Aunt    Dementia Mother    Hypertension Father    No Known Problems Daughter          Tobacco Use    Smoking status: Never    Smokeless tobacco: Never   Substance and Sexual Activity    Alcohol use: Not Currently    Drug use: Never    Sexual activity: Yes     Partners: Male     Birth control/protection: None     Review of Systems  Objective:     Vital Signs (Most Recent):    Vital Signs (24h Range):  Temp:  [97.5 °F (36.4 °C)-98.6 °F (37  °C)] 98.6 °F (37 °C)  Pulse:  [45-75] 53  Resp:  [11-20] 14  SpO2:  [99 %-100 %] 100 %  BP: ()/(52-74) 102/57        There is no height or weight on file to calculate BMI.     Physical Exam  Vitals and nursing note reviewed.   Constitutional:       General: She is sleeping. She is not in acute distress.     Appearance: She is not ill-appearing.   Cardiovascular:      Rate and Rhythm: Regular rhythm. Bradycardia present.      Heart sounds: Normal heart sounds. No murmur heard.  Pulmonary:      Effort: No respiratory distress.      Breath sounds: Normal breath sounds. No wheezing.   Abdominal:      General: There is no distension.      Palpations: Abdomen is soft.      Tenderness: There is abdominal tenderness.   Musculoskeletal:      Right lower leg: No edema.      Left lower leg: No edema.   Neurological:      General: No focal deficit present.   Psychiatric:         Mood and Affect: Mood normal.         Behavior: Behavior normal.                Significant Labs: All pertinent labs within the past 24 hours have been reviewed.  BMP:   Recent Labs   Lab 04/28/25  1116      K 4.1      CO2 25   BUN 13   CREATININE 0.7   CALCIUM 8.1*     CBC:   Recent Labs   Lab 04/28/25  1116   WBC 5.21   HGB 11.8*   HCT 36.3*          Significant Imaging: I have reviewed all pertinent imaging results/findings within the past 24 hours.  Assessment/Plan:     Assessment & Plan  Status post embolization of uterine artery  History of uterine fibroid  Per IR recommendations:   1. Overnight admission for pain management (PCA pump)  2. Ok to discharge in AM when pain is controlled, patient able to void and afebrile  3. Encourage fluid intake  4. Prescriptions to be sent to pharmacy        - Oxycodone 5 mg PRN      -  Ciprofloxacin 500 mg BID for 5 days      - Ibuprofen 800 mg TID PRN      - Zofran 4 mg Q6HPRN for nausea  4. Return to normal activity in 4-7 days    5. Avoid tampons and intercourse for 1 week  6. Expect  possible occasional vaginal discharge for 1- 3 weeks  7. Plan for follow up in 3 weeks, 3 months and 6 months in IR clinic (virtually)     PCA: morphine pump, 0.5mg q6min, max 5mg per hour      Obesity  There is no height or weight on file to calculate BMI. Morbid obesity complicates all aspects of disease management from diagnostic modalities to treatment. Weight loss encouraged and health benefits explained to patient.   Anemia  Anemia is likely due to chronic blood loss. Most recent hemoglobin and hematocrit are listed below.  Recent Labs     04/28/25  1116   HGB 11.8*   HCT 36.3*     Plan  - Monitor serial CBC: Daily  - Transfuse PRBC if patient becomes hemodynamically unstable, symptomatic or H/H drops below 7/21.  - Patient has not received any PRBC transfusions to date  Bradycardia  Sinus cyrus on EKG      VTE Risk Mitigation (From admission, onward)      None               On 04/28/2025, patient should be placed in hospital observation services under my care.             Rufina Barillas MD  Department of Hospital Medicine  [facility]

## 2025-04-28 NOTE — ASSESSMENT & PLAN NOTE
Per IR recommendations:   1. Overnight admission for pain management (PCA pump)  2. Ok to discharge in AM when pain is controlled, patient able to void and afebrile  3. Encourage fluid intake  4. Prescriptions to be sent to pharmacy        - Oxycodone 5 mg PRN      -  Ciprofloxacin 500 mg BID for 5 days      - Ibuprofen 800 mg TID PRN      - Zofran 4 mg Q6HPRN for nausea  4. Return to normal activity in 4-7 days    5. Avoid tampons and intercourse for 1 week  6. Expect possible occasional vaginal discharge for 1- 3 weeks  7. Plan for follow up in 3 weeks, 3 months and 6 months in IR clinic (virtually)     PCA: morphine pump, 0.5mg q6min, max 5mg per hour

## 2025-04-28 NOTE — ANESTHESIA PROCEDURE NOTES
Intubation    Date/Time: 4/28/2025 7:42 AM    Performed by: Mago Rutledge CRNA  Authorized by: Ivette Avalos MD    Intubation:     Induction:  Intravenous    Intubated:  Postinduction    Mask Ventilation:  Easy mask    Attempts:  1    Attempted By:  CRNA    Method of Intubation:  Video laryngoscopy    Blade:  Gonzalez 3    Laryngeal View Grade: Grade I - full view of cords      Difficult Airway Encountered?: No      Complications:  None    Airway Device:  Oral endotracheal tube    Airway Device Size:  7.0    Style/Cuff Inflation:  Cuffed (inflated to minimal occlusive pressure)    Tube secured:  23    Secured at:  The lips    Placement Verified By:  Capnometry    Complicating Factors:  None    Findings Post-Intubation:  BS equal bilateral and atraumatic/condition of teeth unchanged

## 2025-04-28 NOTE — ASSESSMENT & PLAN NOTE
Anemia is likely due to chronic blood loss. Most recent hemoglobin and hematocrit are listed below.  Recent Labs     04/28/25  1116   HGB 11.8*   HCT 36.3*     Plan  - Monitor serial CBC: Daily  - Transfuse PRBC if patient becomes hemodynamically unstable, symptomatic or H/H drops below 7/21.  - Patient has not received any PRBC transfusions to date

## 2025-04-28 NOTE — DISCHARGE INSTRUCTIONS
Prescriptions to be sent to pharmacy        - Oxycodone 5 mg PRN q6h PRN pain      -  Ciprofloxacin 500 mg BID for 5 days      - Ibuprofen 800 mg TID PRN      - Zofran 4 mg Q8HPRN for nausea    Return to normal activity in 4-7 days    Avoid tampons and intercourse for 1 week  Expect possible occasional vaginal discharge for 1- 3 weeks  Plan for follow up in 3 weeks, 3 months and 6 months in IR clinic (virtually)         ANGIOGRAM GROIN RECOVERY INSTRUCTIONS:    You have had a procedure called a Angiogram. This procedure is usually performed by a specially trained doctor called an interventional radiologist. The procedure allows the doctor visualize the arteries in you body, and to either stop blood flow or improve blood flow to different parts of your body. A catheter - a thin, flexible tube - was inserted into one of your blood vessels through a small incision in your groin and guided to a specific artery to deliver the therapy. Please follow the following instructions while recovering:    Monitor the groin site for bleeding. Apply firm pressure to the groin site if you need to cough, during sneezes, and in the event you have to vomit. This reduces the risk of your site bleeding. If bleeding does occurs, apply firm, manual pressure and seek medical assistance immediately!  Do not shower/bathe tonight. Shower tomorrow, at least 24 hours post-procedure. After your shower, you may remove the groin dressing.   Carefully cleanse the groin site with gentle soap and water; do not pick at any scab formation.  Monitor the groin site for signs and symptoms of infection (See below).      Recovering at Home:  After UFE, you can expect:   Pelvic pain or cramping    Nausea or vomiting    Feeling tired    Low-grade fever    Poor appetite    Flu-like symptoms    Light spotting (can last up to 1 month)   These symptoms usually last 5-10 days and may be more severe in some women than others. Often, people feel better 1-2 days after  the procedure and then experience additional episodes of pain and cramping. For this reason, we recommend taking your medications as prescribed for the first 3-4 days.    Follow your doctor's recommendations on when it is safe to drive - at least THREE days after your procedure.  Do not lift anything heavier than a gallon of milk for the next TEN days.  Avoid strenuous activity/exercise for the next TEN days - this includes climbing stairs. Wait at least 14 days prior to weight lifting  Do not submerge in a bathtub, pool, or Jacuzzi until the groin site is fully closed - at least 14 days.  Rest often. You may be more tired than usual.  Take your medications exactly as directed. Do not skip doses. Note - some medications should not be resumed after this procedure to prevent any adverse interaction with the contrast dye, which may be harmful to your kidneys. Be sure to ask your healthcare team about any potential reactions and for guidance on what medications to hold.  Unless directed otherwise, drink six to eight glasses of water a day for the next TWO days to prevent dehydration and to help flush your body of the contrast dye used during your procedure.  Take your temperature and check the groin site for signs of infection - redness, swelling, drainage, or warmth - every day for one week.    When to call your doctor:  Fever greater than 102.5?or fever associated with sweating and chills   Abnormal foul-smelling vaginal discharge   Heavy bright red vaginal bleeding, different from your normal menstrual flow  Pain or burning with urination   Pelvic pain unrelieved by pain medication, or pain which lasts longer than two weeks  Sudden shortness breath or any bleeding, bruising, or a large area of swelling at the groin site.  Signs of an allergic reaction to the contrast dye: rash, nausea, vomiting, or trouble breathing.  Signs of infection at the groin site: increased pain, redness, swelling, warmth, or foul-smelling  drainage.   Constant or increasing pain or numbness in your leg.  Your leg feels cold, looks blue; numbness and/or tingling in the leg, especially near the catheter insertion site.   Rapid, pounding, or irregular heartbeat.  Blood in your urine or black, tarry stools.     Interventional Radiology Clinic   For complications   [phone]. Monday - Friday, 8:00 am - 4:00 pm    [phone] After hours and on holidays. Ask to speak with the interventional radiologist on call.     For Scheduling   [phone] Monday - Friday, 8:00 am - 4:00 pm         Our goal at [facility] is to always give you outstanding care and exceptional service. You may receive a survey from [employer] by mail, text or e-mail in the next 24-48 hours asking about the care you received with us. The survey should only take 5-10 minutes to complete and is very important to us.     Your feedback provides us with a way to recognize our staff who work tirelessly to provide the best care! Also, your responses help us learn how to improve when your experience was below our aspiration of excellence. We are always looking for ways to improve your stay. We WILL use your feedback to continue making improvements to help us provide the highest quality care. We keep your personal information and feedback confidential. We appreciate your time completing this survey and can't wait to hear from you!!!    We look forward to your continued care with us! Thanks so much for choosing [facility] for your healthcare needs!

## 2025-04-28 NOTE — SUBJECTIVE & OBJECTIVE
No past medical history on file.    Past Surgical History:   Procedure Laterality Date    KELOID EXCISION  2010    chest       Review of patient's allergies indicates:   Allergen Reactions    Sulfa (sulfonamide antibiotics)        Current Facility-Administered Medications on File Prior to Encounter   Medication    0.9% NaCl infusion    haloperidol lactate (HALDOL) 5 mg/mL injection    haloperidol lactate injection 0.5 mg    [COMPLETED] iohexoL (OMNIPAQUE 300) injection 80 mL    LIDOcaine (PF) 10 mg/ml (1%) injection 10 mg    morphine PCA syringe 30 mg/30 mL (1 mg/mL) NS    naloxone 0.4 mg/mL injection 0.02 mg    ondansetron injection 4 mg    polyethylene glycol packet 17 g    prochlorperazine injection Soln 5 mg    [DISCONTINUED] dexAMETHasone injection    [DISCONTINUED] dexmedeTOMIDine injection    [DISCONTINUED] fentaNYL 50 mcg/mL injection    [DISCONTINUED] isolyte infusion    [DISCONTINUED] ketorolac injection    [DISCONTINUED] LIDOcaine (PF) 20 mg/ml (2%) injection    [DISCONTINUED] midazolam (VERSED) 1 mg/mL injection    [DISCONTINUED] morphine PCA syringe 30 mg/30 mL (1 mg/mL) NS    [DISCONTINUED] naloxone 0.4 mg/mL injection 0.02 mg    [DISCONTINUED] ondansetron injection    [DISCONTINUED] propofol (DIPRIVAN) 10 mg/mL infusion    [DISCONTINUED] rocuronium injection    [DISCONTINUED] sodium chloride 0.9% infusion    [DISCONTINUED] sugammadex (BRIDION) 100 mg/mL injection     Current Outpatient Medications on File Prior to Encounter   Medication Sig    ciprofloxacin HCl (CIPRO) 500 MG tablet Take 1 tablet (500 mg total) by mouth every 12 (twelve) hours. for 5 days    docusate sodium (COLACE) 100 MG capsule Take 1 capsule (100 mg total) by mouth 2 (two) times daily. for 7 days    hydroCHLOROthiazide (HYDRODIURIL) 12.5 MG Tab Take 1 tablet (12.5 mg total) by mouth daily as needed (leg swelling).    ibuprofen (ADVIL,MOTRIN) 800 MG tablet Take 1 tablet (800 mg total) by mouth 3 (three) times daily. for 5 days     norethindrone (AYGESTIN) 5 mg Tab Take 1 tablet by mouth only on cycle days 5-26 each menstrual cycle. Cycle day 1 is the first day of your period.    ondansetron (ZOFRAN) 4 MG tablet Take 1 tablet (4 mg total) by mouth every 8 (eight) hours as needed for Nausea.    oxyCODONE (ROXICODONE) 5 MG immediate release tablet Take 1 tablet (5 mg total) by mouth every 6 (six) hours as needed for Pain.    prenatal no115/iron/folic acid (PRENATAL 19 ORAL) Take by mouth.    tretinoin (RETIN-A) 0.05 % cream Apply 1 application  topically every evening.     Family History       Problem Relation (Age of Onset)    Breast cancer Maternal Aunt    Dementia Mother    Hypertension Father    No Known Problems Daughter          Tobacco Use    Smoking status: Never    Smokeless tobacco: Never   Substance and Sexual Activity    Alcohol use: Not Currently    Drug use: Never    Sexual activity: Yes     Partners: Male     Birth control/protection: None     Review of Systems  Objective:     Vital Signs (Most Recent):    Vital Signs (24h Range):  Temp:  [97.5 °F (36.4 °C)-98.6 °F (37 °C)] 98.6 °F (37 °C)  Pulse:  [45-75] 53  Resp:  [11-20] 14  SpO2:  [99 %-100 %] 100 %  BP: ()/(52-74) 102/57        There is no height or weight on file to calculate BMI.     Physical Exam  Vitals and nursing note reviewed.   Constitutional:       General: She is sleeping. She is not in acute distress.     Appearance: She is not ill-appearing.   Cardiovascular:      Rate and Rhythm: Regular rhythm. Bradycardia present.      Heart sounds: Normal heart sounds. No murmur heard.  Pulmonary:      Effort: No respiratory distress.      Breath sounds: Normal breath sounds. No wheezing.   Abdominal:      General: There is no distension.      Palpations: Abdomen is soft.      Tenderness: There is abdominal tenderness.   Musculoskeletal:      Right lower leg: No edema.      Left lower leg: No edema.   Neurological:      General: No focal deficit present.   Psychiatric:          Mood and Affect: Mood normal.         Behavior: Behavior normal.                Significant Labs: All pertinent labs within the past 24 hours have been reviewed.  BMP:   Recent Labs   Lab 04/28/25  1116      K 4.1      CO2 25   BUN 13   CREATININE 0.7   CALCIUM 8.1*     CBC:   Recent Labs   Lab 04/28/25  1116   WBC 5.21   HGB 11.8*   HCT 36.3*          Significant Imaging: I have reviewed all pertinent imaging results/findings within the past 24 hours.

## 2025-04-28 NOTE — PROCEDURES
IR Post-Procedure Note    Pre Op Diagnosis: Uterine Fibroids    Post Op Diagnosis: same    Procedure: Uterine fibroid embolization    Procedure performed by: Theresa Guzmán MD    Written Informed Consent Obtained: Yes    Specimen Removed:  No    Estimated Blood Loss: Minimal    Findings:   Anesthesia was used.     RCFA access and pelvic angiography was performed.     A microcatheter was inserted into the uterine arteries bilaterally and Embospheres were injected until pruning on the left and 5 beats stasis on the right.       The patient tolerated the procedure well and there were no complications.  Please see Imaging report for further details.    Plan:  Overnight admission for pain management (PCA pump)  Recommend discontinuing PCA pump at 6am and transitioning to oxycodone 5mg q4h PRN pain. Okay to discharge if pain is controlled, patient able to void and tolerate PO, and is afebrile.   Encourage fluid intake  Prescriptions to be sent to pharmacy        - Oxycodone 5 mg PRN      -  Ciprofloxacin 500 mg BID for 5 days      - Ibuprofen 800 mg TID PRN      - Zofran 4 mg Q6HPRN for nausea  4. Return to normal activity in 4-7 days    5. Avoid tampons and intercourse for 1 week  6. Expect possible occasional vaginal discharge for 1- 3 weeks  7. Plan for follow up in 3 weeks, 3 months and 6 months in IR clinic (virtually)         Theresa Guzmán MD  Interventional Radiology

## 2025-04-28 NOTE — H&P
Inpatient Radiology Pre-procedure Note    History of Present Illness:  Carmina Hernandez is a 39 y.o. female with history of menorrhagia who presents for uterine artery embolization.    Admission H&P reviewed.  History reviewed. No pertinent past medical history.  Past Surgical History:   Procedure Laterality Date    KELOID EXCISION  2010    chest       Review of Systems:   As documented in primary team H&P    Home Meds:   Prior to Admission medications    Medication Sig Start Date End Date Taking? Authorizing Provider   norethindrone (AYGESTIN) 5 mg Tab Take 1 tablet by mouth only on cycle days 5-26 each menstrual cycle. Cycle day 1 is the first day of your period. 3/11/25  Yes Weeks, Kellen GUTIÉRREZ MD   prenatal no115/iron/folic acid (PRENATAL 19 ORAL) Take by mouth.   Yes Provider, Historical   tretinoin (RETIN-A) 0.05 % cream Apply 1 application  topically every evening. 2/12/25  Yes Provider, Historical   hydroCHLOROthiazide (HYDRODIURIL) 12.5 MG Tab Take 1 tablet (12.5 mg total) by mouth daily as needed (leg swelling). 4/16/24 4/25/25  Hawa Burch NP     Scheduled Meds:    LIDOcaine (PF) 10 mg/ml (1%)  1 mL Other Once     Continuous Infusions:    0.9% NaCl   Intravenous Continuous         PRN Meds:  Anticoagulants/Antiplatelets: no anticoagulation    Allergies:   Review of patient's allergies indicates:   Allergen Reactions    Sulfa (sulfonamide antibiotics)      Sedation Hx: have not been any systemic reactions    Labs:  Recent Labs   Lab 04/25/25  0952   INR 1.1       Recent Labs   Lab 04/25/25  0952   WBC 6.48   HGB 13.2   HCT 39.8   MCV 93         Recent Labs   Lab 04/25/25  0952     138   K 4.4  4.2     105   CO2 23  23   BUN 22*  22*   CREATININE 0.8  0.8   CALCIUM 9.0  9.1   ALT 15  16   AST 18  18   ALBUMIN 4.2  4.3   BILITOT 0.3  0.3       Vitals:  Temp: 98.1 °F (36.7 °C) (04/28/25 0619)  Pulse: 65 (04/28/25 0619)  Resp: 16 (04/28/25 0619)  BP: 123/74 (04/28/25 0620)  SpO2:  100 % (04/28/25 0619)     Physical Exam:  ASA: per anesthesia  Mallampati: per anesthesia    General: no acute distress  Mental Status: alert and oriented   HEENT: normocephalic, atraumatic  Chest: unlabored breathing  Abdomen: nondistended  Extremities: moves all extremities    Plan: Proceed with uterine artery embolization.  Sedation plan: per anesthesia  Please make NPO and hold anticoagulation midnight before procedure.      Mackenzie Patel MD  Diagnostic and Interventional Radiology          Exam:  General: Patient well appearing, vital signs within normal limits  HEENT: airway patent with moist mucous membranes  Cardiac: RRR S1/S2 with strong peripheral pulses  Respiratory: lungs clear without respiratory distress  GI: abdomen soft, non tender, non distended  Neuro: R facial droop appreciated, RUE/RLE 4/5 strength, LUE/LLE 5/5  Skin: warm, well perfused  Psych: normal mood and affect

## 2025-04-28 NOTE — HPI
Carmina Hernandez is a 39 y.o. female w/ PMHx of menorrhagia and uterine fibroid s/p uterine artery embolization by IR AM 4/28. Patient admitted for monitoring and pain control post-procedure. On my evaluation in the PACU, patient endorsing pain and nausea. On PCA for pain. Denies fever or chills. Denies chest pain or difficulty breathing. Denies constipation or diarrhea.

## 2025-04-29 VITALS
HEIGHT: 62 IN | SYSTOLIC BLOOD PRESSURE: 116 MMHG | RESPIRATION RATE: 18 BRPM | TEMPERATURE: 98 F | DIASTOLIC BLOOD PRESSURE: 68 MMHG | HEART RATE: 58 BPM | OXYGEN SATURATION: 100 % | RESPIRATION RATE: 18 BRPM | BODY MASS INDEX: 29.44 KG/M2 | TEMPERATURE: 98 F | HEART RATE: 73 BPM | WEIGHT: 160 LBS

## 2025-04-29 LAB
ABSOLUTE EOSINOPHIL (OHS): 0.03 K/UL
ABSOLUTE MONOCYTE (OHS): 0.79 K/UL (ref 0.3–1)
ABSOLUTE NEUTROPHIL COUNT (OHS): 7.69 K/UL (ref 1.8–7.7)
BASOPHILS # BLD AUTO: 0.02 K/UL
BASOPHILS NFR BLD AUTO: 0.2 %
ERYTHROCYTE [DISTWIDTH] IN BLOOD BY AUTOMATED COUNT: 12.7 % (ref 11.5–14.5)
HCT VFR BLD AUTO: 36.4 % (ref 37–48.5)
HGB BLD-MCNC: 12 GM/DL (ref 12–16)
IMM GRANULOCYTES # BLD AUTO: 0.04 K/UL (ref 0–0.04)
IMM GRANULOCYTES NFR BLD AUTO: 0.4 % (ref 0–0.5)
LYMPHOCYTES # BLD AUTO: 1.64 K/UL (ref 1–4.8)
MCH RBC QN AUTO: 30.6 PG (ref 27–31)
MCHC RBC AUTO-ENTMCNC: 33 G/DL (ref 32–36)
MCV RBC AUTO: 93 FL (ref 82–98)
NUCLEATED RBC (/100WBC) (OHS): 0 /100 WBC
PLATELET # BLD AUTO: 205 K/UL (ref 150–450)
PMV BLD AUTO: 10.9 FL (ref 9.2–12.9)
RBC # BLD AUTO: 3.92 M/UL (ref 4–5.4)
RELATIVE EOSINOPHIL (OHS): 0.3 %
RELATIVE LYMPHOCYTE (OHS): 16.1 % (ref 18–48)
RELATIVE MONOCYTE (OHS): 7.7 % (ref 4–15)
RELATIVE NEUTROPHIL (OHS): 75.3 % (ref 38–73)
WBC # BLD AUTO: 10.21 K/UL (ref 3.9–12.7)

## 2025-04-29 PROCEDURE — G0378 HOSPITAL OBSERVATION PER HR: HCPCS

## 2025-04-29 PROCEDURE — 85025 COMPLETE CBC W/AUTO DIFF WBC: CPT | Performed by: STUDENT IN AN ORGANIZED HEALTH CARE EDUCATION/TRAINING PROGRAM

## 2025-04-29 PROCEDURE — 63600175 PHARM REV CODE 636 W HCPCS: Performed by: STUDENT IN AN ORGANIZED HEALTH CARE EDUCATION/TRAINING PROGRAM

## 2025-04-29 PROCEDURE — 25000003 PHARM REV CODE 250: Performed by: STUDENT IN AN ORGANIZED HEALTH CARE EDUCATION/TRAINING PROGRAM

## 2025-04-29 PROCEDURE — 25000003 PHARM REV CODE 250

## 2025-04-29 PROCEDURE — 96374 THER/PROPH/DIAG INJ IV PUSH: CPT

## 2025-04-29 PROCEDURE — 63600175 PHARM REV CODE 636 W HCPCS

## 2025-04-29 PROCEDURE — 36415 COLL VENOUS BLD VENIPUNCTURE: CPT | Performed by: STUDENT IN AN ORGANIZED HEALTH CARE EDUCATION/TRAINING PROGRAM

## 2025-04-29 RX ORDER — OXYCODONE HYDROCHLORIDE 5 MG/1
5 TABLET ORAL EVERY 6 HOURS PRN
Refills: 0 | Status: DISCONTINUED | OUTPATIENT
Start: 2025-04-29 | End: 2025-04-29 | Stop reason: HOSPADM

## 2025-04-29 RX ORDER — PROCHLORPERAZINE EDISYLATE 5 MG/ML
2.5 INJECTION INTRAMUSCULAR; INTRAVENOUS EVERY 6 HOURS PRN
Status: DISCONTINUED | OUTPATIENT
Start: 2025-04-29 | End: 2025-04-29 | Stop reason: HOSPADM

## 2025-04-29 RX ORDER — IBUPROFEN 400 MG/1
800 TABLET ORAL EVERY 8 HOURS
Status: DISCONTINUED | OUTPATIENT
Start: 2025-04-29 | End: 2025-04-29 | Stop reason: HOSPADM

## 2025-04-29 RX ORDER — OXYCODONE HYDROCHLORIDE 10 MG/1
10 TABLET ORAL ONCE
Refills: 0 | Status: COMPLETED | OUTPATIENT
Start: 2025-04-29 | End: 2025-04-29

## 2025-04-29 RX ORDER — CIPROFLOXACIN 500 MG/1
500 TABLET ORAL EVERY 12 HOURS
Status: DISCONTINUED | OUTPATIENT
Start: 2025-04-29 | End: 2025-04-29 | Stop reason: HOSPADM

## 2025-04-29 RX ORDER — ONDANSETRON 4 MG/1
4 TABLET, ORALLY DISINTEGRATING ORAL EVERY 6 HOURS PRN
Qty: 20 TABLET | Refills: 0 | Status: SHIPPED | OUTPATIENT
Start: 2025-04-29 | End: 2025-05-04

## 2025-04-29 RX ORDER — ONDANSETRON HYDROCHLORIDE 2 MG/ML
4 INJECTION, SOLUTION INTRAVENOUS EVERY 8 HOURS PRN
Status: DISCONTINUED | OUTPATIENT
Start: 2025-04-29 | End: 2025-04-29

## 2025-04-29 RX ORDER — ONDANSETRON 4 MG/1
4 TABLET, ORALLY DISINTEGRATING ORAL EVERY 6 HOURS PRN
Status: DISCONTINUED | OUTPATIENT
Start: 2025-04-29 | End: 2025-04-29 | Stop reason: HOSPADM

## 2025-04-29 RX ORDER — ONDANSETRON HYDROCHLORIDE 2 MG/ML
4 INJECTION, SOLUTION INTRAVENOUS EVERY 6 HOURS PRN
Status: DISCONTINUED | OUTPATIENT
Start: 2025-04-29 | End: 2025-04-29 | Stop reason: HOSPADM

## 2025-04-29 RX ORDER — IBUPROFEN 400 MG/1
800 TABLET ORAL EVERY 8 HOURS
Status: DISCONTINUED | OUTPATIENT
Start: 2025-04-29 | End: 2025-04-29

## 2025-04-29 RX ADMIN — IBUPROFEN 800 MG: 400 TABLET ORAL at 03:04

## 2025-04-29 RX ADMIN — CIPROFLOXACIN 500 MG: 500 TABLET ORAL at 08:04

## 2025-04-29 RX ADMIN — Medication: at 02:04

## 2025-04-29 RX ADMIN — OXYCODONE HYDROCHLORIDE 10 MG: 10 TABLET ORAL at 03:04

## 2025-04-29 RX ADMIN — ONDANSETRON 4 MG: 2 INJECTION INTRAMUSCULAR; INTRAVENOUS at 08:04

## 2025-04-29 RX ADMIN — IBUPROFEN 800 MG: 400 TABLET ORAL at 01:04

## 2025-04-29 NOTE — ANESTHESIA POSTPROCEDURE EVALUATION
Anesthesia Post Evaluation    Patient: Carmina Hernandez    Procedure(s) Performed: * No procedures listed *    Final Anesthesia Type: general      Patient location during evaluation: [facility]  Patient participation: Yes- Able to Participate  Level of consciousness: awake and alert and oriented  Post-procedure vital signs: reviewed and stable  Pain management: adequate  Airway patency: patent    PONV status at discharge: No PONV  Anesthetic complications: no      Cardiovascular status: blood pressure returned to baseline and hemodynamically stable  Respiratory status: unassisted, spontaneous ventilation and room air  Hydration status: euvolemic  Follow-up not needed.              Vitals Value Taken Time   /68 04/29/25 11:35   Temp 36.9 °C (98.4 °F) 04/29/25 11:35   Pulse 73 04/29/25 11:35   Resp 18 04/29/25 03:40   SpO2 100 % 04/29/25 11:35         Event Time   Out of Recovery 16:30:00         Pain/Sheryl Score: Pain Rating Prior to Med Admin: 2 (4/29/2025  1:09 PM)  Pain Rating Post Med Admin: 5 (4/29/2025  3:19 AM)  Sheryl Score: 9 (4/28/2025 10:15 AM)

## 2025-04-29 NOTE — PLAN OF CARE
[facility] Medicine Telemetry  Discharge Assessment    Primary Care Provider: Clay Lacy, DO     Discharge Assessment (most recent)       BRIEF DISCHARGE ASSESSMENT - 04/29/25 1402          Discharge Planning    Assessment Type Discharge Planning Assessment (P)      Resource/Environmental Concerns none (P)      Support Systems Spouse/significant other (P)      Equipment Currently Used at Home none (P)      Current Living Arrangements home (P)      Patient/Family Anticipates Transition to home with family (P)      Patient/Family Anticipated Services at Transition none (P)      DME Needed Upon Discharge  none (P)      Discharge Plan A Home with family (P)      Discharge Plan B Home (P)         Physical Activity    On average, how many days per week do you engage in moderate to strenuous exercise (like a brisk walk)? 6 days (P)      On average, how many minutes do you engage in exercise at this level? 90 min (P)         Financial Resource Strain    How hard is it for you to pay for the very basics like food, housing, medical care, and heating? Not hard at all (P)         Housing Stability    In the last 12 months, was there a time when you were not able to pay the mortgage or rent on time? No (P)      At any time in the past 12 months, were you homeless or living in a shelter (including now)? No (P)         Transportation Needs    In the past 12 months, has lack of transportation kept you from medical appointments or from getting medications? No (P)      In the past 12 months, has lack of transportation kept you from meetings, work, or from getting things needed for daily living? No (P)         Food Insecurity    Within the past 12 months, you worried that your food would run out before you got the money to buy more. Never true (P)      Within the past 12 months, the food you bought just didn't last and you didn't have money to get more. Never true (P)         Stress    Do you feel stress - tense,  restless, nervous, or anxious, or unable to sleep at night because your mind is troubled all the time - these days? Not at all (P)         Social Isolation    How often do you feel lonely or isolated from those around you?  Never (P)         Alcohol Use    Q1: How often do you have a drink containing alcohol? Never (P)      Q2: How many drinks containing alcohol do you have on a typical day when you are drinking? Patient does not drink (P)      Q3: How often do you have six or more drinks on one occasion? Never (P)         Utilities    In the past 12 months has the electric, gas, oil, or water company threatened to shut off services in your home? No (P)         Health Literacy    How often do you need to have someone help you when you read instructions, pamphlets, or other written material from your doctor or pharmacy? Never (P)                    CM completed initial discharge assessment with pt and spouse.  No hospital readmissions within th last 30 days.  Pt's family will provide transportation home.  Discharge planning booklet provided.       Pt lives with her  in a H.  Pt independent with mobility and ADLs.  No DME.  Pt has good family support from her .       Pt does not receive coumadin, dialysis, or HH services.     Discharge Plan A and Plan B have been determined by review of patient's clinical status, future medical and therapeutic needs, and coverage/benefits for post-acute care in coordination with multidisciplinary team members.    Aleksandar Bertrand RN-CM  Case Management  [facility]  [phone]

## 2025-04-29 NOTE — ASSESSMENT & PLAN NOTE
Anemia is likely due to chronic blood loss. Most recent hemoglobin and hematocrit are listed below.  Recent Labs     04/28/25  1116 04/29/25  0557   HGB 11.8* 12.0   HCT 36.3* 36.4*     Plan  - Monitor serial CBC: Daily  - Transfuse PRBC if patient becomes hemodynamically unstable, symptomatic or H/H drops below 7/21.  - Patient has not received any PRBC transfusions to date

## 2025-04-29 NOTE — NURSING
PCA Morphine syringe empty. Requested new syringe from pharmacy which took several minutes to be hand-delivered. Attempted to replace with new syringe and got an error message that the order had been discontinued. Patient reports 9/10 pain due to lapse in morphine administration. Pt also reports nausea.    Per Dr. Guzmán's note, plan is to dc PCA at 6 AM and transition to oral pain medication PRN. Notified LUZ Stapleton via secure chat. Requested zofran. No new PCA orders. See MAR.

## 2025-04-29 NOTE — PLAN OF CARE
Problem: Adult Inpatient Plan of Care  Goal: Plan of Care Review  Outcome: Adequate for Care Transition  Goal: Patient-Specific Goal (Individualized)  Outcome: Adequate for Care Transition  Goal: Absence of Hospital-Acquired Illness or Injury  Outcome: Adequate for Care Transition  Goal: Optimal Comfort and Wellbeing  Outcome: Adequate for Care Transition  Goal: Readiness for Transition of Care  Outcome: Adequate for Care Transition     Problem: Wound  Goal: Optimal Coping  Outcome: Adequate for Care Transition  Goal: Optimal Functional Ability  Outcome: Adequate for Care Transition  Goal: Absence of Infection Signs and Symptoms  Outcome: Adequate for Care Transition  Goal: Improved Oral Intake  Outcome: Adequate for Care Transition  Goal: Optimal Pain Control and Function  Outcome: Adequate for Care Transition  Goal: Skin Health and Integrity  Outcome: Adequate for Care Transition  Goal: Optimal Wound Healing  Outcome: Adequate for Care Transition     Problem: Fall Injury Risk  Goal: Absence of Fall and Fall-Related Injury  Outcome: Adequate for Care Transition

## 2025-04-29 NOTE — DISCHARGE SUMMARY
[facility] - Internal Medicine [facility] Medicine  Discharge Summary      Patient Name: Carmina Hernandez  MRN: [id number]  DEE: [id number]  Patient Class: OP- Observation  Admission Date: 4/28/2025  Hospital Length of Stay: 0 days  Discharge Date and Time: 4/29/2025  3:19 PM  Attending Physician: Rufina Barillas MD   Discharging Provider: Rufina aBrillas MD  Primary Care Provider: Clay Lacy DO  [facility] Medicine Team: [facility] MED  Rufina Barillas MD  Primary Care Team: [facility]    HPI:   Carmina Hernandez is a 39 y.o. female w/ PMHx of menorrhagia and uterine fibroid s/p uterine artery embolization by IR AM 4/28. Patient admitted for monitoring and pain control post-procedure. On my evaluation in the PACU, patient endorsing pain and nausea. On PCA for pain. Denies fever or chills. Denies chest pain or difficulty breathing. Denies constipation or diarrhea.       * No surgery found *      Hospital Course:   Patient's pain and nausea were controlled by HOD2. Discharged home on below IR recommendations.     Patient seen and examined on day of discharge and deemed appropriate for discharge. Plan discussed with patient, who was agreeable and amenable. Medications were discussed and reviewed, outpatient follow-up scheduled, ER precautions were given, all questions were answered to the patient's satisfaction, and patient was subsequently discharged.      Goals of Care Treatment Preferences:  Code Status: Full Code         Consults:     Assessment & Plan  Status post embolization of uterine artery  History of uterine fibroid  Per IR recommendations:   1. Overnight admission for pain management (PCA pump)  2. Ok to discharge in AM when pain is controlled, patient able to void and afebrile  3. Encourage fluid intake  4. Prescriptions to be sent to pharmacy        - Oxycodone 5 mg PRN      -  Ciprofloxacin 500 mg BID for 5 days      - Ibuprofen 800 mg TID PRN      - Zofran 4 mg Q6HPRN for nausea  4.  "Return to normal activity in 4-7 days    5. Avoid tampons and intercourse for 1 week  6. Expect possible occasional vaginal discharge for 1- 3 weeks  7. Plan for follow up in 3 weeks, 3 months and 6 months in IR clinic (virtually)     PCA: morphine pump, 0.5mg q6min, max 5mg per hour      Obesity  There is no height or weight on file to calculate BMI. Morbid obesity complicates all aspects of disease management from diagnostic modalities to treatment. Weight loss encouraged and health benefits explained to patient.   Anemia  Anemia is likely due to chronic blood loss. Most recent hemoglobin and hematocrit are listed below.  Recent Labs     04/28/25  1116 04/29/25  0547   HGB 11.8* 12.0   HCT 36.3* 36.4*     Plan  - Monitor serial CBC: Daily  - Transfuse PRBC if patient becomes hemodynamically unstable, symptomatic or H/H drops below 7/21.  - Patient has not received any PRBC transfusions to date  Bradycardia  Sinus cyrus on EKG    Final Active Diagnoses:    Diagnosis Date Noted POA    PRINCIPAL PROBLEM:  Status post embolization of uterine artery [Z98.890] 04/28/2025 Not Applicable    Anemia [D64.9] 04/28/2025 Yes    History of uterine fibroid [Z86.018] 04/28/2025 Not Applicable    Bradycardia [R00.1] 04/28/2025 Yes    Obesity [E66.9] 03/18/2024 Yes     Chronic      Problems Resolved During this Admission:       Discharged Condition: stable    Disposition:     Follow Up:    Patient Instructions:   No discharge procedures on file.    Significant Diagnostic Studies: Labs: BMP: No results for input(s): "GLU", "NA", "K", "CL", "CO2", "BUN", "CREATININE", "CALCIUM", "MG" in the last 48 hours. and CBC   Recent Labs   Lab 04/29/25  0557   WBC 10.21   HGB 12.0   HCT 36.4*          Pending Diagnostic Studies:       Procedure Component Value Units Date/Time    CBC auto differential [[id number]]  (Abnormal) Collected: 04/29/25 0557    Order Status: Sent Lab Status: In process Updated: 04/29/25 0645    Specimen: Blood  "    Narrative:      The following orders were created for panel order CBC auto differential.  Procedure                               Abnormality         Status                     ---------                               -----------         ------                     CBC with Differential[[id number]]       Abnormal            Preliminary result           Please view results for these tests on the individual orders.           Medications:  Reconciled Home Medications:      Medication List        START taking these medications      ondansetron 4 MG Tbdl  Commonly known as: ZOFRAN-ODT  Dissolve 1 tablet (4 mg total) by mouth every 6 (six) hours as needed.            CONTINUE taking these medications      ciprofloxacin HCl 500 MG tablet  Commonly known as: CIPRO  Take 1 tablet (500 mg total) by mouth every 12 (twelve) hours. for 5 days     docusate sodium 100 MG capsule  Commonly known as: COLACE  Take 1 capsule (100 mg total) by mouth 2 (two) times daily. for 7 days     hydroCHLOROthiazide 12.5 MG Tab  Take 1 tablet (12.5 mg total) by mouth daily as needed (leg swelling).     ibuprofen 800 MG tablet  Commonly known as: ADVIL,MOTRIN  Take 1 tablet (800 mg total) by mouth 3 (three) times daily for 5 days (Take with food)     norethindrone 5 mg Tab  Commonly known as: AYGESTIN  Take 1 tablet by mouth only on cycle days 5-26 each menstrual cycle. Cycle day 1 is the first day of your period.     oxyCODONE 5 MG immediate release tablet  Commonly known as: ROXICODONE  Take 1 tablet (5 mg total) by mouth every 6 (six) hours as needed for Pain.     PRENATAL 19 ORAL  Take by mouth.     tretinoin 0.05 % cream  Commonly known as: RETIN-A  Apply 1 application  topically every evening.            STOP taking these medications      ondansetron 4 MG tablet  Commonly known as: ZOFRAN              Indwelling Lines/Drains at time of discharge:   Lines/Drains/Airways       None                   Time spent on the discharge of patient: 35  minutes         Rufina Barillas MD  Department of Hospital Medicine  [facility] - Internal Medicine Telemetry

## 2025-04-29 NOTE — PROGRESS NOTES
Patient doing well this AM. PCA off. Pain well-controlled s/p ibuprofen and Zofran. Some nausea. Had episode of emesis with crackers, tolerating lots of liquids. Urinating well. Some brown discharge this AM. Groin site soft, non-tender.     - If patient able to tolerate PO this AM, ready for discharge  - Discussed expected post-procedural course with patient as well as return precautions (high-grade fevers, foul smelling discharge, etc)  - Discharge medications sent to pharmacy:      - Oxycodone 5 mg PRN      -  Ciprofloxacin 500 mg BID for 5 days      - Ibuprofen 800 mg TID PRN (patient advised to take with meals)      - Zofran 4 mg Q8HPRN for nausea      - Colace 150mg BID while taking oxycodone  - Return to normal activity in 4-7 days. Discussed waiting several weeks before restarting weight-lifting   - Avoid tampons and intercourse for 1 week  - Expect possible occasional vaginal discharge for 1- 3 weeks  - IR Schedulers will reach out for follow-up at 3 weeks, 3 months and 6 months in IR clinic (virtually)     Theresa Guzmán MD  Interventional Radiology

## 2025-04-29 NOTE — NURSING
At 1400 - Pt. Given AVS, IV discontinued, tele discontinued. Virtual nurse will give DC teaching, pt will opt to walk off unit and  meds from pharamcy on way out.     At 1450 - Pt. Left unit.

## 2025-04-30 ENCOUNTER — TELEPHONE (OUTPATIENT)
Dept: FAMILY MEDICINE | Facility: CLINIC | Age: 40
End: 2025-04-30
Payer: COMMERCIAL

## 2025-04-30 NOTE — PLAN OF CARE
[facility] - Internal Medicine Telemetry  Discharge Final Note    Primary Care Provider: Clay Lacy DO    Expected Discharge Date: 4/29/2025    Patient medically cleared for discharge. Patient family/friends to provide transportation. Patient cleared from CM standpoint.    Final Discharge Note (most recent)       Final Note - 04/30/25 0958          Final Note    Assessment Type Final Discharge Note (P)      Anticipated Discharge Disposition Home or Self Care (P)      Hospital Resources/Appts/Education Provided Provided patient/caregiver with written discharge plan information;Appointments scheduled and added to AVS (P)         Post-Acute Status    Post-Acute Authorization Other (P)      Other Status No Post-Acute Service Needs (P)      Discharge Delays None known at this time (P)                      Important Message from Medicare             Discharge Plan A and Plan B have been determined by review of patient's clinical status, future medical and therapeutic needs, and coverage/benefits for post-acute care in coordination with multidisciplinary team members.    Aleksandar Bertrand RN-CM  Case Management  [facility]  [phone]

## 2025-04-30 NOTE — TELEPHONE ENCOUNTER
----- Message from Ester sent at 4/29/2025  3:37 PM CDT -----  Type:  Test ResultsWho Called: ptName of Test (Lab/Mammo/Etc): labsDate of Test: 4/25 & 26Ordering Provider: BanksWhere the test was performed: [facility]Would the patient rather a call back or a response via [facility]? callBest Call Back Number: [phone] Information:  Pt is returning a call  Please call back to advise, thank you!

## 2025-05-02 ENCOUNTER — TELEPHONE (OUTPATIENT)
Dept: INTERVENTIONAL RADIOLOGY/VASCULAR | Facility: CLINIC | Age: 40
End: 2025-05-02
Payer: COMMERCIAL

## 2025-05-19 ENCOUNTER — OFFICE VISIT (OUTPATIENT)
Dept: INTERVENTIONAL RADIOLOGY/VASCULAR | Facility: CLINIC | Age: 40
End: 2025-05-19
Payer: COMMERCIAL

## 2025-05-19 VITALS — HEART RATE: 63 BPM | WEIGHT: 158.94 LBS | BODY MASS INDEX: 28.16 KG/M2 | HEIGHT: 63 IN

## 2025-05-19 DIAGNOSIS — D21.9 FIBROID: Primary | ICD-10-CM

## 2025-05-19 DIAGNOSIS — Z98.890 STATUS POST EMBOLIZATION OF UTERINE ARTERY: ICD-10-CM

## 2025-05-19 PROCEDURE — 3044F HG A1C LEVEL LT 7.0%: CPT | Mod: CPTII,S$GLB,,

## 2025-05-19 PROCEDURE — 99999 PR PBB SHADOW E&M-EST. PATIENT-LVL II: CPT | Mod: PBBFAC,,,

## 2025-05-19 PROCEDURE — 99212 OFFICE O/P EST SF 10 MIN: CPT | Mod: S$GLB,,,

## 2025-05-19 PROCEDURE — 3008F BODY MASS INDEX DOCD: CPT | Mod: CPTII,S$GLB,,

## 2025-05-19 NOTE — PROGRESS NOTES
Subjective     Patient ID: Carmina Hernandez is a 39 y.o. female.    Chief Complaint: Follow-up    Patient referred to Interventional Radiology by Kellen Phillip MD for evaluation of fibroids. Patient has complaints of heavy bleeding with her menses using 10 pads/tampons each day, lasting 5-6 days. She endorses severe cramping with her menses as well.  An ultrasound noted fibroids. A follow up MRI was obtained on 3/25/2025.    Patient is now s/p UFE procedure on 4/28 and here for her 1st follow up. She reports she tolerated procedure well. She endorses 4 days of pain and constipation likely due to opioid use. Pain today 0/10. First day of last menses was 5/10. She reports that it was not as heavy. Having to change maybe 3 times a day (improvement from 10). She continued with her usual abdominal cramps and leg cramps. She also reports her menses was a little longer than her usual 6 days and was 8 days. She admits to minimal brownish discharge. Very little. No smell or irritation. No immediate complaints today. Overall doing well.       Review of Systems   Constitutional:  Negative for fatigue and fever.   Respiratory:  Negative for shortness of breath.    Cardiovascular:  Negative for chest pain.   Gastrointestinal:  Negative for abdominal pain and constipation.   Genitourinary:  Positive for menstrual problem and vaginal discharge (minimal). Negative for menstrual irregularity.   Neurological:  Negative for facial asymmetry and speech difficulty.   Psychiatric/Behavioral:  Negative for behavioral problems and confusion.         Objective     Physical Exam  Constitutional:       General: She is not in acute distress.     Appearance: Normal appearance.   HENT:      Head: Normocephalic and atraumatic.      Nose: Nose normal.   Eyes:      Conjunctiva/sclera: Conjunctivae normal.   Pulmonary:      Effort: Pulmonary effort is normal.   Neurological:      General: No focal deficit present.      Mental Status: She is alert.    Psychiatric:         Mood and Affect: Mood normal.         Behavior: Behavior normal.          Assessment and Plan     1. Fibroid    2. Status post embolization of uterine artery      - Will continue to follow symptoms.   - Scheduled a 3 month follow up (8/2025).     Niecy Scanlon PA-C  Interventional Radiology  [phone]

## 2025-06-30 ENCOUNTER — OFFICE VISIT (OUTPATIENT)
Dept: OBSTETRICS AND GYNECOLOGY | Facility: CLINIC | Age: 40
End: 2025-06-30
Payer: COMMERCIAL

## 2025-06-30 VITALS
HEIGHT: 63 IN | DIASTOLIC BLOOD PRESSURE: 60 MMHG | SYSTOLIC BLOOD PRESSURE: 120 MMHG | BODY MASS INDEX: 28.85 KG/M2 | WEIGHT: 162.81 LBS

## 2025-06-30 DIAGNOSIS — N94.10 DYSPAREUNIA IN FEMALE: ICD-10-CM

## 2025-06-30 DIAGNOSIS — N95.1 VASOMOTOR SYMPTOMS DUE TO MENOPAUSE: ICD-10-CM

## 2025-06-30 DIAGNOSIS — Z01.419 WELL WOMAN EXAM: Primary | ICD-10-CM

## 2025-06-30 DIAGNOSIS — N92.0 MENORRHAGIA WITH REGULAR CYCLE: ICD-10-CM

## 2025-06-30 DIAGNOSIS — D21.9 LEIOMYOMA: ICD-10-CM

## 2025-06-30 PROCEDURE — 99395 PREV VISIT EST AGE 18-39: CPT | Mod: S$GLB,,, | Performed by: GENERAL PRACTICE

## 2025-06-30 PROCEDURE — 3044F HG A1C LEVEL LT 7.0%: CPT | Mod: CPTII,S$GLB,, | Performed by: GENERAL PRACTICE

## 2025-06-30 PROCEDURE — 3074F SYST BP LT 130 MM HG: CPT | Mod: CPTII,S$GLB,, | Performed by: GENERAL PRACTICE

## 2025-06-30 PROCEDURE — 99999 PR PBB SHADOW E&M-EST. PATIENT-LVL II: CPT | Mod: PBBFAC,,, | Performed by: GENERAL PRACTICE

## 2025-06-30 PROCEDURE — 3008F BODY MASS INDEX DOCD: CPT | Mod: CPTII,S$GLB,, | Performed by: GENERAL PRACTICE

## 2025-06-30 PROCEDURE — 3078F DIAST BP <80 MM HG: CPT | Mod: CPTII,S$GLB,, | Performed by: GENERAL PRACTICE

## 2025-06-30 RX ORDER — ESTRADIOL 0.5 MG/1
0.5 TABLET ORAL DAILY
Qty: 30 TABLET | Refills: 11 | Status: SHIPPED | OUTPATIENT
Start: 2025-06-30 | End: 2026-06-30

## 2025-06-30 NOTE — PROGRESS NOTES
Background   2024  Carmina Hernandez is a 38 y.o. here for f/u of ultrasound results.  Previously seen by Ms. Spencerton in our clinic who ordered the ultrasound b/c the patient's uterus felt enlarged on exam.  Has deep dyspareunia and dysmenorrhea in addition to HMB with iron deficiency / mild anemia.  -Rx Seasonique    2025  39 y.o. Tried 4 months of TANYA and had near-daily bleeding, was very heavy.  Then took single 21 day course of DANIEL 5mg to prevent bleeding on vacation.  That worked well for her.  She definitely is not interested in Mirena IUD, Depo, or hysterectomy.  VMS at least 2 per night.  -Referral to Int Radiology in consideration of UAE  -Rx: norethindrone for cyclic administration in the meantime - can continue if happy with this regimen  -f/u results of CT/NG testing, hormone levels, CBC, TSH, iron studies, d3 LH/FSH/E2, and total testosterone and SHBG    2025  Uterine artery embolization     ASSESSMENT AND PLAN   2025  39 y.o.  for WWE and f/u after recent UAE for symptomatic fibroid uterus.  So far doesn't notice improvement.  Bothered by VMS also.    To manage bleeding and VMS:  start estrace 0.5mg daily, DANIEL CD -  Will f/u with an e-visit after 2 menstrual cycles, ~01 AUG, to assess VMS and possibly adding Test cream to regimen  Cervical Cancer screening: next cervical CA screen (PAP+HPV) due MAR 2029  Return to clinic: for annual GYN check-up, sooner prn    Leslie L Weeks, MD [facility]    SUBJECTIVE   2025  Carmina Hernandez is a 39 y.o. here for WWE and follow-up.  Large fibroid uterus, failed trial of TANYA, recently had UAE on 25.  Doesn't notice any improvement yet regarding HMB and dysmenorrhea / dyspareunia.  Hasn't been taking DANIEL because she wanted to see if the UAE would improve her sxs.  Full medical history reviewed and updated today (below).     G'sP's:   Relationship: , sexually active  Contraception: Vasectomy  LMP:  Patient's last menstrual period was 06/04/2025.  PAP Hx: no h/o abnormals  LAST PAP: 3/20/2024: PAP neg / HPV neg      OBJECTIVE   PHYSICAL EXAM  Vitals:    06/30/25 0929   BP: 120/60     GEN = alert/oriented, nad, pleasant  HEENT = sclera anicteric, EOM grossly normal  BREASTS = deferred, no concerns  CV = BP and HR as per vitals  PULM = normal respiratory effort   = deferred, no concerns    LABS AND RADS    Lab Results   Component Value Date    WBC 10.21 04/29/2025    HGB 12.0 04/29/2025    HCT 36.4 (L) 04/29/2025     04/29/2025    MCV 93 04/29/2025      Lab Results   Component Value Date    LABCHLA Not Detected 03/11/2025    LABNGO Not Detected 03/11/2025     Lab Results   Component Value Date    TSH 0.741 03/14/2025    LABLH 4.3 03/14/2025    FSH 7.12 03/14/2025    ESTRADIOL 38 03/14/2025    PROLACTIN 14.0 03/14/2025    TOTALTESTOST 19 03/14/2025    SEXHORMONEBI 68 03/14/2025    17HYDROXYPRO <31 (L) 03/14/2025   GISELE = 0.97%    PELVIC US (19 MAR 2024) =  Uterus 12  cm  EMS 15 mm  ROV 7w0v7km  LOV 3x2x3 cm  Other: multiple fibroids, largest are 3 and 8cm <-- in 2020 largest was 3cm    MRI PELVIS (3/25/25)  There are 3 uterine leiomyomas the largest of which is fundal measuring 8.4 cm. The 2 other fibroids measure 1.5 cm, and 2.5 cm. Cervix and vagina are normal. Endometrium measures 1.9 cm. There are physiologic ovarian cysts and follicles. No adenopathy is seen. No adnexal masses are seen. Bladder urethra vagina and rectum are normal. There are no pedunculated fibroids.       HISTORY   Date taken or verified: 06/30/2025     GYNECOLOGIC HISTORY  PAP Hx: no h/o abnormals  Genital HSV: No  Other STD Hx: denies    Menarche: 12yoa  Bleeding -- Period duration: 5-6 days / # Heavy Days: 4 / Pad/tampon ? on heavy days: 10 a day --> for past 2-3yrs / Intermenstrual bleeding: No / Period frequency:regular every 28-30 days  Pain -- Dysmenorrhea: Yes - only within the last couple of years / Non-menstrual pelvic  pressure/pain: No / Dyspareunia: Yes: every time, deep, tried physical therapy in 2020  Other -- Vasomotor Sxs: yes, typically 2 per night / Vaginal dryness: denies    OBSTETRIC HISTORY  Living children: 1 (11yo daughter)  Vaginal deliveries: 1    SOCIAL HISTORY  Lives with:  and daughter  Smoker: non-smoker / Alcohol: denies / Drugs: denies  Domestic Violence: No  Occupation: homemaker    FAMILY HISTORY  BREAST CA: maternal aunt / UTERINE CA: none / OVARIAN CA: none  COLON CA: none     PAST MEDICAL HISTORY  No past medical history on file.    PAST SURGICAL HISTORY  ----------------------------    Keloid excision    chest    Uterine artery embolization    4/28/2025     ALLERGIES  Review of patient's allergies indicates:   Allergen Reactions    Sulfa (sulfonamide antibiotics)      MEDICATIONS  Current Outpatient Medications   Medication Instructions    estradioL (ESTRACE) 0.5 mg, Oral, Daily    hydroCHLOROthiazide 12.5 mg, Oral, Daily PRN    norethindrone (AYGESTIN) 5 mg Tab Take 1 tablet by mouth only on cycle days 5-26 each menstrual cycle. Cycle day 1 is the first day of your period.    prenatal no115/iron/folic acid (PRENATAL 19 ORAL) Take by mouth.    tretinoin (RETIN-A) 0.05 % cream 1 application , Nightly

## 2025-06-30 NOTE — PROGRESS NOTES
F/u  40yo with known fibroid uterus. Please assess for stability in size / number of fibroids, uterine artery embolization.; Excessive and frequent menstruation with regular cycle.

## 2025-08-19 ENCOUNTER — OFFICE VISIT (OUTPATIENT)
Dept: INTERVENTIONAL RADIOLOGY/VASCULAR | Facility: CLINIC | Age: 40
End: 2025-08-19
Payer: COMMERCIAL

## 2025-08-19 DIAGNOSIS — D21.9 FIBROID: Primary | ICD-10-CM

## 2025-08-19 PROCEDURE — 1160F RVW MEDS BY RX/DR IN RCRD: CPT | Mod: CPTII,95,, | Performed by: FAMILY MEDICINE

## 2025-08-19 PROCEDURE — 1159F MED LIST DOCD IN RCRD: CPT | Mod: CPTII,95,, | Performed by: FAMILY MEDICINE

## 2025-08-19 PROCEDURE — 3044F HG A1C LEVEL LT 7.0%: CPT | Mod: CPTII,95,, | Performed by: FAMILY MEDICINE

## 2025-08-19 PROCEDURE — 98004 SYNCH AUDIO-VIDEO EST SF 10: CPT | Mod: 95,,, | Performed by: FAMILY MEDICINE

## 2025-08-21 ENCOUNTER — TELEPHONE (OUTPATIENT)
Dept: INTERVENTIONAL RADIOLOGY/VASCULAR | Facility: CLINIC | Age: 40
End: 2025-08-21
Payer: COMMERCIAL

## 2025-08-25 ENCOUNTER — PATIENT MESSAGE (OUTPATIENT)
Dept: OBSTETRICS AND GYNECOLOGY | Facility: CLINIC | Age: 40
End: 2025-08-25
Payer: COMMERCIAL

## 2025-08-25 ENCOUNTER — PATIENT MESSAGE (OUTPATIENT)
Dept: INTERVENTIONAL RADIOLOGY/VASCULAR | Facility: CLINIC | Age: 40
End: 2025-08-25
Payer: COMMERCIAL